# Patient Record
Sex: FEMALE | Race: OTHER | Employment: UNEMPLOYED | URBAN - METROPOLITAN AREA
[De-identification: names, ages, dates, MRNs, and addresses within clinical notes are randomized per-mention and may not be internally consistent; named-entity substitution may affect disease eponyms.]

---

## 2017-02-21 ENCOUNTER — OFFICE VISIT (OUTPATIENT)
Dept: INTERNAL MEDICINE CLINIC | Facility: CLINIC | Age: 27
End: 2017-02-21

## 2017-02-21 VITALS
BODY MASS INDEX: 26 KG/M2 | HEART RATE: 84 BPM | RESPIRATION RATE: 20 BRPM | SYSTOLIC BLOOD PRESSURE: 119 MMHG | WEIGHT: 145 LBS | TEMPERATURE: 98 F | DIASTOLIC BLOOD PRESSURE: 75 MMHG

## 2017-02-21 DIAGNOSIS — J06.9 VIRAL UPPER RESPIRATORY TRACT INFECTION: Primary | ICD-10-CM

## 2017-02-21 PROCEDURE — 99214 OFFICE O/P EST MOD 30 MIN: CPT | Performed by: INTERNAL MEDICINE

## 2017-02-21 PROCEDURE — 99212 OFFICE O/P EST SF 10 MIN: CPT | Performed by: INTERNAL MEDICINE

## 2017-02-21 RX ORDER — BENZONATATE 200 MG/1
200 CAPSULE ORAL 3 TIMES DAILY PRN
Qty: 12 CAPSULE | Refills: 0 | Status: SHIPPED | OUTPATIENT
Start: 2017-02-21 | End: 2017-03-23 | Stop reason: ALTCHOICE

## 2017-02-21 NOTE — PROGRESS NOTES
HPI:    Patient ID: Shari iHll is a 32year old female. Cough  This is a new problem. The cough is non-productive. Associated symptoms include myalgias and a sore throat.  Pertinent negatives include no chest pain, chills, fever or shortness of has no wheezes. She has no rales. Musculoskeletal: Normal range of motion. Neurological: She is alert and oriented to person, place, and time. Skin: She is not diaphoretic.    Psychiatric: Her behavior is normal.        02/21/17  1503   BP: 119/75   P

## 2017-03-23 ENCOUNTER — OFFICE VISIT (OUTPATIENT)
Dept: INTERNAL MEDICINE CLINIC | Facility: CLINIC | Age: 27
End: 2017-03-23

## 2017-03-23 VITALS
BODY MASS INDEX: 25.62 KG/M2 | HEART RATE: 92 BPM | DIASTOLIC BLOOD PRESSURE: 65 MMHG | SYSTOLIC BLOOD PRESSURE: 106 MMHG | TEMPERATURE: 98 F | HEIGHT: 63 IN | WEIGHT: 144.63 LBS

## 2017-03-23 DIAGNOSIS — H92.03 EAR PAIN, BILATERAL: Primary | ICD-10-CM

## 2017-03-23 PROCEDURE — 99212 OFFICE O/P EST SF 10 MIN: CPT | Performed by: INTERNAL MEDICINE

## 2017-03-23 PROCEDURE — 99213 OFFICE O/P EST LOW 20 MIN: CPT | Performed by: INTERNAL MEDICINE

## 2017-03-23 NOTE — PROGRESS NOTES
Fady Dalton is a 32year old female. Patient presents with:  Ear Pain    HPI:   Ms. Cathy Morrison presents this morning, accompanied by her  who helps with interpretation, for evaluation.     For the past 6 weeks, she has had persistent ear p plan.    Ignacio Gómez MD  3/23/2017  9:49 AM

## 2017-05-08 ENCOUNTER — TELEPHONE (OUTPATIENT)
Dept: OBGYN CLINIC | Facility: CLINIC | Age: 27
End: 2017-05-08

## 2017-05-08 NOTE — TELEPHONE ENCOUNTER
Palestinian speaking. C/O vaginal itching, vaginal burning, and cottage cheese like d/c for 2 weeks. States scratched the area a couple of days ago and noticed some spotting.   Thinks spotting may be related to being due for her period anytime as \"this is how

## 2017-05-08 NOTE — TELEPHONE ENCOUNTER
Per the pt she has a vaginal itch, and would like to speak with a nurse. Please advise. THE PT IS Serbian SPEAKING.

## 2017-05-12 NOTE — TELEPHONE ENCOUNTER
Unable to leave msg at # pt originally called from \"voice mail full\". Pt advised of below per CAP and states understanding. Pt offered appt for this morning declined d/t transportation. Pt accepted appt with CAP on 5/15/17.

## 2017-05-15 ENCOUNTER — OFFICE VISIT (OUTPATIENT)
Dept: OBGYN CLINIC | Facility: CLINIC | Age: 27
End: 2017-05-15

## 2017-05-15 VITALS
DIASTOLIC BLOOD PRESSURE: 72 MMHG | SYSTOLIC BLOOD PRESSURE: 112 MMHG | HEART RATE: 76 BPM | WEIGHT: 144.19 LBS | BODY MASS INDEX: 26 KG/M2

## 2017-05-15 DIAGNOSIS — A64 STD (SEXUALLY TRANSMITTED DISEASE): ICD-10-CM

## 2017-05-15 DIAGNOSIS — N89.8 DISCHARGE FROM THE VAGINA: Primary | ICD-10-CM

## 2017-05-15 PROCEDURE — 99213 OFFICE O/P EST LOW 20 MIN: CPT | Performed by: OBSTETRICS & GYNECOLOGY

## 2017-05-16 NOTE — PROGRESS NOTES
George Patterson 1990       Patient presents with:  Gyn Problem: itching, burning  since may 8th - burning and itching on the labia on the left. Pt also has some white mod consistency discharge.   Pt menses came on the  so she could not c

## 2017-05-23 ENCOUNTER — TELEPHONE (OUTPATIENT)
Dept: OBGYN CLINIC | Facility: CLINIC | Age: 27
End: 2017-05-23

## 2017-05-23 NOTE — TELEPHONE ENCOUNTER
----- Message from Ron Marr MD sent at 5/19/2017  9:24 AM CDT -----  Vaginal culture and Gc/chlamydia culture is negative

## 2017-05-26 NOTE — TELEPHONE ENCOUNTER
Spoke to  who states pt is not there right now. No LEO signed so was unable to given results to . Asked to have the pt call when she is able.  verbalized understanding.

## 2017-06-01 NOTE — TELEPHONE ENCOUNTER
Lmtcb at home. Called pt at cell # listed and informed of results per CAP below and states understanding.

## 2017-07-18 ENCOUNTER — TELEPHONE (OUTPATIENT)
Dept: OBGYN CLINIC | Facility: CLINIC | Age: 27
End: 2017-07-18

## 2017-07-18 NOTE — TELEPHONE ENCOUNTER
Spoke to patient's  LEO on file, informed patient's is due for a repeat pap smear 8/2017. Patient is to call Dr. Olman Correa office to set up appt. Patient  agrees with plan.

## 2019-04-01 ENCOUNTER — TELEPHONE (OUTPATIENT)
Dept: OBGYN CLINIC | Facility: CLINIC | Age: 29
End: 2019-04-01

## 2019-04-01 NOTE — TELEPHONE ENCOUNTER
Pt's  (LEO signed) reports lmp 12/12/18 and calling to start PN care. States pt was in [facility] [place] caring for her ill mother and did not know she was pregnant.  Pt is taking PNV prescribed in [facility] [place]  accepted OBN appt tomorrow morning and advised

## 2019-04-01 NOTE — TELEPHONE ENCOUNTER
Missed menses LMP 12/22/19 HPT (Pt in [facility] did get PN pills from Doc)   Carlo speaks [place]

## 2019-04-01 NOTE — TELEPHONE ENCOUNTER
Sent to Peds in error- sent to [facility] RN I have reviewed and confirmed nurses' notes for patient's medications, allergies, medical history, and surgical history.

## 2019-04-02 ENCOUNTER — NURSE ONLY (OUTPATIENT)
Dept: OBGYN CLINIC | Facility: CLINIC | Age: 29
End: 2019-04-02
Payer: MEDICAID

## 2019-04-02 ENCOUNTER — TELEPHONE (OUTPATIENT)
Dept: OBGYN CLINIC | Facility: CLINIC | Age: 29
End: 2019-04-02

## 2019-04-02 VITALS — BODY MASS INDEX: 26.75 KG/M2 | WEIGHT: 151 LBS | HEIGHT: 63 IN

## 2019-04-02 DIAGNOSIS — Z3A.16 16 WEEKS GESTATION OF PREGNANCY: Primary | ICD-10-CM

## 2019-04-02 DIAGNOSIS — Z34.82 ENCOUNTER FOR SUPERVISION OF OTHER NORMAL PREGNANCY IN SECOND TRIMESTER: Primary | ICD-10-CM

## 2019-04-02 PROCEDURE — 81025 URINE PREGNANCY TEST: CPT | Performed by: OBSTETRICS & GYNECOLOGY

## 2019-04-02 RX ORDER — PRENATAL VIT/IRON FUM/FOLIC AC 27MG-0.8MG
1 TABLET ORAL DAILY
COMMUNITY

## 2019-04-02 NOTE — TELEPHONE ENCOUNTER
Sebt to referral nurse. When enter for ob u/s, it stated prior auth for this test was needed. Let pt know when it is approved.

## 2019-04-02 NOTE — PROGRESS NOTES
Pt seen for OBN appt today with no complaints. Normal PN labs ordered, 1 hr gtt and varicella. Pt advised all labs must be completed and resulted prior to MD appt.   Pt walked to  to schedule NPN appt with MD.    Pt had a positive upt in the off Syndrome No    Hemophilia No    Kalida's Chorea No    Mental Retardation/Autism No    Muscular Dystrophy No    Neural tube defects No    Sickle Cell Disease or trait No    Hammad-Sachs Disease No    Thalassemia No    Other inherited genetic or chromosomal

## 2019-04-02 NOTE — TELEPHONE ENCOUNTER
Informed  to inform pt that pt needs to get blood type and an u/s. Informed  to have pt call after she has her blood type.  informed that the ob u/s needs prior auth for it.    given phone number to schedule ob u/s, once refe

## 2019-04-02 NOTE — TELEPHONE ENCOUNTER
Pt had her OBN appt today. She is 15w6d. Pt was in [facility] from Jan 13 thru Feb. 20.  Pt states while in [facility] she had spotting on [place] 15 and again Feb 13th. Denies having IC or a BM.        states that pt went to ob in [facility] and ob did an bedsid

## 2019-04-03 ENCOUNTER — LAB ENCOUNTER (OUTPATIENT)
Dept: LAB | Facility: HOSPITAL | Age: 29
End: 2019-04-03
Attending: OBSTETRICS & GYNECOLOGY
Payer: MEDICAID

## 2019-04-03 DIAGNOSIS — Z34.82 ENCOUNTER FOR SUPERVISION OF OTHER NORMAL PREGNANCY IN SECOND TRIMESTER: ICD-10-CM

## 2019-04-03 PROCEDURE — 82950 GLUCOSE TEST: CPT

## 2019-04-03 PROCEDURE — 36415 COLL VENOUS BLD VENIPUNCTURE: CPT

## 2019-04-03 PROCEDURE — 86787 VARICELLA-ZOSTER ANTIBODY: CPT

## 2019-04-03 PROCEDURE — 86901 BLOOD TYPING SEROLOGIC RH(D): CPT

## 2019-04-03 PROCEDURE — 87389 HIV-1 AG W/HIV-1&-2 AB AG IA: CPT

## 2019-04-03 PROCEDURE — 87340 HEPATITIS B SURFACE AG IA: CPT

## 2019-04-03 PROCEDURE — 85025 COMPLETE CBC W/AUTO DIFF WBC: CPT

## 2019-04-03 PROCEDURE — 86762 RUBELLA ANTIBODY: CPT

## 2019-04-03 PROCEDURE — 86850 RBC ANTIBODY SCREEN: CPT

## 2019-04-03 PROCEDURE — 87086 URINE CULTURE/COLONY COUNT: CPT

## 2019-04-03 PROCEDURE — 86780 TREPONEMA PALLIDUM: CPT

## 2019-04-03 PROCEDURE — 86900 BLOOD TYPING SEROLOGIC ABO: CPT

## 2019-04-05 ENCOUNTER — TELEPHONE (OUTPATIENT)
Dept: OBGYN CLINIC | Facility: CLINIC | Age: 29
End: 2019-04-05

## 2019-04-05 ENCOUNTER — LABORATORY ENCOUNTER (OUTPATIENT)
Dept: LAB | Facility: HOSPITAL | Age: 29
End: 2019-04-05
Attending: OBSTETRICS & GYNECOLOGY
Payer: MEDICAID

## 2019-04-05 ENCOUNTER — HOSPITAL ENCOUNTER (OUTPATIENT)
Dept: ULTRASOUND IMAGING | Facility: HOSPITAL | Age: 29
Discharge: HOME | End: 2019-04-05
Attending: OBSTETRICS & GYNECOLOGY
Payer: MEDICAID

## 2019-04-05 DIAGNOSIS — Z3A.16 16 WEEKS GESTATION OF PREGNANCY: ICD-10-CM

## 2019-04-05 DIAGNOSIS — Z34.82 ENCOUNTER FOR SUPERVISION OF OTHER NORMAL PREGNANCY IN SECOND TRIMESTER: ICD-10-CM

## 2019-04-05 DIAGNOSIS — O24.319 MODIFIED WHITE CLASS B PREGESTATIONAL DIABETES MELLITUS: Primary | ICD-10-CM

## 2019-04-05 DIAGNOSIS — O24.319 MODIFIED WHITE CLASS B PREGESTATIONAL DIABETES MELLITUS: ICD-10-CM

## 2019-04-05 DIAGNOSIS — R73.09 ELEVATED GLUCOSE TOLERANCE TEST: ICD-10-CM

## 2019-04-05 PROCEDURE — 82952 GTT-ADDED SAMPLES: CPT

## 2019-04-05 PROCEDURE — 36415 COLL VENOUS BLD VENIPUNCTURE: CPT

## 2019-04-05 PROCEDURE — 76816 OB US FOLLOW-UP PER FETUS: CPT | Performed by: OBSTETRICS & GYNECOLOGY

## 2019-04-05 PROCEDURE — 83036 HEMOGLOBIN GLYCOSYLATED A1C: CPT

## 2019-04-05 PROCEDURE — 82951 GLUCOSE TOLERANCE TEST (GTT): CPT

## 2019-04-05 NOTE — TELEPHONE ENCOUNTER
Notified pt of results and recs below. Provided pt with # to DM ed and to Opthalmology to set up appts. Pt states she will do the blood work today and  the container for the 24 hr urine.

## 2019-04-05 NOTE — TELEPHONE ENCOUNTER
Please call patient and notify that she failed her 3h GTT. She is considered a Class B Diabetic, which means this isn't pregnancy related. She will need to see diabetic education.        Also will need baseline labs and to see Optho:     Baseline 24hr

## 2019-04-05 NOTE — TELEPHONE ENCOUNTER
Patient needs to see [facility] MFM for Class B Diabetes.   Needs level 2, fetal echo, growth, and NSTs

## 2019-04-08 ENCOUNTER — TELEPHONE (OUTPATIENT)
Dept: OBGYN CLINIC | Facility: CLINIC | Age: 29
End: 2019-04-08

## 2019-04-08 ENCOUNTER — INITIAL PRENATAL (OUTPATIENT)
Dept: OBGYN CLINIC | Facility: CLINIC | Age: 29
End: 2019-04-08
Payer: MEDICAID

## 2019-04-08 VITALS
BODY MASS INDEX: 27 KG/M2 | WEIGHT: 151 LBS | SYSTOLIC BLOOD PRESSURE: 104 MMHG | HEART RATE: 71 BPM | DIASTOLIC BLOOD PRESSURE: 69 MMHG

## 2019-04-08 DIAGNOSIS — Z34.91 ENCOUNTER FOR SUPERVISION OF NORMAL PREGNANCY IN FIRST TRIMESTER, UNSPECIFIED GRAVIDITY: Primary | ICD-10-CM

## 2019-04-08 DIAGNOSIS — O24.319 PREGESTATIONAL DIABETES MELLITUS, MODIFIED WHITE CLASS B: ICD-10-CM

## 2019-04-08 PROCEDURE — 81002 URINALYSIS NONAUTO W/O SCOPE: CPT | Performed by: OBSTETRICS & GYNECOLOGY

## 2019-04-08 PROCEDURE — 0500F INITIAL PRENATAL CARE VISIT: CPT | Performed by: OBSTETRICS & GYNECOLOGY

## 2019-04-08 NOTE — TELEPHONE ENCOUNTER
PT IS NOT ON INSULIN YET BECAUSE SHE IS NEWLY DIAGNOSED. SHE FAILED HER 3 HOUR GLUCOSE TOLERANCE TEST. SHE HAS TO SEE THE DIABETIC EDUCATOR AND START CHECKING HER BLOOD SUGARS 4/DAY. SHE WILL INITIALLY START WITH DIET AND WHEN HER DIET IS NOT ENOUGH WE WILL BEGIN INSULIN.

## 2019-04-08 NOTE — TELEPHONE ENCOUNTER
There is nothing stating that pt is currently taking medication to manage diabetes so all cases were sent for review. Will patient be prescribed insulin? Please confirm so that I can try to get approved tomorrow.      [facility]#[id number]  Case# [id number]  Case# [id number]

## 2019-04-09 ENCOUNTER — HOSPITAL ENCOUNTER (OUTPATIENT)
Dept: ENDOCRINOLOGY | Facility: HOSPITAL | Age: 29
Discharge: HOME | End: 2019-04-09
Attending: OBSTETRICS & GYNECOLOGY
Payer: MEDICAID

## 2019-04-09 DIAGNOSIS — O24.319 PREGESTATIONAL DIABETES MELLITUS, MODIFIED WHITE CLASS B: Primary | ICD-10-CM

## 2019-04-09 NOTE — TELEPHONE ENCOUNTER
Patient needs MFM consult, level 2, fetal echo and serial MFM growth for Class B pregestational diabetes. Her  Tyshawn Sanchez does all translation for her.

## 2019-04-09 NOTE — PROGRESS NOTES
Po Zarate  : 1990 was seen for Gestational Diabetes Counseling: Group    Date: 2019   Start time: 0900  End time: 56    Pt is Yi speaking; used  # [id number]    Pt is at 16 wks gestation; noted after class that per referr Follow recommended GDM meal plan. 2. Begin testing fasting glucose and 2 hours after meals. Call MD or [facility] with any readings above targets. 3. Bring glucose log to each MD office visit. 4. Encouraged activity if no restrictions.    5. Encouraged Jannet Wolfe

## 2019-04-09 NOTE — TELEPHONE ENCOUNTER
Pt was on insulin with last pregnancy. She is newly dx for this pregnancy. The aforementioned testing is standard of care for Class B DM.

## 2019-04-09 NOTE — PROGRESS NOTES
Carlo at visit. They decline genetics. Discussed MFM consultation and serial US. She is seeing educator tomorrow and they will fax sugars within 3-4 days. She did use insulin last pregnancy.

## 2019-04-09 NOTE — TELEPHONE ENCOUNTER
[facility]- Pt does have an authorization for cpt 65433 in the event that the level 2 is not approved. I will attempt to get the authorizations at 20 weeks.

## 2019-04-10 ENCOUNTER — LAB ENCOUNTER (OUTPATIENT)
Dept: LAB | Facility: HOSPITAL | Age: 29
End: 2019-04-10
Attending: OBSTETRICS & GYNECOLOGY
Payer: MEDICAID

## 2019-04-10 DIAGNOSIS — O24.319 MODIFIED WHITE CLASS B PREGESTATIONAL DIABETES MELLITUS: ICD-10-CM

## 2019-04-10 PROCEDURE — 82570 ASSAY OF URINE CREATININE: CPT

## 2019-04-10 PROCEDURE — 84156 ASSAY OF PROTEIN URINE: CPT

## 2019-04-16 ENCOUNTER — OFFICE VISIT (OUTPATIENT)
Dept: OPTOMETRY | Facility: CLINIC | Age: 29
End: 2019-04-16
Payer: MEDICAID

## 2019-04-16 DIAGNOSIS — O24.319 PREGESTATIONAL DIABETES MELLITUS, MODIFIED WHITE CLASS B: Primary | ICD-10-CM

## 2019-04-16 PROCEDURE — 92004 COMPRE OPH EXAM NEW PT 1/>: CPT | Performed by: OPTOMETRIST

## 2019-04-16 NOTE — PATIENT INSTRUCTIONS
Pregestational diabetes mellitus, modified White class B  I advised patient that there is no background diabetic retinopathy in either eye and that they should continue to keep their blood sugar under control and continue to see their physician as directed

## 2019-04-16 NOTE — PROGRESS NOTES
Marcela Stokes is a 29year old female. HPI:     HPI     Diabetic Eye Exam     Diabetes characteristics include controlled with diet. Duration of 3 months. Number of years diabetic: 3 months. Number of years on pills 0.   Number of years on insul Eyes, Respiratory, Psychiatric, Allergic/Imm, Heme/Lymph    Last edited by Milana Garcia, OD on 4/16/2019  2:41 PM. (History)          PHYSICAL EXAM:     Base Eye Exam     Visual Acuity (Snellen - Linear)       Right Left Both    Dist sc 20/25 20/25 20/20 immediately if they notice any changes or problems with their vision       No orders of the defined types were placed in this encounter.       Meds This Visit:  Requested Prescriptions      No prescriptions requested or ordered in this encounter        Foll

## 2019-04-16 NOTE — ASSESSMENT & PLAN NOTE
I advised patient that there is no background diabetic retinopathy in either eye and that they should continue to keep their blood sugar under control and continue to see their physician as directed.  I stressed the importance of yearly diabetic eye exams i

## 2019-04-17 ENCOUNTER — TELEPHONE (OUTPATIENT)
Dept: OBGYN CLINIC | Facility: CLINIC | Age: 29
End: 2019-04-17

## 2019-04-17 NOTE — TELEPHONE ENCOUNTER
Notes recorded by Ivelisse Hansen DO on 4/17/2019 at 8:43 AM CDT  Pap is LGSIL and she'll need Colpo. She also had Colpo with Dr Thony Torres during 2015 pregnancy. Her pap normalized in 2016 and now mild again. Tried calling pt and pt is Kazakh speaking.  Pt

## 2019-04-23 ENCOUNTER — TELEPHONE (OUTPATIENT)
Dept: OBGYN CLINIC | Facility: CLINIC | Age: 29
End: 2019-04-23

## 2019-04-23 ENCOUNTER — ROUTINE PRENATAL (OUTPATIENT)
Dept: OBGYN CLINIC | Facility: CLINIC | Age: 29
End: 2019-04-23
Payer: MEDICAID

## 2019-04-23 VITALS
DIASTOLIC BLOOD PRESSURE: 67 MMHG | BODY MASS INDEX: 27 KG/M2 | HEART RATE: 92 BPM | SYSTOLIC BLOOD PRESSURE: 101 MMHG | WEIGHT: 151 LBS

## 2019-04-23 DIAGNOSIS — O24.319 PREGESTATIONAL DIABETES MELLITUS, MODIFIED WHITE CLASS B: Primary | ICD-10-CM

## 2019-04-23 DIAGNOSIS — O12.10 PROTEINURIA AFFECTING PREGNANCY, ANTEPARTUM: Primary | ICD-10-CM

## 2019-04-23 DIAGNOSIS — Z34.92 ENCOUNTER FOR SUPERVISION OF NORMAL PREGNANCY IN SECOND TRIMESTER, UNSPECIFIED GRAVIDITY: Primary | ICD-10-CM

## 2019-04-23 PROCEDURE — 0502F SUBSEQUENT PRENATAL CARE: CPT | Performed by: OBSTETRICS & GYNECOLOGY

## 2019-04-23 PROCEDURE — 81002 URINALYSIS NONAUTO W/O SCOPE: CPT | Performed by: OBSTETRICS & GYNECOLOGY

## 2019-04-23 NOTE — TELEPHONE ENCOUNTER
Called pt to get info on which glucometer she has. Pt reports she has her father in law glucometer. Advised pt she should not share the machine. Informed pt nurse will call in a rx for a glucometer and supplies.  Pt's pharmacy updated and rx called in for g

## 2019-04-23 NOTE — TELEPHONE ENCOUNTER
Called pt and  answered (LEO signed) states pt is not available at this time. Notified  of results and recs.  states he knows Dr Dalia Garcia because his father sees this doctor.  has phone # and will schedule appt.

## 2019-04-23 NOTE — TELEPHONE ENCOUNTER
Per pt, insurance denied MFM scans. Can we investigate this? She is a class B diabetic and needs to see [facility] for level 2, serial growth scans, and NSTs. Currently 18w6d. Thanks!

## 2019-04-23 NOTE — TELEPHONE ENCOUNTER
Patient's order for level 2 US was entered. Patient was informed and given scheduling info.    Cpt [id number] approved 4/23/19-1/18/20 [id number]

## 2019-04-23 NOTE — PROGRESS NOTES
No maternal issues reported. BS not checked this week due to not having lancets. Patient didn't call our office to notify of us. Did see diabetic ed and has glucometer from previous pregnancy.   Will have RNs investigate and coordinate getting supplies t

## 2019-04-23 NOTE — TELEPHONE ENCOUNTER
----- Message from Kimberley Valentin DO sent at 4/23/2019 10:48 AM CDT -----  Needs to see nephrologist (Dr. Agnieszka Andrea or Kathy Garzon). Elevated protein and creatinine in urine.

## 2019-04-23 NOTE — TELEPHONE ENCOUNTER
Pt class B diabetic. Didn't receive lancets/glucometer from diabetic education. She was diabetic with previous pregnancy so has a machine. Please call to find out what they need so she can start checking her sugars.

## 2019-04-24 ENCOUNTER — TELEPHONE (OUTPATIENT)
Dept: OBGYN CLINIC | Facility: CLINIC | Age: 29
End: 2019-04-24

## 2019-04-24 NOTE — TELEPHONE ENCOUNTER
LEFT MESSAGE FOR CHINO THAT PT IS TO CALL [facility] TO MAKE HER APPT AND I WILL NOTIFY PT OF THIS. I CALLED AND SPOKE WITH PT'S  AND NOTIFIED HIM THAT LEVEL II WAS APPROVED AND TO CALL MFM, NUMBER [id number].

## 2019-04-26 ENCOUNTER — HOSPITAL ENCOUNTER (OUTPATIENT)
Dept: ENDOCRINOLOGY | Facility: HOSPITAL | Age: 29
Discharge: HOME | End: 2019-04-26
Attending: OBSTETRICS & GYNECOLOGY
Payer: MEDICAID

## 2019-04-26 VITALS — WEIGHT: 151.19 LBS | BODY MASS INDEX: 27 KG/M2

## 2019-04-26 DIAGNOSIS — O24.319 PREGESTATIONAL DIABETES MELLITUS, MODIFIED WHITE CLASS B: Primary | ICD-10-CM

## 2019-04-26 NOTE — PROGRESS NOTES
Amisha Yeung  : 1990 was seen for GDM Follow-Up Counseling: Individual    Alessandro Reid declined language line. Family member assisted with translation during visit.     Date: 2019  Referring Provider: Dr. Skye Carney  Start time: 8:00 am End time activity on BG values. Reviewed types of activity, duration, precautions. Monitoring:  Instructed to report readings to MD as directed. Call MD: if FBG is > 95 twice in 1 week.      If 2 hr PP is > 120 twice in 1 week at any one meal.  Call Diabetic Ed

## 2019-05-08 ENCOUNTER — HOSPITAL ENCOUNTER (OUTPATIENT)
Dept: PERINATAL CARE | Facility: HOSPITAL | Age: 29
Discharge: HOME | End: 2019-05-08
Attending: OBSTETRICS & GYNECOLOGY
Payer: MEDICAID

## 2019-05-08 VITALS
WEIGHT: 152 LBS | SYSTOLIC BLOOD PRESSURE: 105 MMHG | DIASTOLIC BLOOD PRESSURE: 60 MMHG | BODY MASS INDEX: 27 KG/M2 | HEART RATE: 81 BPM

## 2019-05-08 DIAGNOSIS — O24.319 PREGESTATIONAL DIABETES MELLITUS, MODIFIED WHITE CLASS B: ICD-10-CM

## 2019-05-08 DIAGNOSIS — O24.319 PREGESTATIONAL DIABETES MELLITUS, MODIFIED WHITE CLASS B: Primary | ICD-10-CM

## 2019-05-08 PROCEDURE — 76811 OB US DETAILED SNGL FETUS: CPT | Performed by: OBSTETRICS & GYNECOLOGY

## 2019-05-08 PROCEDURE — 99213 OFFICE O/P EST LOW 20 MIN: CPT | Performed by: OBSTETRICS & GYNECOLOGY

## 2019-05-08 NOTE — PROGRESS NOTES
Reason for Consult:   Dear Dr. Theresa Cheng,    Thank you for requesting ultrasound evaluation and maternal fetal medicine consultation on Steph Lao. As you are aware she is a 29year old female with a Diggs pregnancy at 20w0d.   A maternal- • Diabetes Maternal Uncle    • Glaucoma Neg       Social History    Tobacco Use      Smoking status: Never Smoker      Smokeless tobacco: Never Used    Alcohol use: No      Alcohol/week: 0.0 oz      Comment: None.      Drug use: No       NARRATIVE:     BP imaging tab for complete ultrasound report or in PACS    Fetal Heart Rate: Present 146 bpm  Fetal Presentation: Breech  Amniotic fluid MVP: WNL  Cord: 3 vessel cord  Placental Location: Posterior     EFW: 393g  (  14  oz )      Fetal Anatomy:  Visualized w patient.

## 2019-05-14 ENCOUNTER — TELEPHONE (OUTPATIENT)
Dept: OBGYN CLINIC | Facility: CLINIC | Age: 29
End: 2019-05-14

## 2019-05-14 DIAGNOSIS — O24.319 PREGESTATIONAL DIABETES MELLITUS, MODIFIED WHITE CLASS B: Primary | ICD-10-CM

## 2019-05-14 NOTE — TELEPHONE ENCOUNTER
Per Gennaro Scheuermann [facility] clinical reviewer if pt's    HEMOGLOBIN A1C is not 6.5 or above it does not meet criteria for approval.  No order placed. Case# [id number] cpt [id number]  Case# [id number] cpt 62242  Cpt 25465 does not require prior auth.

## 2019-05-15 ENCOUNTER — OFFICE VISIT (OUTPATIENT)
Dept: OBGYN CLINIC | Facility: CLINIC | Age: 29
End: 2019-05-15
Payer: MEDICAID

## 2019-05-15 VITALS
DIASTOLIC BLOOD PRESSURE: 71 MMHG | WEIGHT: 152.63 LBS | BODY MASS INDEX: 27 KG/M2 | HEART RATE: 76 BPM | SYSTOLIC BLOOD PRESSURE: 109 MMHG

## 2019-05-15 DIAGNOSIS — R87.612 PAPANICOLAOU SMEAR OF CERVIX WITH LOW GRADE SQUAMOUS INTRAEPITHELIAL LESION (LGSIL): Primary | ICD-10-CM

## 2019-05-15 PROCEDURE — 57452 EXAM OF CERVIX W/SCOPE: CPT | Performed by: OBSTETRICS & GYNECOLOGY

## 2019-05-15 NOTE — TELEPHONE ENCOUNTER
Pt's mailbox is full. Called pt to let her know that the fetal echo was not approved and that she needs to cancel the appointment with MFM.

## 2019-05-24 NOTE — PROGRESS NOTES
Colposcopy    Pregnancy Results: n/a  Birth control method(s) used: n/a    Consent signed. Procedure discussed with patient in detail including indication, risk, benefits, alternatives and complications.     Pre-Procedure:  FHT's 142    Cervix prepped with

## 2019-05-28 ENCOUNTER — ROUTINE PRENATAL (OUTPATIENT)
Dept: OBGYN CLINIC | Facility: CLINIC | Age: 29
End: 2019-05-28
Payer: MEDICAID

## 2019-05-28 ENCOUNTER — TELEPHONE (OUTPATIENT)
Dept: OBGYN CLINIC | Facility: CLINIC | Age: 29
End: 2019-05-28

## 2019-05-28 VITALS
SYSTOLIC BLOOD PRESSURE: 109 MMHG | HEART RATE: 83 BPM | BODY MASS INDEX: 28 KG/M2 | DIASTOLIC BLOOD PRESSURE: 67 MMHG | WEIGHT: 157.19 LBS

## 2019-05-28 DIAGNOSIS — O24.419 GESTATIONAL DIABETES MELLITUS (GDM) IN SECOND TRIMESTER, GESTATIONAL DIABETES METHOD OF CONTROL UNSPECIFIED: Primary | ICD-10-CM

## 2019-05-28 DIAGNOSIS — Z34.92 ENCOUNTER FOR SUPERVISION OF NORMAL PREGNANCY IN SECOND TRIMESTER, UNSPECIFIED GRAVIDITY: Primary | ICD-10-CM

## 2019-05-28 PROBLEM — Z78.9 HISTORY OF FOREIGN TRAVEL: Status: ACTIVE | Noted: 2019-05-28

## 2019-05-28 PROCEDURE — 0502F SUBSEQUENT PRENATAL CARE: CPT | Performed by: OBSTETRICS & GYNECOLOGY

## 2019-05-28 PROCEDURE — 81002 URINALYSIS NONAUTO W/O SCOPE: CPT | Performed by: OBSTETRICS & GYNECOLOGY

## 2019-05-28 NOTE — TELEPHONE ENCOUNTER
Notified pt of message below. Provided pt with # to DM ED to call to schedule appt for insulin teaching. Informed pt DM ED will order insulin for pt. Pt verbalized understanding.

## 2019-05-28 NOTE — TELEPHONE ENCOUNTER
Pt seen in office today. Pt needs to start 2 NPH at hs. Please send supplies to pharm.   Pt needs to see DM educator to be taught

## 2019-05-29 ENCOUNTER — APPOINTMENT (OUTPATIENT)
Dept: LAB | Facility: HOSPITAL | Age: 29
End: 2019-05-29
Attending: OBSTETRICS & GYNECOLOGY
Payer: MEDICAID

## 2019-05-29 DIAGNOSIS — Z34.92 ENCOUNTER FOR SUPERVISION OF NORMAL PREGNANCY IN SECOND TRIMESTER, UNSPECIFIED GRAVIDITY: ICD-10-CM

## 2019-05-29 PROCEDURE — 85027 COMPLETE CBC AUTOMATED: CPT

## 2019-05-29 PROCEDURE — 36415 COLL VENOUS BLD VENIPUNCTURE: CPT

## 2019-05-30 ENCOUNTER — HOSPITAL ENCOUNTER (OUTPATIENT)
Dept: ENDOCRINOLOGY | Facility: HOSPITAL | Age: 29
Discharge: HOME | End: 2019-05-30
Attending: OBSTETRICS & GYNECOLOGY
Payer: MEDICAID

## 2019-05-30 VITALS — WEIGHT: 156.5 LBS | BODY MASS INDEX: 28 KG/M2

## 2019-05-30 DIAGNOSIS — O24.419 GESTATIONAL DIABETES MELLITUS (GDM) IN SECOND TRIMESTER, GESTATIONAL DIABETES METHOD OF CONTROL UNSPECIFIED: ICD-10-CM

## 2019-05-30 NOTE — PROGRESS NOTES
Jessica Javier  : 1990 was seen for Insulin Injection Instruction, Individual Visit    Date: 2019    Start time: 9:00 A End time: 10:00 A  Assessment:  Wt 156 lb 8 oz   LMP 2018 (Exact Date)   BMI 27.72 kg/m²   Weight: Wt Readings fro

## 2019-05-31 ENCOUNTER — APPOINTMENT (OUTPATIENT)
Dept: ENDOCRINOLOGY | Facility: HOSPITAL | Age: 29
End: 2019-05-31
Attending: OBSTETRICS & GYNECOLOGY
Payer: MEDICAID

## 2019-06-12 ENCOUNTER — ROUTINE PRENATAL (OUTPATIENT)
Dept: OBGYN CLINIC | Facility: CLINIC | Age: 29
End: 2019-06-12
Payer: MEDICAID

## 2019-06-12 VITALS
HEART RATE: 88 BPM | WEIGHT: 155 LBS | DIASTOLIC BLOOD PRESSURE: 68 MMHG | BODY MASS INDEX: 27 KG/M2 | SYSTOLIC BLOOD PRESSURE: 103 MMHG

## 2019-06-12 DIAGNOSIS — Z34.82 ENCOUNTER FOR SUPERVISION OF OTHER NORMAL PREGNANCY IN SECOND TRIMESTER: Primary | ICD-10-CM

## 2019-06-12 PROCEDURE — 0502F SUBSEQUENT PRENATAL CARE: CPT | Performed by: OBSTETRICS & GYNECOLOGY

## 2019-06-12 PROCEDURE — 81002 URINALYSIS NONAUTO W/O SCOPE: CPT | Performed by: OBSTETRICS & GYNECOLOGY

## 2019-06-12 NOTE — PROGRESS NOTES
On 2 units NPH at night but last weeks sugars not well monitored. Needs to send on Saturday. Pt states she has been waiting for visit to bring in sugars. Needs to email them every 3-4 days. May need dinner insulin also. RTC 2 wks.  Has appt for nephrology o

## 2019-06-19 ENCOUNTER — TELEPHONE (OUTPATIENT)
Dept: OBGYN CLINIC | Facility: CLINIC | Age: 29
End: 2019-06-19

## 2019-06-19 NOTE — TELEPHONE ENCOUNTER
LENNYK reviewed pt's blood sugars. She now wants pt to take 0 + 2 + 2 + 6N. Called [facility]  and placed rx for Humalog H Short Acting Insulin with Corin Eugene the pharmacist. [phone].     (Pt has needles and syringes.)     Had Language Line call the pt for

## 2019-06-26 ENCOUNTER — TELEPHONE (OUTPATIENT)
Dept: OBGYN CLINIC | Facility: CLINIC | Age: 29
End: 2019-06-26

## 2019-06-26 ENCOUNTER — ROUTINE PRENATAL (OUTPATIENT)
Dept: OBGYN CLINIC | Facility: CLINIC | Age: 29
End: 2019-06-26
Payer: MEDICAID

## 2019-06-26 ENCOUNTER — HOSPITAL ENCOUNTER (OUTPATIENT)
Dept: PERINATAL CARE | Facility: HOSPITAL | Age: 29
Discharge: HOME | End: 2019-06-26
Attending: OBSTETRICS & GYNECOLOGY
Payer: MEDICAID

## 2019-06-26 VITALS
BODY MASS INDEX: 28 KG/M2 | WEIGHT: 156 LBS | SYSTOLIC BLOOD PRESSURE: 105 MMHG | DIASTOLIC BLOOD PRESSURE: 67 MMHG | HEART RATE: 79 BPM

## 2019-06-26 VITALS
BODY MASS INDEX: 28 KG/M2 | SYSTOLIC BLOOD PRESSURE: 101 MMHG | DIASTOLIC BLOOD PRESSURE: 64 MMHG | WEIGHT: 156.63 LBS | HEART RATE: 77 BPM

## 2019-06-26 DIAGNOSIS — O24.319 PREGESTATIONAL DIABETES MELLITUS, MODIFIED WHITE CLASS B: ICD-10-CM

## 2019-06-26 DIAGNOSIS — O12.13 PROTEINURIA AFFECTING PREGNANCY IN THIRD TRIMESTER: ICD-10-CM

## 2019-06-26 DIAGNOSIS — Z34.93 ENCOUNTER FOR SUPERVISION OF NORMAL PREGNANCY IN THIRD TRIMESTER, UNSPECIFIED GRAVIDITY: Primary | ICD-10-CM

## 2019-06-26 DIAGNOSIS — O24.319 PREGESTATIONAL DIABETES MELLITUS, MODIFIED WHITE CLASS B: Primary | ICD-10-CM

## 2019-06-26 DIAGNOSIS — Z78.9 HISTORY OF FOREIGN TRAVEL: ICD-10-CM

## 2019-06-26 PROCEDURE — 81002 URINALYSIS NONAUTO W/O SCOPE: CPT | Performed by: OBSTETRICS & GYNECOLOGY

## 2019-06-26 PROCEDURE — 99213 OFFICE O/P EST LOW 20 MIN: CPT | Performed by: OBSTETRICS & GYNECOLOGY

## 2019-06-26 PROCEDURE — 76816 OB US FOLLOW-UP PER FETUS: CPT | Performed by: OBSTETRICS & GYNECOLOGY

## 2019-06-26 PROCEDURE — 0502F SUBSEQUENT PRENATAL CARE: CPT | Performed by: OBSTETRICS & GYNECOLOGY

## 2019-06-26 RX ORDER — INSULIN LISPRO 100 [IU]/ML
2 INJECTION, SOLUTION INTRAVENOUS; SUBCUTANEOUS
Qty: 1 VIAL | Refills: 11 | Status: ON HOLD | OUTPATIENT
Start: 2019-06-26 | End: 2019-09-06

## 2019-06-26 NOTE — TELEPHONE ENCOUNTER
It does not appear that patient has seen nephrology. Please call and help arrange that or give the contact info. Pt speaks Malawian only.  speaks Makeda Kyle.

## 2019-06-26 NOTE — PROGRESS NOTES
Reason for Consult:   Dear Dr. Pa Baltazar,    Thank you for requesting ultrasound evaluation and maternal fetal medicine consultation on Arturo Anderson. As you are aware she is a 34year old female with a Diggs pregnancy.   A maternal-fetal med Diabetes Maternal Uncle    • Glaucoma Neg       Social History    Tobacco Use      Smoking status: Never Smoker      Smokeless tobacco: Never Used    Alcohol use: No      Alcohol/week: 0.0 oz      Comment: None.      Drug use: No       NARRATIVE:      Ultrasound findings: This is a Rafiq Muckle pregnancy    The fetal measurements are consistent with established EDC. No gross ultrasound evidence of structural abnormalities are seen today.   The patient understands that ultrasound cannot rule out all structu

## 2019-06-26 NOTE — TELEPHONE ENCOUNTER
Spoke with Carlo (LEO signed) and Miguel Ley states pt had appt with Dr. Meaghan Ragland 7/3/19. Carlo states he misinformed CAP of pt insulin regimen. Carlo states pt current insulin regimen in 0 + 2 + 2 + 6N.  Informed Carlo message will be sent to CAP as FYI of pt

## 2019-06-26 NOTE — TELEPHONE ENCOUNTER
Aretha Otero requesting prior authorization be expedited. Please contact vladislav at [phone].     CPT code: 09248

## 2019-06-27 NOTE — TELEPHONE ENCOUNTER
See below. [address] notified of pt was on (0 + 2 + 2 + 6N). KCB reveiwed BS log and increased insulin as follows ( 0 + 2 + 4 + 8N). Pt's  (LEO signed) notified and asked him if he can write it down for pt.   agreed and states he wrote new doses and

## 2019-07-02 ENCOUNTER — TELEPHONE (OUTPATIENT)
Dept: OBGYN CLINIC | Facility: CLINIC | Age: 29
End: 2019-07-02

## 2019-07-02 NOTE — TELEPHONE ENCOUNTER
calling with #'s    6/27- keytones 5, fasting 93, after breakfast 108, after lunch 100, after dinner 115    6/28- keytones 5, fasting 82, after breakfast 104, after lunch 114, after dinner 117    6/29 keytones 5, fasting 86, after breakfast 118, af

## 2019-07-02 NOTE — TELEPHONE ENCOUNTER
Lm to call and give us #s over the phone. BS log received via fax is 1/2 faded and unable to see #s. Log placed in triage room.

## 2019-07-02 NOTE — TELEPHONE ENCOUNTER
Routed to Neal Mcclellan on call since no physicians in clinic at this time. Confirmed BS log values below with pt's .  Also confirmed insulin doses (0 + 4 + 4 + 8NPH) per message below and  confirms doses and that pt is administering insulin before eati

## 2019-07-03 NOTE — TELEPHONE ENCOUNTER
Talked to pt's  and informed him that RADHA stated pt should continue 0 + 4 + 4 + 8N. Informed  and he will inform his wife. Informed  that RADHA wants her to email or call us on Saturday with your bs.   He stated understanding and will in

## 2019-07-05 ENCOUNTER — TELEPHONE (OUTPATIENT)
Dept: OBGYN CLINIC | Facility: CLINIC | Age: 29
End: 2019-07-05

## 2019-07-05 NOTE — TELEPHONE ENCOUNTER
RECEIVED FAXES FROM [facility] INDICATING THAT PT GOT INSULIN SYRINGES, TRUE METRIX TEST STRIPS, HUMALOG INSULIN  AND TRUPLUS LANCETS BUT IS COVERED FOR A SHORT TERM TRANSITION SUPPLY. THEY WILL NOT COVER THESE RX'S ANYMORE.   I CALLED THE PHARMACY AND AUTHORIZE

## 2019-07-06 ENCOUNTER — TELEPHONE (OUTPATIENT)
Dept: OBGYN CLINIC | Facility: CLINIC | Age: 29
End: 2019-07-06

## 2019-07-06 NOTE — TELEPHONE ENCOUNTER
SOSA reviewed blood sugars that pt had given me using the language line, , [id number]. Used the language line,  and informed her that [address] wants her to know take   0 + 4 + 4 + 10N. Pt stated understanding.   Pt will fax

## 2019-07-08 ENCOUNTER — OFFICE VISIT (OUTPATIENT)
Dept: NEPHROLOGY | Facility: CLINIC | Age: 29
End: 2019-07-08
Payer: MEDICAID

## 2019-07-08 VITALS
WEIGHT: 159 LBS | BODY MASS INDEX: 28.17 KG/M2 | DIASTOLIC BLOOD PRESSURE: 67 MMHG | HEIGHT: 63 IN | SYSTOLIC BLOOD PRESSURE: 100 MMHG | HEART RATE: 73 BPM

## 2019-07-08 DIAGNOSIS — O12.13 PROTEINURIA AFFECTING PREGNANCY IN THIRD TRIMESTER: Primary | ICD-10-CM

## 2019-07-08 DIAGNOSIS — O24.319 PREGESTATIONAL DIABETES MELLITUS, MODIFIED WHITE CLASS B: ICD-10-CM

## 2019-07-08 PROCEDURE — 99243 OFF/OP CNSLTJ NEW/EST LOW 30: CPT | Performed by: INTERNAL MEDICINE

## 2019-07-10 ENCOUNTER — ROUTINE PRENATAL (OUTPATIENT)
Dept: OBGYN CLINIC | Facility: CLINIC | Age: 29
End: 2019-07-10
Payer: MEDICAID

## 2019-07-10 VITALS
SYSTOLIC BLOOD PRESSURE: 104 MMHG | DIASTOLIC BLOOD PRESSURE: 67 MMHG | BODY MASS INDEX: 27 KG/M2 | WEIGHT: 155 LBS | HEART RATE: 76 BPM

## 2019-07-10 DIAGNOSIS — Z34.83 ENCOUNTER FOR SUPERVISION OF OTHER NORMAL PREGNANCY IN THIRD TRIMESTER: Primary | ICD-10-CM

## 2019-07-10 DIAGNOSIS — O24.319 PREGESTATIONAL DIABETES MELLITUS, MODIFIED WHITE CLASS B: ICD-10-CM

## 2019-07-10 LAB
APPEARANCE: CLEAR
MULTISTIX LOT#: NORMAL NUMERIC
URINE-COLOR: YELLOW

## 2019-07-10 PROCEDURE — 81002 URINALYSIS NONAUTO W/O SCOPE: CPT | Performed by: OBSTETRICS & GYNECOLOGY

## 2019-07-10 PROCEDURE — 0502F SUBSEQUENT PRENATAL CARE: CPT | Performed by: OBSTETRICS & GYNECOLOGY

## 2019-07-10 NOTE — PROGRESS NOTES
NO complaints. Saw nephro on the 7/8. Notes pending. Per pt, to do some blood and urine labs. BS log reviewed. Inc to 0+4+6+12N. Normal growth US. Has monthly scheduled. NST orders given. RTC 2wks. BS log q3-4d reviewed.

## 2019-07-16 NOTE — TELEPHONE ENCOUNTER
JLK reviewed BS log and increased insulin. Pt new insulin regimen   0 + 4 + 6 +14N. Spoke to pt using language line Cleopatra Wan ID #[id number]). Pt informed of increase and new insulin regimen. Pt aware to send BS log in 3-4 days. Pt verbalized understanding.

## 2019-07-16 NOTE — TELEPHONE ENCOUNTER
alma Matos called in pt sugars  12th - keytone 5, sugar 87                                     96                                      99                                     102  13th keytone 5, sugar    82                                      86

## 2019-07-17 ENCOUNTER — TELEPHONE (OUTPATIENT)
Dept: NEPHROLOGY | Facility: CLINIC | Age: 29
End: 2019-07-17

## 2019-07-17 NOTE — PROGRESS NOTES
Dear Phu Matthews,  Had the pleasure of seeing Kavya Akhtar and her  Jaqueline Lauren today   Daxa Hernandez is a 22-year-old  female originally from [facility] who already has 1 child.   Her second child in September and she is doing well with her pregnancy except that she ha

## 2019-07-22 ENCOUNTER — TELEPHONE (OUTPATIENT)
Dept: OBGYN CLINIC | Facility: CLINIC | Age: 29
End: 2019-07-22

## 2019-07-22 NOTE — TELEPHONE ENCOUNTER
Called pt using language line ID # [id number]. LENNYK reviewed pts BS log and increased pts insulin regimen to 0 + 4 + 8 + 14 N. Pt informed with language line of insulin change and advised to send next log in 3-4 days. Pt verbalized understanding.

## 2019-07-23 ENCOUNTER — APPOINTMENT (OUTPATIENT)
Dept: LAB | Facility: HOSPITAL | Age: 29
End: 2019-07-23
Attending: INTERNAL MEDICINE
Payer: MEDICAID

## 2019-07-23 DIAGNOSIS — O12.13 PROTEINURIA AFFECTING PREGNANCY IN THIRD TRIMESTER: ICD-10-CM

## 2019-07-23 LAB
ANION GAP SERPL CALC-SCNC: 8 MMOL/L (ref 0–18)
BILIRUB UR QL: NEGATIVE
BUN BLD-MCNC: 10 MG/DL (ref 7–18)
BUN/CREAT SERPL: 16.4 (ref 10–20)
CALCIUM BLD-MCNC: 8.5 MG/DL (ref 8.5–10.1)
CHLORIDE SERPL-SCNC: 110 MMOL/L (ref 98–112)
CO2 SERPL-SCNC: 22 MMOL/L (ref 21–32)
COLOR UR: YELLOW
CREAT BLD-MCNC: 0.61 MG/DL (ref 0.55–1.02)
CREAT UR-SCNC: 293 MG/DL
GLUCOSE BLD-MCNC: 84 MG/DL (ref 70–99)
GLUCOSE UR-MCNC: NEGATIVE MG/DL
HGB UR QL STRIP.AUTO: NEGATIVE
KETONES UR-MCNC: NEGATIVE MG/DL
MICROALBUMIN UR-MCNC: 1.68 MG/DL
MICROALBUMIN/CREAT 24H UR-RTO: 5.7 UG/MG (ref ?–30)
NITRITE UR QL STRIP.AUTO: NEGATIVE
OSMOLALITY SERPL CALC.SUM OF ELEC: 288 MOSM/KG (ref 275–295)
PATIENT FASTING: YES
PH UR: 6 [PH] (ref 5–8)
POTASSIUM SERPL-SCNC: 4 MMOL/L (ref 3.5–5.1)
PROT UR-MCNC: NEGATIVE MG/DL
RBC #/AREA URNS AUTO: 2 /HPF
SODIUM SERPL-SCNC: 140 MMOL/L (ref 136–145)
SP GR UR STRIP: 1.02 (ref 1–1.03)
UROBILINOGEN UR STRIP-ACNC: <2
VIT C UR-MCNC: NEGATIVE MG/DL
WBC #/AREA URNS AUTO: 9 /HPF

## 2019-07-23 PROCEDURE — 82570 ASSAY OF URINE CREATININE: CPT

## 2019-07-23 PROCEDURE — 81001 URINALYSIS AUTO W/SCOPE: CPT

## 2019-07-23 PROCEDURE — 82043 UR ALBUMIN QUANTITATIVE: CPT

## 2019-07-23 PROCEDURE — 36415 COLL VENOUS BLD VENIPUNCTURE: CPT

## 2019-07-23 PROCEDURE — 80048 BASIC METABOLIC PNL TOTAL CA: CPT

## 2019-07-24 ENCOUNTER — HOSPITAL ENCOUNTER (OUTPATIENT)
Dept: PERINATAL CARE | Facility: HOSPITAL | Age: 29
Discharge: HOME | End: 2019-07-24
Attending: OBSTETRICS & GYNECOLOGY
Payer: MEDICAID

## 2019-07-24 ENCOUNTER — ROUTINE PRENATAL (OUTPATIENT)
Dept: OBGYN CLINIC | Facility: CLINIC | Age: 29
End: 2019-07-24
Payer: MEDICAID

## 2019-07-24 VITALS
WEIGHT: 159 LBS | BODY MASS INDEX: 28 KG/M2 | HEART RATE: 62 BPM | DIASTOLIC BLOOD PRESSURE: 69 MMHG | SYSTOLIC BLOOD PRESSURE: 107 MMHG

## 2019-07-24 VITALS
SYSTOLIC BLOOD PRESSURE: 120 MMHG | HEART RATE: 73 BPM | BODY MASS INDEX: 28 KG/M2 | WEIGHT: 160 LBS | DIASTOLIC BLOOD PRESSURE: 78 MMHG

## 2019-07-24 VITALS
BODY MASS INDEX: 28 KG/M2 | DIASTOLIC BLOOD PRESSURE: 78 MMHG | WEIGHT: 160 LBS | SYSTOLIC BLOOD PRESSURE: 120 MMHG | HEART RATE: 73 BPM

## 2019-07-24 DIAGNOSIS — O24.319 PREGESTATIONAL DIABETES MELLITUS, MODIFIED WHITE CLASS B: ICD-10-CM

## 2019-07-24 DIAGNOSIS — Z78.9 HISTORY OF FOREIGN TRAVEL: ICD-10-CM

## 2019-07-24 DIAGNOSIS — O24.319 PREGESTATIONAL DIABETES MELLITUS, MODIFIED WHITE CLASS B: Primary | ICD-10-CM

## 2019-07-24 DIAGNOSIS — Z34.83 ENCOUNTER FOR SUPERVISION OF OTHER NORMAL PREGNANCY IN THIRD TRIMESTER: Primary | ICD-10-CM

## 2019-07-24 DIAGNOSIS — O12.13 PROTEINURIA AFFECTING PREGNANCY IN THIRD TRIMESTER: ICD-10-CM

## 2019-07-24 LAB
APPEARANCE: CLEAR
MULTISTIX LOT#: NORMAL NUMERIC
URINE-COLOR: YELLOW

## 2019-07-24 PROCEDURE — 90471 IMMUNIZATION ADMIN: CPT | Performed by: OBSTETRICS & GYNECOLOGY

## 2019-07-24 PROCEDURE — 76816 OB US FOLLOW-UP PER FETUS: CPT | Performed by: OBSTETRICS & GYNECOLOGY

## 2019-07-24 PROCEDURE — 99213 OFFICE O/P EST LOW 20 MIN: CPT | Performed by: OBSTETRICS & GYNECOLOGY

## 2019-07-24 PROCEDURE — 90715 TDAP VACCINE 7 YRS/> IM: CPT | Performed by: OBSTETRICS & GYNECOLOGY

## 2019-07-24 PROCEDURE — 0502F SUBSEQUENT PRENATAL CARE: CPT | Performed by: OBSTETRICS & GYNECOLOGY

## 2019-07-24 PROCEDURE — 81002 URINALYSIS NONAUTO W/O SCOPE: CPT | Performed by: OBSTETRICS & GYNECOLOGY

## 2019-07-24 PROCEDURE — 59025 FETAL NON-STRESS TEST: CPT | Performed by: OBSTETRICS & GYNECOLOGY

## 2019-07-24 NOTE — PROGRESS NOTES
Here with . Insulin 0+4+8+14N. Tdap.  occ B-H ctx. Increase fluid intake. Had NST and u/s today.    RTC 2 wk

## 2019-07-24 NOTE — PROGRESS NOTES
Reason for Consult:   Dear Dr. Sherrie Carney,    Thank you for requesting ultrasound evaluation and maternal fetal medicine consultation on Fady Dalton. As you are aware she is a 34year old female with a Diggs pregnancy.   A maternal-fetal med Use as directed. Disp: 1 Box Rfl: 5   PRENATAL 27-0.8 MG Oral Tab Take 1 tablet by mouth daily.  Disp:  Rfl:         HISTORY:  Past Medical History:   Diagnosis Date   • Gestational diabetes    • Migraine headache    • Varicose veins of both lower extremiti to decrease these risks to a level close to non diabetic patients.          OB ULTRASOUND REPORT   See imaging tab for complete ultrasound report or in PACS    Fetal Heart Rate: Present 134 bpm  Fetal Presentation: Vertex  Amniotic fluid MVP: 11.7 cm  Cord:

## 2019-07-24 NOTE — NST
Nonstress Test   Patient: Cyril Diaz    Gestation: 32w0d    NST: pregest DM         Variability: Moderate           Accelerations: Yes           Decelerations: None            Baseline: 130 BPM           Uterine Irritability: No   Rare ctx on trac

## 2019-07-29 ENCOUNTER — TELEPHONE (OUTPATIENT)
Dept: OBGYN CLINIC | Facility: CLINIC | Age: 29
End: 2019-07-29

## 2019-07-29 NOTE — TELEPHONE ENCOUNTER
Received pts BS log via fax. Pt has her dinner and bedtime insulin crossed off and unsure what that means? Called pt to clarify using language line (ID# [id number]) and  DESIREE. BS log placed back in triage until pt calls back.

## 2019-07-31 ENCOUNTER — HOSPITAL ENCOUNTER (OUTPATIENT)
Dept: PERINATAL CARE | Facility: HOSPITAL | Age: 29
Discharge: HOME | End: 2019-07-31
Attending: OBSTETRICS & GYNECOLOGY
Payer: MEDICAID

## 2019-07-31 ENCOUNTER — TELEPHONE (OUTPATIENT)
Dept: OBGYN CLINIC | Facility: CLINIC | Age: 29
End: 2019-07-31

## 2019-07-31 DIAGNOSIS — O24.319 PREGESTATIONAL DIABETES MELLITUS, MODIFIED WHITE CLASS B: Primary | ICD-10-CM

## 2019-07-31 PROCEDURE — 59025 FETAL NON-STRESS TEST: CPT | Performed by: OBSTETRICS & GYNECOLOGY

## 2019-07-31 NOTE — NST
Nonstress Test   Patient: Reji Howard    Gestation: 33w0d    NST: pregest dm       Variability: Moderate           Accelerations: Yes           Decelerations: None            Baseline: 130 BPM           Uterine Irritability: No           Contractio

## 2019-07-31 NOTE — TELEPHONE ENCOUNTER
Called pt using language line (ID: [id number]). Pt stated that on 7/27 when she had her dinner and bedtime insulin crossed off she did not take her insulin that day at those times.  Pt stated that she also went out to eat on 7/27 and 7/28 and she always \"eats

## 2019-07-31 NOTE — NST
NST note     I have reviewed the NST and agree with the above findings and recommendations.      Diagnosis:  Pregestational DM    Plan: weekly NST    Chris Fitzgerald MD    7/31/2019    11:06 AM

## 2019-07-31 NOTE — ADDENDUM NOTE
Encounter addended by: Letty Sotomayor MD on: 7/31/2019 11:06 AM   Actions taken: Sign clinical note, Charge Capture section accepted

## 2019-07-31 NOTE — TELEPHONE ENCOUNTER
----- Message from Seferino Haines sent at 7/26/2019  2:52 PM CDT -----  Regarding: MFM REFERRAL  Hi    Rescheduled appt from 8/19/19 to 8/21/19.     Have a good w/e

## 2019-08-06 ENCOUNTER — TELEPHONE (OUTPATIENT)
Dept: OBGYN CLINIC | Facility: CLINIC | Age: 29
End: 2019-08-06

## 2019-08-06 ENCOUNTER — ROUTINE PRENATAL (OUTPATIENT)
Dept: OBGYN CLINIC | Facility: CLINIC | Age: 29
End: 2019-08-06
Payer: MEDICAID

## 2019-08-06 VITALS
WEIGHT: 164 LBS | HEART RATE: 81 BPM | BODY MASS INDEX: 29 KG/M2 | SYSTOLIC BLOOD PRESSURE: 112 MMHG | DIASTOLIC BLOOD PRESSURE: 74 MMHG

## 2019-08-06 DIAGNOSIS — Z34.93 ENCOUNTER FOR SUPERVISION OF NORMAL PREGNANCY IN THIRD TRIMESTER, UNSPECIFIED GRAVIDITY: Primary | ICD-10-CM

## 2019-08-06 LAB
APPEARANCE: CLEAR
MULTISTIX LOT#: NORMAL NUMERIC
PH, URINE: 7 (ref 4.5–8)
SPECIFIC GRAVITY: 1.02 (ref 1–1.03)
URINE-COLOR: YELLOW

## 2019-08-06 PROCEDURE — 0502F SUBSEQUENT PRENATAL CARE: CPT | Performed by: OBSTETRICS & GYNECOLOGY

## 2019-08-06 PROCEDURE — 81002 URINALYSIS NONAUTO W/O SCOPE: CPT | Performed by: OBSTETRICS & GYNECOLOGY

## 2019-08-06 NOTE — TELEPHONE ENCOUNTER
, [id number], called pt at first number and lm for her to call us back.  called pt's second number informed pt of the change,  0 + 4 + 12 +14N.

## 2019-08-07 ENCOUNTER — HOSPITAL ENCOUNTER (OUTPATIENT)
Dept: PERINATAL CARE | Facility: HOSPITAL | Age: 29
Discharge: HOME | End: 2019-08-07
Attending: OBSTETRICS & GYNECOLOGY
Payer: MEDICAID

## 2019-08-07 DIAGNOSIS — O24.319 PREGESTATIONAL DIABETES MELLITUS, MODIFIED WHITE CLASS B: Primary | ICD-10-CM

## 2019-08-07 PROCEDURE — 59025 FETAL NON-STRESS TEST: CPT | Performed by: OBSTETRICS & GYNECOLOGY

## 2019-08-07 NOTE — NST
Nonstress Test   Patient: Lee Barnett    Gestation: 34w0d    NST: type 2 dm       Variability: Moderate           Accelerations: Yes           Decelerations: None            Baseline: 130 BPM           Uterine Irritability: No           Contraction

## 2019-08-10 ENCOUNTER — TELEPHONE (OUTPATIENT)
Dept: OBGYN CLINIC | Facility: CLINIC | Age: 29
End: 2019-08-10

## 2019-08-10 NOTE — TELEPHONE ENCOUNTER
Pt faxed in blood sugars from 8/6-8/10. On fax, pt has crossed off some insulin doses. Need to find out why.

## 2019-08-10 NOTE — TELEPHONE ENCOUNTER
RADHA reviewed BS log and did not make any changes to insulin ( 0+ 4 + 12 + 14NPH). Pt notified and instructed to send next log in 3-4 days.

## 2019-08-14 ENCOUNTER — HOSPITAL ENCOUNTER (OUTPATIENT)
Dept: PERINATAL CARE | Facility: HOSPITAL | Age: 29
Discharge: HOME | End: 2019-08-14
Attending: OBSTETRICS & GYNECOLOGY
Payer: MEDICAID

## 2019-08-14 DIAGNOSIS — O24.319 PREGESTATIONAL DIABETES MELLITUS, MODIFIED WHITE CLASS B: Primary | ICD-10-CM

## 2019-08-14 PROCEDURE — 59025 FETAL NON-STRESS TEST: CPT | Performed by: OBSTETRICS & GYNECOLOGY

## 2019-08-14 NOTE — NST
Nonstress Test   Patient: Drelistine Austin    Gestation: 35w0d    NST: type 2 dm        Variability: Moderate           Accelerations: Yes           Decelerations: None            Baseline: 135 BPM           Uterine Irritability: No           Contractio

## 2019-08-15 ENCOUNTER — TELEPHONE (OUTPATIENT)
Dept: OBGYN CLINIC | Facility: CLINIC | Age: 29
End: 2019-08-15

## 2019-08-15 NOTE — ADDENDUM NOTE
Encounter addended by: Francisco Avendano MD on: 8/15/2019 2:37 PM   Actions taken: Sign clinical note, Charge Capture section accepted

## 2019-08-19 ENCOUNTER — TELEPHONE (OUTPATIENT)
Dept: OBGYN CLINIC | Facility: CLINIC | Age: 29
End: 2019-08-19

## 2019-08-19 ENCOUNTER — HOSPITAL ENCOUNTER (OUTPATIENT)
Dept: PERINATAL CARE | Facility: HOSPITAL | Age: 29
Discharge: HOME | End: 2019-08-19
Attending: OBSTETRICS & GYNECOLOGY
Payer: MEDICAID

## 2019-08-19 ENCOUNTER — APPOINTMENT (OUTPATIENT)
Dept: PERINATAL CARE | Facility: HOSPITAL | Age: 29
End: 2019-08-19
Attending: OBSTETRICS & GYNECOLOGY
Payer: MEDICAID

## 2019-08-19 VITALS — HEART RATE: 79 BPM | DIASTOLIC BLOOD PRESSURE: 72 MMHG | SYSTOLIC BLOOD PRESSURE: 127 MMHG

## 2019-08-19 DIAGNOSIS — O24.414 INSULIN CONTROLLED WHITE CLASSIFICATION A2 GESTATIONAL DIABETES MELLITUS (GDM): Primary | ICD-10-CM

## 2019-08-19 PROCEDURE — 59025 FETAL NON-STRESS TEST: CPT | Performed by: OBSTETRICS & GYNECOLOGY

## 2019-08-19 NOTE — TELEPHONE ENCOUNTER
Nabeel with pt using  [id number]. Pt states she crossed off the dose after breakfast on 8/18/19 because she did not take that does. She did take all of her insulin on the other days. Sugars to [facility] for review.

## 2019-08-19 NOTE — NST
Nonstress Test   Patient: Nito Khan    Gestation: 35w5d    NST:       Variability: Moderate           Accelerations: Yes           Decelerations: None            Baseline: 130 BPM           Uterine Irritability: No           Contractions: Irregul

## 2019-08-19 NOTE — TELEPHONE ENCOUNTER
Per [facility], change sugars to 0+4+14+14N. Pt informed via  PD#[id number], [facility].  Pt advised to send sugars in 3-4 days. Pt expressed understanding.

## 2019-08-21 ENCOUNTER — LAB ENCOUNTER (OUTPATIENT)
Dept: LAB | Facility: HOSPITAL | Age: 29
End: 2019-08-21
Attending: OBSTETRICS & GYNECOLOGY
Payer: MEDICAID

## 2019-08-21 ENCOUNTER — HOSPITAL ENCOUNTER (OUTPATIENT)
Dept: PERINATAL CARE | Facility: HOSPITAL | Age: 29
Discharge: HOME | End: 2019-08-21
Attending: OBSTETRICS & GYNECOLOGY
Payer: MEDICAID

## 2019-08-21 ENCOUNTER — ROUTINE PRENATAL (OUTPATIENT)
Dept: OBGYN CLINIC | Facility: CLINIC | Age: 29
End: 2019-08-21
Payer: MEDICAID

## 2019-08-21 VITALS
HEART RATE: 68 BPM | WEIGHT: 164 LBS | DIASTOLIC BLOOD PRESSURE: 76 MMHG | BODY MASS INDEX: 29 KG/M2 | SYSTOLIC BLOOD PRESSURE: 126 MMHG

## 2019-08-21 VITALS
WEIGHT: 168.63 LBS | HEART RATE: 78 BPM | SYSTOLIC BLOOD PRESSURE: 115 MMHG | DIASTOLIC BLOOD PRESSURE: 79 MMHG | BODY MASS INDEX: 30 KG/M2

## 2019-08-21 DIAGNOSIS — O24.319 PREGESTATIONAL DIABETES MELLITUS, MODIFIED WHITE CLASS B: Primary | ICD-10-CM

## 2019-08-21 DIAGNOSIS — Z34.93 ENCOUNTER FOR SUPERVISION OF NORMAL PREGNANCY IN THIRD TRIMESTER, UNSPECIFIED GRAVIDITY: ICD-10-CM

## 2019-08-21 DIAGNOSIS — Z34.93 ENCOUNTER FOR SUPERVISION OF NORMAL PREGNANCY IN THIRD TRIMESTER, UNSPECIFIED GRAVIDITY: Primary | ICD-10-CM

## 2019-08-21 DIAGNOSIS — O12.13 PROTEINURIA AFFECTING PREGNANCY IN THIRD TRIMESTER: ICD-10-CM

## 2019-08-21 DIAGNOSIS — O24.319 PREGESTATIONAL DIABETES MELLITUS, MODIFIED WHITE CLASS B: ICD-10-CM

## 2019-08-21 LAB
DEPRECATED RDW RBC AUTO: 43.6 FL (ref 35.1–46.3)
ERYTHROCYTE [DISTWIDTH] IN BLOOD BY AUTOMATED COUNT: 13.2 % (ref 11–15)
HCT VFR BLD AUTO: 41.4 % (ref 35–48)
HGB BLD-MCNC: 14 G/DL (ref 12–16)
MCH RBC QN AUTO: 31 PG (ref 26–34)
MCHC RBC AUTO-ENTMCNC: 33.8 G/DL (ref 31–37)
MCV RBC AUTO: 91.8 FL (ref 80–100)
MULTISTIX LOT#: NORMAL NUMERIC
PH, URINE: 6.5 (ref 4.5–8)
PLATELET # BLD AUTO: 209 10(3)UL (ref 150–450)
RBC # BLD AUTO: 4.51 X10(6)UL (ref 3.8–5.3)
SPECIFIC GRAVITY: 1.01 (ref 1–1.03)
T PALLIDUM AB SER QL: NEGATIVE
UROBILINOGEN,SEMI-QN: 0 MG/DL (ref 0–1.9)
WBC # BLD AUTO: 6.4 X10(3) UL (ref 4–11)

## 2019-08-21 PROCEDURE — 76816 OB US FOLLOW-UP PER FETUS: CPT | Performed by: OBSTETRICS & GYNECOLOGY

## 2019-08-21 PROCEDURE — 87389 HIV-1 AG W/HIV-1&-2 AB AG IA: CPT

## 2019-08-21 PROCEDURE — 99213 OFFICE O/P EST LOW 20 MIN: CPT | Performed by: OBSTETRICS & GYNECOLOGY

## 2019-08-21 PROCEDURE — 76819 FETAL BIOPHYS PROFIL W/O NST: CPT | Performed by: OBSTETRICS & GYNECOLOGY

## 2019-08-21 PROCEDURE — 86780 TREPONEMA PALLIDUM: CPT

## 2019-08-21 PROCEDURE — 0502F SUBSEQUENT PRENATAL CARE: CPT | Performed by: OBSTETRICS & GYNECOLOGY

## 2019-08-21 PROCEDURE — 81002 URINALYSIS NONAUTO W/O SCOPE: CPT | Performed by: OBSTETRICS & GYNECOLOGY

## 2019-08-21 PROCEDURE — 36415 COLL VENOUS BLD VENIPUNCTURE: CPT

## 2019-08-21 PROCEDURE — 85027 COMPLETE CBC AUTOMATED: CPT

## 2019-08-21 NOTE — ADDENDUM NOTE
Encounter addended by:  Beatrice Godfrey DO on: 8/21/2019 11:24 AM   Actions taken: Sign clinical note

## 2019-08-21 NOTE — ADDENDUM NOTE
Encounter addended by: Alexandra Morocho on: 8/21/2019 3:29 PM   Actions taken: Charge Capture section accepted

## 2019-08-21 NOTE — PROGRESS NOTES
Reason for Consult:   Dear Dr. Dominique Aguilar,    Thank you for requesting ultrasound evaluation and maternal fetal medicine consultation on Giselle Miranda. As you are aware she is a 34year old female with a Diggs pregnancy.   A maternal-fetal med strip Rfl: 1   KERRI MICROLET LANCETS Does not apply Misc 1 lancet by Finger stick route 4 (four) times daily. Use as directed. Disp: 1 Box Rfl: 5   PRENATAL 27-0.8 MG Oral Tab Take 1 tablet by mouth daily.  Disp:  Rfl:         HISTORY:  Past Medical Histor avoidance of prolonged hypo- and hyperglycemia was addressed. In general good control of blood sugars can help to decrease these risks to a level close to non diabetic patients.          OB ULTRASOUND REPORT   See imaging tab for complete ultrasound r

## 2019-08-22 ENCOUNTER — TELEPHONE (OUTPATIENT)
Dept: OBGYN CLINIC | Facility: CLINIC | Age: 29
End: 2019-08-22

## 2019-08-22 ENCOUNTER — HOSPITAL ENCOUNTER (OUTPATIENT)
Dept: PERINATAL CARE | Facility: HOSPITAL | Age: 29
Discharge: HOME | End: 2019-08-22
Attending: OBSTETRICS & GYNECOLOGY
Payer: MEDICAID

## 2019-08-22 VITALS — SYSTOLIC BLOOD PRESSURE: 129 MMHG | HEART RATE: 66 BPM | DIASTOLIC BLOOD PRESSURE: 84 MMHG

## 2019-08-22 DIAGNOSIS — O24.319 PREGESTATIONAL DIABETES MELLITUS, MODIFIED WHITE CLASS B: ICD-10-CM

## 2019-08-22 PROCEDURE — 59025 FETAL NON-STRESS TEST: CPT | Performed by: OBSTETRICS & GYNECOLOGY

## 2019-08-22 NOTE — NST
Nonstress Test   Patient: Queen Fabian    Gestation: 36w1d    NST:       Variability: Moderate           Accelerations: Yes           Decelerations: None            Baseline: 125 BPM           Uterine Irritability: No           Contractions: Not pre

## 2019-08-22 NOTE — TELEPHONE ENCOUNTER
Pt dropped off her blood sugars. Sugars placed on JLK's desk for review. No change per JLK. Called pt with LL , Delores Hampton  [id number]. Pt informed of no change and instructed to send in her sugars in 3=4 days.

## 2019-08-26 ENCOUNTER — HOSPITAL ENCOUNTER (OUTPATIENT)
Dept: PERINATAL CARE | Facility: HOSPITAL | Age: 29
Discharge: HOME | End: 2019-08-26
Attending: OBSTETRICS & GYNECOLOGY
Payer: MEDICAID

## 2019-08-26 ENCOUNTER — TELEPHONE (OUTPATIENT)
Dept: OBGYN CLINIC | Facility: CLINIC | Age: 29
End: 2019-08-26

## 2019-08-26 DIAGNOSIS — O24.319 PREGESTATIONAL DIABETES MELLITUS, MODIFIED WHITE CLASS B: Primary | ICD-10-CM

## 2019-08-26 PROCEDURE — 59025 FETAL NON-STRESS TEST: CPT | Performed by: OBSTETRICS & GYNECOLOGY

## 2019-08-26 RX ORDER — NAPROXEN SODIUM 220 MG
TABLET ORAL
Qty: 100 EACH | Status: SHIPPED | OUTPATIENT
Start: 2019-08-26 | End: 2020-10-13

## 2019-08-26 NOTE — NST
Nonstress Test   Patient: Ange Medal    Gestation: 36w5d    NST: pre gest dm     Variability: Moderate           Accelerations: Yes           Decelerations: None            Baseline: 125 BPM           Uterine Irritability: No           Contraction

## 2019-08-26 NOTE — TELEPHONE ENCOUNTER
Lmtcb. BS log reviewed per RADHA \"no changes but hard to  w/o compliance on insulin dosing\" Log in back office.

## 2019-08-27 ENCOUNTER — TELEPHONE (OUTPATIENT)
Dept: OBGYN CLINIC | Facility: CLINIC | Age: 29
End: 2019-08-27

## 2019-08-27 ENCOUNTER — ROUTINE PRENATAL (OUTPATIENT)
Dept: OBGYN CLINIC | Facility: CLINIC | Age: 29
End: 2019-08-27
Payer: MEDICAID

## 2019-08-27 VITALS
DIASTOLIC BLOOD PRESSURE: 82 MMHG | HEART RATE: 81 BPM | SYSTOLIC BLOOD PRESSURE: 128 MMHG | WEIGHT: 167 LBS | BODY MASS INDEX: 30 KG/M2

## 2019-08-27 DIAGNOSIS — Z34.83 ENCOUNTER FOR SUPERVISION OF OTHER NORMAL PREGNANCY IN THIRD TRIMESTER: Primary | ICD-10-CM

## 2019-08-27 LAB
APPEARANCE: CLEAR
MULTISTIX LOT#: NORMAL NUMERIC
URINE-COLOR: YELLOW

## 2019-08-27 PROCEDURE — 81002 URINALYSIS NONAUTO W/O SCOPE: CPT | Performed by: OBSTETRICS & GYNECOLOGY

## 2019-08-27 PROCEDURE — 0502F SUBSEQUENT PRENATAL CARE: CPT | Performed by: OBSTETRICS & GYNECOLOGY

## 2019-08-27 NOTE — TELEPHONE ENCOUNTER
Romi Cook, please notify pt there are no available appts in the morning on 9/3. Please offer pt PN appt on 9/3 at 3:10 pm with CASSANDRA.

## 2019-08-27 NOTE — TELEPHONE ENCOUNTER
Miguel Angel hernandez'evin  Pt was seen today; was told to come in Tues/w-be induced at 38wks    No available bony't at [place] [address], she prefers here, has bony't with mfm on Tues 8:30Am    Please advise

## 2019-08-27 NOTE — TELEPHONE ENCOUNTER
Pt is here for PN appt with [facility]. BS log to [facility] with RADHA's message. [facility] will review and discuss with pt.

## 2019-08-27 NOTE — PROGRESS NOTES
No acute issues. RADHA reviewed BS log yesterday and multiple missing values and not taking insulin at certain times. Patient states sometimes she forgets to bring insulin with her and had her baby shower this weekend so she missed checking BS.  Reviewed im

## 2019-08-29 ENCOUNTER — HOSPITAL ENCOUNTER (OUTPATIENT)
Dept: PERINATAL CARE | Facility: HOSPITAL | Age: 29
Discharge: HOME | End: 2019-08-29
Attending: OBSTETRICS & GYNECOLOGY
Payer: MEDICAID

## 2019-08-29 ENCOUNTER — TELEPHONE (OUTPATIENT)
Dept: OBGYN CLINIC | Facility: CLINIC | Age: 29
End: 2019-08-29

## 2019-08-29 VITALS — HEART RATE: 84 BPM | DIASTOLIC BLOOD PRESSURE: 73 MMHG | SYSTOLIC BLOOD PRESSURE: 132 MMHG

## 2019-08-29 DIAGNOSIS — O24.414 INSULIN CONTROLLED WHITE CLASSIFICATION A2 GESTATIONAL DIABETES MELLITUS (GDM): Primary | ICD-10-CM

## 2019-08-29 PROCEDURE — 59025 FETAL NON-STRESS TEST: CPT | Performed by: OBSTETRICS & GYNECOLOGY

## 2019-08-29 NOTE — TELEPHONE ENCOUNTER
Spoke with pt using language line  #[id number]. Informed pt NJG reviewed BS log and per NJG increased NST to 2x a week and to inform pt of risk for stillbirth.  Stressed to pt the importance of taking insulin as instructed as pt would be at increases

## 2019-08-29 NOTE — ADDENDUM NOTE
Encounter addended by: Johnny Bennett RN on: 8/29/2019 9:38 AM   Actions taken: Visit Navigator Flowsheet section accepted

## 2019-08-29 NOTE — NST
Nonstress Test   Patient: Ghazal Lamar    Gestation: 37w1d    NST:       Variability: Moderate           Accelerations: Yes           Decelerations: None            Baseline: 130 BPM           Uterine Irritability: No           Contractions: Irregul

## 2019-09-02 ENCOUNTER — TELEPHONE (OUTPATIENT)
Dept: OBGYN UNIT | Facility: HOSPITAL | Age: 29
End: 2019-09-02

## 2019-09-03 ENCOUNTER — APPOINTMENT (OUTPATIENT)
Dept: OBGYN CLINIC | Facility: HOSPITAL | Age: 29
End: 2019-09-03
Attending: OBSTETRICS & GYNECOLOGY
Payer: MEDICAID

## 2019-09-03 ENCOUNTER — HOSPITAL ENCOUNTER (INPATIENT)
Facility: HOSPITAL | Age: 29
LOS: 3 days | Discharge: HOME | End: 2019-09-06
Attending: OBSTETRICS & GYNECOLOGY | Admitting: OBSTETRICS & GYNECOLOGY
Payer: MEDICAID

## 2019-09-03 ENCOUNTER — HOSPITAL ENCOUNTER (OUTPATIENT)
Facility: HOSPITAL | Age: 29
Setting detail: OBSERVATION
Discharge: HOME | End: 2019-09-03
Attending: OBSTETRICS & GYNECOLOGY | Admitting: OBSTETRICS & GYNECOLOGY
Payer: MEDICAID

## 2019-09-03 ENCOUNTER — TELEPHONE (OUTPATIENT)
Dept: OBGYN CLINIC | Facility: CLINIC | Age: 29
End: 2019-09-03

## 2019-09-03 VITALS
SYSTOLIC BLOOD PRESSURE: 127 MMHG | TEMPERATURE: 98 F | BODY MASS INDEX: 29.59 KG/M2 | DIASTOLIC BLOOD PRESSURE: 76 MMHG | HEART RATE: 62 BPM | HEIGHT: 63 IN | WEIGHT: 167 LBS

## 2019-09-03 PROBLEM — Z34.90 PREGNANCY: Status: ACTIVE | Noted: 2019-09-03

## 2019-09-03 PROBLEM — Z34.90 PREGNANCY (HCC): Status: ACTIVE | Noted: 2019-09-03

## 2019-09-03 LAB
ANTIBODY SCREEN: NEGATIVE
DEPRECATED RDW RBC AUTO: 44 FL (ref 35.1–46.3)
ERYTHROCYTE [DISTWIDTH] IN BLOOD BY AUTOMATED COUNT: 13.2 % (ref 11–15)
GLUCOSE BLDC GLUCOMTR-MCNC: 80 MG/DL (ref 70–99)
GLUCOSE BLDC GLUCOMTR-MCNC: 84 MG/DL (ref 70–99)
HCT VFR BLD AUTO: 39.8 % (ref 35–48)
HGB BLD-MCNC: 13.8 G/DL (ref 12–16)
MCH RBC QN AUTO: 31.8 PG (ref 26–34)
MCHC RBC AUTO-ENTMCNC: 34.7 G/DL (ref 31–37)
MCV RBC AUTO: 91.7 FL (ref 80–100)
PLATELET # BLD AUTO: 204 10(3)UL (ref 150–450)
RBC # BLD AUTO: 4.34 X10(6)UL (ref 3.8–5.3)
RH BLOOD TYPE: POSITIVE
WBC # BLD AUTO: 8.2 X10(3) UL (ref 4–11)

## 2019-09-03 PROCEDURE — 59025 FETAL NON-STRESS TEST: CPT | Performed by: OBSTETRICS & GYNECOLOGY

## 2019-09-03 RX ORDER — TRISODIUM CITRATE DIHYDRATE AND CITRIC ACID MONOHYDRATE 500; 334 MG/5ML; MG/5ML
30 SOLUTION ORAL AS NEEDED
Status: DISCONTINUED | OUTPATIENT
Start: 2019-09-03 | End: 2019-09-04 | Stop reason: HOSPADM

## 2019-09-03 RX ORDER — IBUPROFEN 600 MG/1
600 TABLET ORAL ONCE AS NEEDED
Status: DISCONTINUED | OUTPATIENT
Start: 2019-09-03 | End: 2019-09-04 | Stop reason: HOSPADM

## 2019-09-03 RX ORDER — AMMONIA INHALANTS 0.04 G/.3ML
0.3 INHALANT RESPIRATORY (INHALATION) AS NEEDED
Status: DISCONTINUED | OUTPATIENT
Start: 2019-09-03 | End: 2019-09-04 | Stop reason: HOSPADM

## 2019-09-03 RX ORDER — EPHEDRINE SULFATE/0.9% NACL/PF 25 MG/5 ML
5 SYRINGE (ML) INTRAVENOUS AS NEEDED
Status: DISCONTINUED | OUTPATIENT
Start: 2019-09-03 | End: 2019-09-06

## 2019-09-03 RX ORDER — TERBUTALINE SULFATE 1 MG/ML
0.25 INJECTION, SOLUTION SUBCUTANEOUS AS NEEDED
Status: DISCONTINUED | OUTPATIENT
Start: 2019-09-03 | End: 2019-09-04 | Stop reason: HOSPADM

## 2019-09-03 RX ORDER — BUPIVACAINE HYDROCHLORIDE 2.5 MG/ML
15 INJECTION, SOLUTION EPIDURAL; INFILTRATION; INTRACAUDAL ONCE
Status: COMPLETED | OUTPATIENT
Start: 2019-09-03 | End: 2019-09-03

## 2019-09-03 RX ORDER — SODIUM CHLORIDE, SODIUM LACTATE, POTASSIUM CHLORIDE, CALCIUM CHLORIDE 600; 310; 30; 20 MG/100ML; MG/100ML; MG/100ML; MG/100ML
INJECTION, SOLUTION INTRAVENOUS CONTINUOUS
Status: DISCONTINUED | OUTPATIENT
Start: 2019-09-03 | End: 2019-09-04 | Stop reason: HOSPADM

## 2019-09-03 RX ORDER — SODIUM CHLORIDE 0.9 % (FLUSH) 0.9 %
10 SYRINGE (ML) INJECTION AS NEEDED
Status: DISCONTINUED | OUTPATIENT
Start: 2019-09-03 | End: 2019-09-04 | Stop reason: HOSPADM

## 2019-09-03 RX ORDER — NALBUPHINE HCL 10 MG/ML
2.5 AMPUL (ML) INJECTION
Status: DISCONTINUED | OUTPATIENT
Start: 2019-09-03 | End: 2019-09-06

## 2019-09-03 RX ORDER — LIDOCAINE HYDROCHLORIDE AND EPINEPHRINE 20; 5 MG/ML; UG/ML
20 INJECTION, SOLUTION EPIDURAL; INFILTRATION; INTRACAUDAL; PERINEURAL ONCE
Status: DISCONTINUED | OUTPATIENT
Start: 2019-09-03 | End: 2019-09-06

## 2019-09-03 RX ORDER — LIDOCAINE HYDROCHLORIDE 10 MG/ML
30 INJECTION, SOLUTION EPIDURAL; INFILTRATION; INTRACAUDAL; PERINEURAL ONCE
Status: DISCONTINUED | OUTPATIENT
Start: 2019-09-03 | End: 2019-09-04 | Stop reason: HOSPADM

## 2019-09-03 RX ADMIN — LIDOCAINE HYDROCHLORIDE 3 ML: 10 INJECTION, SOLUTION EPIDURAL; INFILTRATION; INTRACAUDAL; PERINEURAL at 23:47:00

## 2019-09-03 RX ADMIN — BUPIVACAINE HYDROCHLORIDE 3 ML: 2.5 INJECTION, SOLUTION EPIDURAL; INFILTRATION; INTRACAUDAL at 23:54:00

## 2019-09-03 RX ADMIN — LIDOCAINE HYDROCHLORIDE AND EPINEPHRINE 5 ML: 15; 5 INJECTION, SOLUTION EPIDURAL at 23:50:00

## 2019-09-03 NOTE — PROGRESS NOTES
THERON MCKEON. 34YEAR OLD  37 6/7 WEEKS GESTATION    RECEIVED TO TRIAGE ROOM #4 PRESENTS C/O CTX WITH INCREASED INTENSITY @ 0300 AND ISOLATED OCCURRENCE OF LEAKING WHEN UP TO BATHROOM TO VOID, STATES +FM.  PT ASSISTED TO HOSPITAL GOWN, POC REVIEWED, CONSENTS OB

## 2019-09-03 NOTE — TELEPHONE ENCOUNTER
Paged on call and spoke with her  Carlo. She is dunia, scant bloody show when wiping after urination and possible LOF.  Directed to [facility] for eval.

## 2019-09-03 NOTE — TRIAGE
[facility]      Triage Note    Fady Dalton Patient Status:  Observation    1990 MRN [id number]   Location [address]  Attending Good Smith, DO   Hosp Day # 0 PCP MD Clayton Cervantes Oar No           Nonstress Test Interpretation: Reactive                      FHR Category: Category I           Additional Comments Comments: DR. DASILVA NOTIFIED OF REACTIVE TRACING     Reason for visit: PT PRESENTS TO TRIAGE C/O CTX AND LOF WHEN GETTING UP

## 2019-09-04 ENCOUNTER — ANESTHESIA EVENT (OUTPATIENT)
Dept: OBGYN UNIT | Facility: HOSPITAL | Age: 29
End: 2019-09-04
Payer: MEDICAID

## 2019-09-04 ENCOUNTER — TELEPHONE (OUTPATIENT)
Dept: OBGYN CLINIC | Facility: CLINIC | Age: 29
End: 2019-09-04

## 2019-09-04 ENCOUNTER — ANESTHESIA (OUTPATIENT)
Dept: OBGYN UNIT | Facility: HOSPITAL | Age: 29
End: 2019-09-04
Payer: MEDICAID

## 2019-09-04 LAB
GLUCOSE BLDC GLUCOMTR-MCNC: 111 MG/DL (ref 70–99)
GLUCOSE BLDC GLUCOMTR-MCNC: 88 MG/DL (ref 70–99)
GLUCOSE BLDC GLUCOMTR-MCNC: 89 MG/DL (ref 70–99)
GLUCOSE BLDC GLUCOMTR-MCNC: 89 MG/DL (ref 70–99)
GLUCOSE BLDC GLUCOMTR-MCNC: 93 MG/DL (ref 70–99)
GLUCOSE BLDC GLUCOMTR-MCNC: 96 MG/DL (ref 70–99)

## 2019-09-04 PROCEDURE — 0UQJXZZ REPAIR CLITORIS, EXTERNAL APPROACH: ICD-10-PCS | Performed by: OBSTETRICS & GYNECOLOGY

## 2019-09-04 PROCEDURE — 59409 OBSTETRICAL CARE: CPT | Performed by: OBSTETRICS & GYNECOLOGY

## 2019-09-04 RX ORDER — DIAPER,BRIEF,INFANT-TODD,DISP
1 EACH MISCELLANEOUS EVERY 6 HOURS PRN
Status: DISCONTINUED | OUTPATIENT
Start: 2019-09-04 | End: 2019-09-06

## 2019-09-04 RX ORDER — ONDANSETRON 2 MG/ML
4 INJECTION INTRAMUSCULAR; INTRAVENOUS EVERY 6 HOURS PRN
Status: DISCONTINUED | OUTPATIENT
Start: 2019-09-04 | End: 2019-09-06

## 2019-09-04 RX ORDER — DOCUSATE SODIUM 100 MG/1
100 CAPSULE, LIQUID FILLED ORAL 2 TIMES DAILY
Status: DISCONTINUED | OUTPATIENT
Start: 2019-09-04 | End: 2019-09-06

## 2019-09-04 RX ORDER — AMMONIA INHALANTS 0.04 G/.3ML
0.3 INHALANT RESPIRATORY (INHALATION) AS NEEDED
Status: DISCONTINUED | OUTPATIENT
Start: 2019-09-04 | End: 2019-09-06

## 2019-09-04 RX ORDER — SIMETHICONE 80 MG
80 TABLET,CHEWABLE ORAL 3 TIMES DAILY PRN
Status: DISCONTINUED | OUTPATIENT
Start: 2019-09-04 | End: 2019-09-06

## 2019-09-04 RX ORDER — LIDOCAINE HYDROCHLORIDE 10 MG/ML
INJECTION, SOLUTION EPIDURAL; INFILTRATION; INTRACAUDAL; PERINEURAL AS NEEDED
Status: DISCONTINUED | OUTPATIENT
Start: 2019-09-03 | End: 2019-09-04 | Stop reason: SURG

## 2019-09-04 RX ORDER — CHOLECALCIFEROL (VITAMIN D3) 25 MCG
1 TABLET,CHEWABLE ORAL DAILY
Status: DISCONTINUED | OUTPATIENT
Start: 2019-09-04 | End: 2019-09-06

## 2019-09-04 RX ORDER — BISACODYL 10 MG
10 SUPPOSITORY, RECTAL RECTAL ONCE AS NEEDED
Status: DISCONTINUED | OUTPATIENT
Start: 2019-09-04 | End: 2019-09-06

## 2019-09-04 RX ORDER — LIDOCAINE HYDROCHLORIDE AND EPINEPHRINE 15; 5 MG/ML; UG/ML
INJECTION, SOLUTION EPIDURAL
Status: COMPLETED | OUTPATIENT
Start: 2019-09-03 | End: 2019-09-03

## 2019-09-04 RX ORDER — IBUPROFEN 600 MG/1
600 TABLET ORAL EVERY 6 HOURS PRN
Status: DISCONTINUED | OUTPATIENT
Start: 2019-09-04 | End: 2019-09-06

## 2019-09-04 RX ORDER — SODIUM CHLORIDE 0.9 % (FLUSH) 0.9 %
10 SYRINGE (ML) INJECTION AS NEEDED
Status: DISCONTINUED | OUTPATIENT
Start: 2019-09-04 | End: 2019-09-06

## 2019-09-04 RX ORDER — PRENATAL VIT/IRON FUM/FOLIC AC 27MG-0.8MG
1 TABLET ORAL DAILY
Status: DISCONTINUED | OUTPATIENT
Start: 2019-09-04 | End: 2019-09-04

## 2019-09-04 NOTE — ANESTHESIA PREPROCEDURE EVALUATION
Anesthesia PreOp Note    HPI:     Morena Méndez is a 34year old female who presents for preoperative consultation requested by: * No surgeons listed *    Date of Surgery: 9/3/2019 - 9/4/2019    * No procedures listed *  Indication: * No pre-op diagn Disp: 1 Device Rfl: 0 Taking   Glucose Blood (KERRI CONTOUR TEST) In Vitro Strip 1 strip by Finger stick route 4 (four) times daily. Disp: 1 strip Rfl: 5 Taking   Acetone, Urine, Test (KETOSTIX) In Vitro Strip Test morning urine once daily.  Disp: 100 strip mL 15 mL Epidural Once Wade Gaytan MD     lidocaine-EPINEPHrine 2 %-1:[id number] injection 20 mL 20 mL Epidural Once Wade Gaytan MD       No current [facility]-ordered outpatient medications on file.     No Known Allergies    Family History   Problem Relati 13.8 09/03/2019    HCT 39.8 09/03/2019    MCV 91.7 09/03/2019    MCH 31.8 09/03/2019    MCHC 34.7 09/03/2019    RDW 13.2 09/03/2019    .0 09/03/2019     Lab Results   Component Value Date     07/23/2019    K 4.0 07/23/2019     07/23/2019 patient's questions were answered to the best of my ability. The patient desires the anesthetic management as planned.   Kenia Salvador  9/4/2019 12:06 AM

## 2019-09-04 NOTE — ANESTHESIA POSTPROCEDURE EVALUATION
Patient: Shavonne Clamp    Procedure Summary     Date:  09/03/19 Room / Location:      Anesthesia Start:  2346 Anesthesia Stop:  09/04/19 0231    Procedure:  LABOR ANALGESIA Diagnosis:      Scheduled Providers:   Anesthesiologist:  Kevin Frazier MD

## 2019-09-04 NOTE — L&D DELIVERY NOTE
[place] [facility]    Vaginal Delivery Note    Jesisca Javier Patient Status:  Inpatient    1990 MRN [id number]   Location [address]  Attending Audrey Clayton, [address] [facility] Day # 1 PCP Rosalino Perry MD

## 2019-09-04 NOTE — H&P
[facility] 9W Patient Status:  Inpatient    1990 MRN [id number]   Location [address] [place] [address] Attending Gaurav Queen, [address] [facility] Day # 0 PCP Mariana Gonzales MD Schedule Colpo in pregnancy.        Obstetric History:   OB History    Para Term  AB Living   3 1 1   1 1   SAB TAB Ectopic Multiple Live Births   1       1      # Outcome Date GA Lbr Gerry/2nd Weight Sex Delivery Anes PTL Lv   3 Cu daily. Disp: 1 Device Rfl: 0 Taking   Glucose Blood (KERRI CONTOUR TEST) In Vitro Strip 1 strip by Finger stick route 4 (four) times daily. Disp: 1 strip Rfl: 5 Taking   Acetone, Urine, Test (KETOSTIX) In Vitro Strip Test morning urine once daily.  Disp: 10

## 2019-09-04 NOTE — PAYOR COMM NOTE
--------------  ADMISSION REVIEW     Payor: Jeet Gonzalese #:  [id number]  Authorization Number: [id number]    Admit date: 9/3/19  Admit time: 1909       Admitting Physician: Stephanie Raymond DO  Attending Tom Thao ( 5.9 % )             Opthal baseline [ x ] 1st & [  ] 3rd trimester, [            ] Fetal echo -- insurance declined coverage, [x  ]            level 2 U/S, [  ] Serial MFM growth u/s, [  ]            delivery 38 wks, [  ] weekly BPPs at 32 weeks then dinner and 10 units at bedtime   Disp:  Rfl:  9/2/2019 at 1700   Insulin Lispro (HUMALOG) 100 UNIT/ML Subcutaneous Solution Inject 2 Units into the skin daily with dinner.  (Patient taking differently: Inject 14 Units into the skin daily with dinner.  ) Dis neg  5. Labor: given latent labor and progressively becoming more uncomfortable, will allow pt to labor and augment with pitocin prn  6. Class B DM: Taking 0+4+14+14N. q2h ACs in early labor and q1h once active  7.  Okay for IV narcs in early labor and epi stable in the delivery room with nursing present.  Sponge and needle counts verified.          Kimberley Valentin DO   9/4/2019  2:45 AM

## 2019-09-04 NOTE — ANESTHESIA PROCEDURE NOTES
Labor Analgesia  Date/Time: 9/3/2019 11:50 PM  Performed by: Rajinder Alvarez MD  Authorized by: Rajinder Alvarez MD     Patient Location:  OB  Start Time:  9/3/2019 11:46 PM  End Time:  9/4/2019 12:02 AM  Reason for Block: labor epidural    Anesthesiologi

## 2019-09-04 NOTE — DISCHARGE SUMMARY
[place] [facility] - [facility]    Discharge Summary    Leila Hawthorne Patient Status:  Inpatient    1990 MRN [id number]   Location [address] Attending Paige Elkins, [address] [facility] Day # 3       Delivering OB Clinician

## 2019-09-04 NOTE — LACTATION NOTE
This note was copied from a baby's chart. LACTATION NOTE - INFANT    Evaluation Type  Evaluation Type: Inpatient    Problems & Assessment  Problems Diagnosed or Identified: Sleepy  Infant Assessment: Anterior fontanel soft and flat; Abdomen soft, non-diste

## 2019-09-04 NOTE — PLAN OF CARE
Problem: Patient Centered Care  Goal: Patient preferences are identified and integrated in the patient's plan of care  Description  Interventions:  - What would you like us to know as we care for you?   - Provide timely, complete, and accurate informatio goal  Description  INTERVENTIONS:  - Encourage pt to monitor pain and request assistance  - Assess pain using appropriate pain scale  - Administer analgesics based on type and severity of pain and evaluate response  - Implement non-pharmacological measures

## 2019-09-04 NOTE — PROGRESS NOTES
Called to bedside by RN. Prolapsing membranes from vagina. Patient got up to go to bathroom and clots passed with membranes prolapsing. Ring forceps used to tease from cervix/vagina.   Right angles used to visualize cervix and no membranes visualized the

## 2019-09-04 NOTE — LACTATION NOTE
LACTATION NOTE - MOTHER      Evaluation Type: Inpatient         Maternal history  Maternal history: Gestational diabetes    Breastfeeding goal  Breastfeeding goal: To maintain breast milk feeding per patient goal    Maternal Assessment  Bilateral Breasts:

## 2019-09-05 LAB
BASOPHILS # BLD AUTO: 0.03 X10(3) UL (ref 0–0.2)
BASOPHILS NFR BLD AUTO: 0.3 %
DEPRECATED RDW RBC AUTO: 46 FL (ref 35.1–46.3)
EOSINOPHIL # BLD AUTO: 0.02 X10(3) UL (ref 0–0.7)
EOSINOPHIL NFR BLD AUTO: 0.2 %
ERYTHROCYTE [DISTWIDTH] IN BLOOD BY AUTOMATED COUNT: 13.7 % (ref 11–15)
GLUCOSE BLDC GLUCOMTR-MCNC: 100 MG/DL (ref 70–99)
GLUCOSE BLDC GLUCOMTR-MCNC: 107 MG/DL (ref 70–99)
GLUCOSE BLDC GLUCOMTR-MCNC: 113 MG/DL (ref 70–99)
GLUCOSE BLDC GLUCOMTR-MCNC: 61 MG/DL (ref 70–99)
GLUCOSE BLDC GLUCOMTR-MCNC: 67 MG/DL (ref 70–99)
GLUCOSE BLDC GLUCOMTR-MCNC: 94 MG/DL (ref 70–99)
GLUCOSE BLDC GLUCOMTR-MCNC: 94 MG/DL (ref 70–99)
HCT VFR BLD AUTO: 37.1 % (ref 35–48)
HGB BLD-MCNC: 12.7 G/DL (ref 12–16)
IMM GRANULOCYTES # BLD AUTO: 0.04 X10(3) UL (ref 0–1)
IMM GRANULOCYTES NFR BLD: 0.4 %
LYMPHOCYTES # BLD AUTO: 3.09 X10(3) UL (ref 1–4)
LYMPHOCYTES NFR BLD AUTO: 31.3 %
MCH RBC QN AUTO: 31.8 PG (ref 26–34)
MCHC RBC AUTO-ENTMCNC: 34.2 G/DL (ref 31–37)
MCV RBC AUTO: 92.8 FL (ref 80–100)
MONOCYTES # BLD AUTO: 0.37 X10(3) UL (ref 0.1–1)
MONOCYTES NFR BLD AUTO: 3.7 %
NEUTROPHILS # BLD AUTO: 6.32 X10 (3) UL (ref 1.5–7.7)
NEUTROPHILS # BLD AUTO: 6.32 X10(3) UL (ref 1.5–7.7)
NEUTROPHILS NFR BLD AUTO: 64.1 %
PLATELET # BLD AUTO: 200 10(3)UL (ref 150–450)
RBC # BLD AUTO: 4 X10(6)UL (ref 3.8–5.3)
WBC # BLD AUTO: 9.9 X10(3) UL (ref 4–11)

## 2019-09-05 NOTE — PLAN OF CARE
Problem: ANXIETY  Goal: Will report anxiety at manageable levels  Description  INTERVENTIONS:  - Administer medication as ordered  - Teach and rehearse alternative coping skills  - Provide emotional support with 1:1 interaction with staff  Outcome: Compl

## 2019-09-05 NOTE — LACTATION NOTE
This note was copied from a baby's chart. Per father of baby infant recently had a bottle of formula, states infant seems hungry after feeds so they began supplementing, will begin pumping once home.  Educated on supply/demand, encouraged ex BF and to call

## 2019-09-05 NOTE — PROGRESS NOTES
[place] [facility] - [facility]    Post  Progress Note      Lonnie Chris Patient Status:  Inpatient    1990 MRN [id number]   Location [facility] 3SE Attending Shandra Jackson, [address] [facility] Day # 2 PCP MD Susanne Schreiber

## 2019-09-05 NOTE — ADDENDUM NOTE
Encounter addended by: Emily Rubi MD on: 9/5/2019 10:49 AM   Actions taken: Sign clinical note, Charge Capture section accepted

## 2019-09-06 VITALS
OXYGEN SATURATION: 100 % | RESPIRATION RATE: 16 BRPM | HEART RATE: 109 BPM | DIASTOLIC BLOOD PRESSURE: 76 MMHG | SYSTOLIC BLOOD PRESSURE: 130 MMHG | TEMPERATURE: 98 F

## 2019-09-06 PROBLEM — Z34.90 PREGNANCY: Status: RESOLVED | Noted: 2019-09-03 | Resolved: 2019-09-06

## 2019-09-06 PROBLEM — Z34.90 PREGNANCY (HCC): Status: RESOLVED | Noted: 2019-09-03 | Resolved: 2019-09-06

## 2019-09-06 LAB
GLUCOSE BLDC GLUCOMTR-MCNC: 108 MG/DL (ref 70–99)
GLUCOSE BLDC GLUCOMTR-MCNC: 138 MG/DL (ref 70–99)
GLUCOSE BLDC GLUCOMTR-MCNC: 68 MG/DL (ref 70–99)
GLUCOSE BLDC GLUCOMTR-MCNC: 82 MG/DL (ref 70–99)
GLUCOSE BLDC GLUCOMTR-MCNC: 98 MG/DL (ref 70–99)

## 2019-09-06 RX ORDER — IBUPROFEN 600 MG/1
600 TABLET ORAL EVERY 6 HOURS PRN
Qty: 30 TABLET | Refills: 0 | Status: SHIPPED | OUTPATIENT
Start: 2019-09-06 | End: 2020-06-17

## 2019-09-06 NOTE — PLAN OF CARE
Patient passed 1 large blood clot resembling this time. Clot weighing 100 cc. Fundus firm, midline, bleeding small. Blood clot shown to in house MD. MD [employer] notified, no new orders.

## 2019-09-06 NOTE — PLAN OF CARE
Problem: Patient Centered Care  Goal: Patient preferences are identified and integrated in the patient's plan of care  Description  Interventions:  - What would you like us to know as we care for you?  Provide Welsh translation as needed  - Provide time response  - Consider cultural and social influences on pain and pain management  - Manage/alleviate anxiety  - Utilize distraction and/or relaxation techniques  - Monitor for opioid side effects  - Notify MD/LIP if interventions unsuccessful or patient rep footstool/pillows, and breast pumps). - Encourage mother/other family members to express feelings/concerns, and actively listen. - Educate father/SO about benefits of breast feeding and how to manage common lactation challenges.   - Recommend avoidance of

## 2019-09-06 NOTE — PLAN OF CARE
Problem: Patient Centered Care  Goal: Patient preferences are identified and integrated in the patient's plan of care  Description  Interventions:  - What would you like us to know as we care for you?   - Provide timely, complete, and accurate informatio and/or relaxation techniques  - Monitor for opioid side effects  - Notify MD/LIP if interventions unsuccessful or patient reports new pain  - Anticipate increased pain with activity and pre-medicate as appropriate  Outcome: Progressing     Problem: POSTPAR Educate father/SO about benefits of breast feeding and how to manage common lactation challenges. - Recommend avoidance of specific medications or substances incompatible with breast feeding.  - Assess and monitor for signs of nipple pain/trauma.   - Instr

## 2019-09-06 NOTE — PAYOR COMM NOTE
--------------  CONTINUED STAY REVIEW    Payor: Jeet Joyce #:  [id number]  Authorization Number: [id number]    Admit date: 9/3/19  Admit time: 1909    Admitting Physician: Isa Arellano DO  Attending [employer]

## 2019-09-06 NOTE — PROGRESS NOTES
[place] [facility] - [facility]    Post  Progress Note      Jessica Javier Patient Status:  Inpatient    1990 MRN [id number]   Location [facility] 3SE Attending Audrey Clayton, [address] [facility] Day # 3 PCP MD Oma Spangler

## 2019-09-25 NOTE — ADDENDUM NOTE
Encounter addended by: Alicia Drew DO on: 9/24/2019 11:10 PM   Actions taken: Sign clinical note, Charge Capture section accepted

## 2019-10-05 NOTE — NST
Nonstress Test   Patient: Drelistine Maplewood    Gestation: 36w5d    NST: pre gest dm     Variability: Moderate           Accelerations: Yes           Decelerations: Variable            Baseline: 125 BPM           Uterine Irritability: Yes           Contra

## 2019-10-06 NOTE — NST
Nonstress Test   Patient: Maximus Olson    Gestation: 36w5d    NST: pre gest dm     Variability: Moderate           Accelerations: Yes           Decelerations: None            Baseline: 130 BPM           Uterine Irritability: No           Contraction

## 2019-10-06 NOTE — ADDENDUM NOTE
Encounter addended by: Reshma Haro DO on: 10/5/2019 8:49 PM   Actions taken: Charge Capture section accepted, Sign clinical note

## 2019-10-16 NOTE — ADDENDUM NOTE
Encounter addended by: Zack Goldberg, DO on: 10/16/2019 2:13 PM   Actions taken: Clinical Note Signed, Charge Capture section accepted

## 2019-10-18 ENCOUNTER — POSTPARTUM (OUTPATIENT)
Dept: OBGYN CLINIC | Facility: CLINIC | Age: 29
End: 2019-10-18
Payer: MEDICAID

## 2019-10-18 VITALS
DIASTOLIC BLOOD PRESSURE: 79 MMHG | SYSTOLIC BLOOD PRESSURE: 122 MMHG | HEART RATE: 80 BPM | WEIGHT: 157.63 LBS | BODY MASS INDEX: 28 KG/M2

## 2019-10-18 DIAGNOSIS — Z01.419 WELL WOMAN EXAM: Primary | ICD-10-CM

## 2019-10-18 DIAGNOSIS — Z86.32 HISTORY OF GESTATIONAL DIABETES MELLITUS (GDM): ICD-10-CM

## 2019-10-18 PROBLEM — Z78.9 HISTORY OF FOREIGN TRAVEL: Status: RESOLVED | Noted: 2019-05-28 | Resolved: 2019-10-18

## 2019-10-18 PROBLEM — O12.10 PROTEINURIA AFFECTING PREGNANCY: Status: RESOLVED | Noted: 2019-04-23 | Resolved: 2019-10-18

## 2019-10-18 PROBLEM — O24.319 PREGESTATIONAL DIABETES MELLITUS, MODIFIED WHITE CLASS B: Status: RESOLVED | Noted: 2019-04-05 | Resolved: 2019-10-18

## 2019-10-18 PROBLEM — O24.319 PREGESTATIONAL DIABETES MELLITUS, MODIFIED WHITE CLASS B (HCC): Status: RESOLVED | Noted: 2019-04-05 | Resolved: 2019-10-18

## 2019-10-18 PROBLEM — O12.10 PROTEINURIA AFFECTING PREGNANCY (HCC): Status: RESOLVED | Noted: 2019-04-23 | Resolved: 2019-10-18

## 2019-10-18 PROCEDURE — 0503F POSTPARTUM CARE VISIT: CPT | Performed by: OBSTETRICS & GYNECOLOGY

## 2019-10-18 NOTE — H&P
MIKHAIL    Krupa Branham is a 34year old female  here for 6 week post-partum visit. Patient delivered a  male infant on 19 via . Patient desires vasectomy for contraception. Patient is formula feeding.    Patient denies symptoms of depr Outpatient Medications:   •  PRENATAL 27-0.8 MG Oral Tab, Take 1 tablet by mouth daily. , Disp: , Rfl:   •  ibuprofen 600 MG Oral Tab, Take 1 tablet (600 mg total) by mouth every 6 (six) hours as needed. , Disp: 30 tablet, Rfl: 0  •  INSULIN SYRINGE 30G X 5/

## 2019-10-28 ENCOUNTER — OFFICE VISIT (OUTPATIENT)
Dept: OBGYN CLINIC | Facility: CLINIC | Age: 29
End: 2019-10-28
Payer: MEDICAID

## 2019-10-28 VITALS — DIASTOLIC BLOOD PRESSURE: 85 MMHG | HEART RATE: 72 BPM | SYSTOLIC BLOOD PRESSURE: 122 MMHG

## 2019-10-28 DIAGNOSIS — R87.612 LOW GRADE SQUAMOUS INTRAEPITH LESION ON CYTOLOGIC SMEAR CERVIX (LGSIL): Primary | ICD-10-CM

## 2019-10-28 DIAGNOSIS — Z32.00 PREGNANCY EXAMINATION OR TEST, PREGNANCY UNCONFIRMED: ICD-10-CM

## 2019-10-28 PROCEDURE — 81025 URINE PREGNANCY TEST: CPT | Performed by: OBSTETRICS & GYNECOLOGY

## 2019-10-28 PROCEDURE — 57454 BX/CURETT OF CERVIX W/SCOPE: CPT | Performed by: OBSTETRICS & GYNECOLOGY

## 2019-10-28 NOTE — PROCEDURES
Colpo w/Cx Biopsy and ECC    Pregnancy Results: negative from urine test   Birth control method(s) used: n/a    Consent signed. Procedure discussed with patient in detail including indication, risk, benefits, alternatives and complications.     Indicatio

## 2019-10-29 ENCOUNTER — LABORATORY ENCOUNTER (OUTPATIENT)
Dept: LAB | Age: 29
End: 2019-10-29
Attending: OBSTETRICS & GYNECOLOGY
Payer: MEDICAID

## 2019-10-29 ENCOUNTER — APPOINTMENT (OUTPATIENT)
Dept: LAB | Age: 29
End: 2019-10-29
Attending: OBSTETRICS & GYNECOLOGY
Payer: MEDICAID

## 2019-10-29 DIAGNOSIS — Z86.32 HISTORY OF GESTATIONAL DIABETES MELLITUS (GDM): ICD-10-CM

## 2019-10-29 PROCEDURE — 82951 GLUCOSE TOLERANCE TEST (GTT): CPT

## 2019-10-29 PROCEDURE — 36415 COLL VENOUS BLD VENIPUNCTURE: CPT

## 2019-11-01 ENCOUNTER — TELEPHONE (OUTPATIENT)
Dept: OBGYN CLINIC | Facility: CLINIC | Age: 29
End: 2019-11-01

## 2019-11-01 NOTE — TELEPHONE ENCOUNTER
Spoke to [place], RN who will call patient to discuss results (pt is Luxembourgish speaking). ?RPOC vs placental site nodule on ECC. Plan for quant hcg and pelvic US to evaluate.

## 2019-11-01 NOTE — TELEPHONE ENCOUNTER
Received colpo pathology result from [address] [place] [address] St, states possible retained placenta tissue in cervix. Orders to call pt to notify and check if pt is having any bleeding. (Quant and Pelvis US ordered per [facility]A).  Called home # listed and  answered, states pt ca

## 2019-11-02 ENCOUNTER — APPOINTMENT (OUTPATIENT)
Dept: LAB | Facility: HOSPITAL | Age: 29
End: 2019-11-02
Attending: OBSTETRICS & GYNECOLOGY
Payer: MEDICAID

## 2019-11-02 PROCEDURE — 84702 CHORIONIC GONADOTROPIN TEST: CPT

## 2019-11-02 PROCEDURE — 36415 COLL VENOUS BLD VENIPUNCTURE: CPT

## 2019-11-11 NOTE — TELEPHONE ENCOUNTER
Received fax notice from [facility] of approval for [address], [address]. Form to scanning.

## 2019-11-13 ENCOUNTER — HOSPITAL ENCOUNTER (OUTPATIENT)
Dept: ULTRASOUND IMAGING | Facility: HOSPITAL | Age: 29
Discharge: HOME | End: 2019-11-13
Attending: OBSTETRICS & GYNECOLOGY
Payer: MEDICAID

## 2019-11-13 PROCEDURE — 76856 US EXAM PELVIC COMPLETE: CPT | Performed by: OBSTETRICS & GYNECOLOGY

## 2019-11-13 PROCEDURE — 76830 TRANSVAGINAL US NON-OB: CPT | Performed by: OBSTETRICS & GYNECOLOGY

## 2019-12-12 ENCOUNTER — TELEPHONE (OUTPATIENT)
Dept: OBGYN CLINIC | Facility: CLINIC | Age: 29
End: 2019-12-12

## 2019-12-13 NOTE — TELEPHONE ENCOUNTER
Informed pt and pt  (LEO signed) of results and [facility] recs below. Pt and pt  verbalized understanding.

## 2020-03-23 ENCOUNTER — TELEPHONE (OUTPATIENT)
Dept: OBGYN CLINIC | Facility: CLINIC | Age: 30
End: 2020-03-23

## 2020-03-23 NOTE — TELEPHONE ENCOUNTER
states pt is due to return on Friday 3/27 night.  does not know LMP. Advised that pt call us when she returns because we need to know LMP and if she has monthly periods.  states he will ask her and call back.

## 2020-03-23 NOTE — TELEPHONE ENCOUNTER
Per spouse pt is in [facility] right now took a HPT and came out positive unsure of LMP.  Please advise

## 2020-03-28 ENCOUNTER — TELEPHONE (OUTPATIENT)
Dept: OBGYN CLINIC | Facility: CLINIC | Age: 30
End: 2020-03-28

## 2020-03-30 NOTE — TELEPHONE ENCOUNTER
Spoke with both pt and pt  via speakerphone (LEO signed). Pt reports +HPT with  lmp 2/19/2020 5w5d with monthly menses. Pt agrees to seeing all 6 providers 4 female and 2 male.  Pt aware first appt is with RN and not MD. Pt reports taking OTV PNV julisa

## 2020-04-08 ENCOUNTER — NURSE ONLY (OUTPATIENT)
Dept: OBGYN CLINIC | Facility: CLINIC | Age: 30
End: 2020-04-08
Payer: MEDICAID

## 2020-04-08 ENCOUNTER — TELEPHONE (OUTPATIENT)
Dept: OBGYN CLINIC | Facility: CLINIC | Age: 30
End: 2020-04-08

## 2020-04-08 VITALS — BODY MASS INDEX: 24.1 KG/M2 | HEIGHT: 63 IN | WEIGHT: 136 LBS

## 2020-04-08 DIAGNOSIS — Z34.81 ENCOUNTER FOR SUPERVISION OF OTHER NORMAL PREGNANCY IN FIRST TRIMESTER: Primary | ICD-10-CM

## 2020-04-08 NOTE — TELEPHONE ENCOUNTER
Pt had her OBN PC appt today. Pt states that she had some pink spotting two weeks ago. Denied IC or BM. Denies any spotting now. Sent to MD at Home, Lorene Gaffney [phone].

## 2020-04-08 NOTE — TELEPHONE ENCOUNTER
Used E. PRITI David [id number]. Informed pt to monitor and if bleeding recurs call us. Pt stated understanding.

## 2020-04-08 NOTE — PROGRESS NOTES
Pt seen for OBN appt today with no complaints. Normal PN labs ordered, 1 hr gtt, varicella and hcg. Pt advised all labs must be completed and resulted prior to MD appt.     Used language line, [id number]    Partner's name is Asha Smith, contact # [phone] Thalassemia ([facility], [facility], [address] [facility], or  background):  MCV less than 80:  No   Neural tube defect (Meningomyelocele, Spina bifida, or Anencephaly):  No   Congenital heart defect:  No   Down syndrome:  No   [facility] [place] [place], [facility] Principal Discharge DX:	Lumbar radiculopathy  Goal:	Strengthening  Assessment and plan of treatment:	Call Neurosurgery Dr MADHAV De La Rosa for appointment in 1 week for wound check and staple removal.  Followup with your Private MD in 1-2 weeks.  Return to Emergency Department or contact your Neurosurgeon if any changes in mental status, weakness, numbness or tingling of extremities; difficulty swallowing; drainage or redness of wound, fever; pain in legs; difficulty urinating or constipation.  Donot restart your Aspirin or take any Motrin/NSAIDS until checking with your Neurosurgeon.  Assessment and plan of treatment:	No strenous activity. No heavy lifting. Do not return to work until cleared by physician. No driving until cleared by physician.  Remove dressing on 3rd day after your surgery and leave incision open to air. You may shower on the 3rd day after you surgery.  No soaking in tub,  Donot apply any ointements to incision.

## 2020-04-15 ENCOUNTER — LAB ENCOUNTER (OUTPATIENT)
Dept: LAB | Facility: HOSPITAL | Age: 30
End: 2020-04-15
Attending: OBSTETRICS & GYNECOLOGY
Payer: MEDICAID

## 2020-04-15 DIAGNOSIS — Z34.81 ENCOUNTER FOR SUPERVISION OF OTHER NORMAL PREGNANCY IN FIRST TRIMESTER: ICD-10-CM

## 2020-04-15 PROCEDURE — 86901 BLOOD TYPING SEROLOGIC RH(D): CPT

## 2020-04-15 PROCEDURE — 82950 GLUCOSE TEST: CPT

## 2020-04-15 PROCEDURE — 36415 COLL VENOUS BLD VENIPUNCTURE: CPT

## 2020-04-15 PROCEDURE — 86850 RBC ANTIBODY SCREEN: CPT

## 2020-04-15 PROCEDURE — 86780 TREPONEMA PALLIDUM: CPT

## 2020-04-15 PROCEDURE — 87340 HEPATITIS B SURFACE AG IA: CPT

## 2020-04-15 PROCEDURE — 86762 RUBELLA ANTIBODY: CPT

## 2020-04-15 PROCEDURE — 86900 BLOOD TYPING SEROLOGIC ABO: CPT

## 2020-04-15 PROCEDURE — 84703 CHORIONIC GONADOTROPIN ASSAY: CPT

## 2020-04-15 PROCEDURE — 87086 URINE CULTURE/COLONY COUNT: CPT

## 2020-04-15 PROCEDURE — 85025 COMPLETE CBC W/AUTO DIFF WBC: CPT

## 2020-04-15 PROCEDURE — 87389 HIV-1 AG W/HIV-1&-2 AB AG IA: CPT

## 2020-04-15 PROCEDURE — 86787 VARICELLA-ZOSTER ANTIBODY: CPT

## 2020-04-20 ENCOUNTER — INITIAL PRENATAL (OUTPATIENT)
Dept: OBGYN CLINIC | Facility: CLINIC | Age: 30
End: 2020-04-20
Payer: MEDICAID

## 2020-04-20 ENCOUNTER — TELEPHONE (OUTPATIENT)
Dept: OBGYN CLINIC | Facility: CLINIC | Age: 30
End: 2020-04-20

## 2020-04-20 VITALS
SYSTOLIC BLOOD PRESSURE: 123 MMHG | BODY MASS INDEX: 27 KG/M2 | HEART RATE: 90 BPM | WEIGHT: 154 LBS | DIASTOLIC BLOOD PRESSURE: 77 MMHG

## 2020-04-20 DIAGNOSIS — O26.841 UTERINE SIZE-DATE DISCREPANCY IN FIRST TRIMESTER: ICD-10-CM

## 2020-04-20 DIAGNOSIS — Z34.91 ENCOUNTER FOR SUPERVISION OF NORMAL PREGNANCY IN FIRST TRIMESTER, UNSPECIFIED GRAVIDITY: Primary | ICD-10-CM

## 2020-04-20 PROBLEM — Z86.32 HISTORY OF GESTATIONAL DIABETES IN PRIOR PREGNANCY, CURRENTLY PREGNANT: Status: ACTIVE | Noted: 2020-04-20

## 2020-04-20 PROBLEM — Z86.32 HISTORY OF GESTATIONAL DIABETES IN PRIOR PREGNANCY, CURRENTLY PREGNANT (HCC): Status: ACTIVE | Noted: 2020-04-20

## 2020-04-20 PROBLEM — O99.891 ZIKA VIRUS EXPOSURE AFFECTING PREGNANCY: Status: ACTIVE | Noted: 2020-04-20

## 2020-04-20 PROBLEM — Z20.821 ZIKA VIRUS EXPOSURE AFFECTING PREGNANCY: Status: ACTIVE | Noted: 2020-04-20

## 2020-04-20 PROBLEM — O09.299 HISTORY OF GESTATIONAL DIABETES IN PRIOR PREGNANCY, CURRENTLY PREGNANT (HCC): Status: ACTIVE | Noted: 2020-04-20

## 2020-04-20 PROBLEM — O99.891 ZIKA VIRUS EXPOSURE AFFECTING PREGNANCY (HCC): Status: ACTIVE | Noted: 2020-04-20

## 2020-04-20 PROBLEM — O09.299 HISTORY OF GESTATIONAL DIABETES IN PRIOR PREGNANCY, CURRENTLY PREGNANT: Status: ACTIVE | Noted: 2020-04-20

## 2020-04-20 PROBLEM — Z20.821 ZIKA VIRUS EXPOSURE AFFECTING PREGNANCY (HCC): Status: ACTIVE | Noted: 2020-04-20

## 2020-04-20 PROCEDURE — 81002 URINALYSIS NONAUTO W/O SCOPE: CPT | Performed by: OBSTETRICS & GYNECOLOGY

## 2020-04-20 PROCEDURE — 0500F INITIAL PRENATAL CARE VISIT: CPT | Performed by: OBSTETRICS & GYNECOLOGY

## 2020-04-20 NOTE — PROGRESS NOTES
New OB. Bulgarian speaking. Dev Nina RN, present for translation. No issues. Declines geenntics. Class B DM with last pregnancy. Passed early 1h gtt. PE shows uterus size > dates. Pap and cultures collected. BSUS shows 11+ week fetus. +FHT.   Will get

## 2020-04-21 ENCOUNTER — TELEPHONE (OUTPATIENT)
Dept: OBGYN CLINIC | Facility: CLINIC | Age: 30
End: 2020-04-21

## 2020-04-22 ENCOUNTER — TELEPHONE (OUTPATIENT)
Dept: OBGYN CLINIC | Facility: CLINIC | Age: 30
End: 2020-04-22

## 2020-04-22 NOTE — TELEPHONE ENCOUNTER
Pt is 9w4d, Congolese speaking,  states she has had 1 week of constant 4/10 ear pain and also a throat pain that  \"comes and goes\". Sometimes both ears, sometimes one. Pt denies cough and congestion. No fever.  Pt saw [address] earlier this week and forgot

## 2020-04-25 ENCOUNTER — TELEPHONE (OUTPATIENT)
Dept: OBGYN CLINIC | Facility: CLINIC | Age: 30
End: 2020-04-25

## 2020-04-25 NOTE — TELEPHONE ENCOUNTER
----- Message from Lyndsay Sloan DO sent at 4/24/2020 10:54 AM CDT -----  Please notify of results. BV on pap.   If symptomatic, okay for flagyl or metrogel

## 2020-04-28 NOTE — TELEPHONE ENCOUNTER
Informed pt and  of results below. Pt denies vaginal odor and vaginal discharge. Pt and  verbalized understanding.

## 2020-05-05 ENCOUNTER — TELEPHONE (OUTPATIENT)
Dept: OBGYN CLINIC | Facility: CLINIC | Age: 30
End: 2020-05-05

## 2020-05-11 ENCOUNTER — HOSPITAL ENCOUNTER (OUTPATIENT)
Dept: ULTRASOUND IMAGING | Facility: HOSPITAL | Age: 30
Discharge: HOME | End: 2020-05-11
Attending: OBSTETRICS & GYNECOLOGY
Payer: MEDICAID

## 2020-05-11 DIAGNOSIS — O26.841 UTERINE SIZE-DATE DISCREPANCY IN FIRST TRIMESTER: ICD-10-CM

## 2020-05-11 PROCEDURE — 76801 OB US < 14 WKS SINGLE FETUS: CPT | Performed by: OBSTETRICS & GYNECOLOGY

## 2020-05-20 ENCOUNTER — ROUTINE PRENATAL (OUTPATIENT)
Dept: OBGYN CLINIC | Facility: CLINIC | Age: 30
End: 2020-05-20
Payer: MEDICAID

## 2020-05-20 VITALS
HEART RATE: 86 BPM | SYSTOLIC BLOOD PRESSURE: 119 MMHG | BODY MASS INDEX: 28 KG/M2 | DIASTOLIC BLOOD PRESSURE: 74 MMHG | WEIGHT: 156.38 LBS

## 2020-05-20 DIAGNOSIS — Z34.82 ENCOUNTER FOR SUPERVISION OF OTHER NORMAL PREGNANCY IN SECOND TRIMESTER: Primary | ICD-10-CM

## 2020-05-20 PROCEDURE — 81002 URINALYSIS NONAUTO W/O SCOPE: CPT | Performed by: OBSTETRICS & GYNECOLOGY

## 2020-05-20 PROCEDURE — 0502F SUBSEQUENT PRENATAL CARE: CPT | Performed by: OBSTETRICS & GYNECOLOGY

## 2020-05-20 PROCEDURE — 3074F SYST BP LT 130 MM HG: CPT | Performed by: OBSTETRICS & GYNECOLOGY

## 2020-05-20 PROCEDURE — 3078F DIAST BP <80 MM HG: CPT | Performed by: OBSTETRICS & GYNECOLOGY

## 2020-06-17 ENCOUNTER — ROUTINE PRENATAL (OUTPATIENT)
Dept: OBGYN CLINIC | Facility: CLINIC | Age: 30
End: 2020-06-17
Payer: MEDICAID

## 2020-06-17 ENCOUNTER — TELEPHONE (OUTPATIENT)
Dept: OBGYN CLINIC | Facility: CLINIC | Age: 30
End: 2020-06-17

## 2020-06-17 VITALS
DIASTOLIC BLOOD PRESSURE: 76 MMHG | HEART RATE: 93 BPM | WEIGHT: 157 LBS | SYSTOLIC BLOOD PRESSURE: 117 MMHG | BODY MASS INDEX: 28 KG/M2

## 2020-06-17 DIAGNOSIS — Z34.92 ENCOUNTER FOR SUPERVISION OF NORMAL PREGNANCY IN SECOND TRIMESTER, UNSPECIFIED GRAVIDITY: Primary | ICD-10-CM

## 2020-06-17 DIAGNOSIS — Z20.821 ZIKA VIRUS EXPOSURE: Primary | ICD-10-CM

## 2020-06-17 PROCEDURE — 0502F SUBSEQUENT PRENATAL CARE: CPT | Performed by: OBSTETRICS & GYNECOLOGY

## 2020-06-17 PROCEDURE — 81002 URINALYSIS NONAUTO W/O SCOPE: CPT | Performed by: OBSTETRICS & GYNECOLOGY

## 2020-06-22 NOTE — TELEPHONE ENCOUNTER
LEVEL II ULTRASOUND APPROVED. Stefanie Pavon #[id number]. MESSAGE SENT TO [facility]. PT AND  NOTIFIED.

## 2020-06-24 NOTE — PROGRESS NOTES
Outpatient Maternal-Fetal Medicine Consultation    Dear Dr. Gil Shaikh    Thank you for requesting ultrasound evaluation and maternal fetal medicine consultation on your patient Masha Swenson.   As you are aware she is a 27year old female [id number] with a si CONTOUR MONITOR) Does not apply Device, 1 kit by Finger stick route 4 (four) times daily. (Patient not taking: Reported on 4/8/2020 ), Disp: 1 Device, Rfl: 0  •  PRENATAL 27-0.8 MG Oral Tab, Take 1 tablet by mouth daily. , Disp: , Rfl:   Allergies: No Known laboratory evidence of possible Zika infection, use of ultrasonography to evaluate for fetal abnormalities consistent with congenital Zika virus syndrome is recommended.  Ultrasonographic examinations can be used to assess fetal anatomy, particularly neuroa symptom onset or last possible Zika virus exposure to attempt pregnancy.   · Pregnant women should avoid travel to areas where current [place] virus outbreaks are occurring because of the potential for major birth defects and developmental abnormalities among providers.   · Obstetrician–gynecologists and other health care providers should  patients that although Rajinder Willingham has been detected in breast milk, based on the available data, the Centers for Disease Control and Prevention (CDC) and the [address]

## 2020-06-25 ENCOUNTER — HOSPITAL ENCOUNTER (OUTPATIENT)
Dept: PERINATAL CARE | Facility: HOSPITAL | Age: 30
Discharge: HOME | End: 2020-06-25
Attending: OBSTETRICS & GYNECOLOGY
Payer: MEDICAID

## 2020-06-25 VITALS
WEIGHT: 157 LBS | HEIGHT: 63 IN | SYSTOLIC BLOOD PRESSURE: 112 MMHG | DIASTOLIC BLOOD PRESSURE: 73 MMHG | HEART RATE: 103 BPM | BODY MASS INDEX: 27.82 KG/M2

## 2020-06-25 DIAGNOSIS — Z86.32 HISTORY OF GESTATIONAL DIABETES IN PRIOR PREGNANCY, CURRENTLY PREGNANT: ICD-10-CM

## 2020-06-25 DIAGNOSIS — O09.299 HISTORY OF GESTATIONAL DIABETES IN PRIOR PREGNANCY, CURRENTLY PREGNANT: ICD-10-CM

## 2020-06-25 DIAGNOSIS — O99.891 ZIKA VIRUS EXPOSURE AFFECTING PREGNANCY: ICD-10-CM

## 2020-06-25 DIAGNOSIS — O99.891 ZIKA VIRUS EXPOSURE AFFECTING PREGNANCY: Primary | ICD-10-CM

## 2020-06-25 DIAGNOSIS — Z20.821 ZIKA VIRUS EXPOSURE AFFECTING PREGNANCY: ICD-10-CM

## 2020-06-25 DIAGNOSIS — Z20.821 ZIKA VIRUS EXPOSURE AFFECTING PREGNANCY: Primary | ICD-10-CM

## 2020-06-25 PROCEDURE — 99215 OFFICE O/P EST HI 40 MIN: CPT | Performed by: OBSTETRICS & GYNECOLOGY

## 2020-06-25 PROCEDURE — 76811 OB US DETAILED SNGL FETUS: CPT | Performed by: OBSTETRICS & GYNECOLOGY

## 2020-07-15 ENCOUNTER — ROUTINE PRENATAL (OUTPATIENT)
Dept: OBGYN CLINIC | Facility: CLINIC | Age: 30
End: 2020-07-15
Payer: MEDICAID

## 2020-07-15 VITALS
HEART RATE: 90 BPM | BODY MASS INDEX: 28 KG/M2 | WEIGHT: 159 LBS | DIASTOLIC BLOOD PRESSURE: 75 MMHG | SYSTOLIC BLOOD PRESSURE: 116 MMHG

## 2020-07-15 DIAGNOSIS — Z34.82 ENCOUNTER FOR SUPERVISION OF OTHER NORMAL PREGNANCY IN SECOND TRIMESTER: Primary | ICD-10-CM

## 2020-07-15 LAB
APPEARANCE: CLEAR
MULTISTIX LOT#: 1044 NUMERIC
URINE-COLOR: YELLOW

## 2020-07-15 PROCEDURE — 0502F SUBSEQUENT PRENATAL CARE: CPT | Performed by: OBSTETRICS & GYNECOLOGY

## 2020-07-15 PROCEDURE — 81002 URINALYSIS NONAUTO W/O SCOPE: CPT | Performed by: OBSTETRICS & GYNECOLOGY

## 2020-07-20 ENCOUNTER — APPOINTMENT (OUTPATIENT)
Dept: LAB | Age: 30
End: 2020-07-20
Attending: OBSTETRICS & GYNECOLOGY
Payer: MEDICAID

## 2020-07-20 DIAGNOSIS — Z34.82 ENCOUNTER FOR SUPERVISION OF OTHER NORMAL PREGNANCY IN SECOND TRIMESTER: ICD-10-CM

## 2020-07-20 LAB
DEPRECATED RDW RBC AUTO: 44.9 FL (ref 35.1–46.3)
ERYTHROCYTE [DISTWIDTH] IN BLOOD BY AUTOMATED COUNT: 13.6 % (ref 11–15)
GLUCOSE 1H P GLC SERPL-MCNC: 142 MG/DL
HCT VFR BLD AUTO: 37.2 % (ref 35–48)
HGB BLD-MCNC: 12.7 G/DL (ref 12–16)
MCH RBC QN AUTO: 31.3 PG (ref 26–34)
MCHC RBC AUTO-ENTMCNC: 34.1 G/DL (ref 31–37)
MCV RBC AUTO: 91.6 FL (ref 80–100)
PLATELET # BLD AUTO: 270 10(3)UL (ref 150–450)
RBC # BLD AUTO: 4.06 X10(6)UL (ref 3.8–5.3)
WBC # BLD AUTO: 11.2 X10(3) UL (ref 4–11)

## 2020-07-20 PROCEDURE — 36415 COLL VENOUS BLD VENIPUNCTURE: CPT

## 2020-07-20 PROCEDURE — 82950 GLUCOSE TEST: CPT

## 2020-07-20 PROCEDURE — 85027 COMPLETE CBC AUTOMATED: CPT

## 2020-07-27 ENCOUNTER — TELEPHONE (OUTPATIENT)
Dept: OBGYN CLINIC | Facility: CLINIC | Age: 30
End: 2020-07-27

## 2020-07-27 DIAGNOSIS — R73.9 BLOOD GLUCOSE ELEVATED: Primary | ICD-10-CM

## 2020-07-27 NOTE — TELEPHONE ENCOUNTER
Used language line to call pt ( ID #[id number]). Pt informed of CAPs recs and verbalized understanding. Order placed for 3 hour gtt. Pt informed she will need to call CS to set up appt and will need to fast 12 hours prior to appt.

## 2020-07-29 ENCOUNTER — APPOINTMENT (OUTPATIENT)
Dept: LAB | Age: 30
End: 2020-07-29
Attending: OBSTETRICS & GYNECOLOGY
Payer: MEDICAID

## 2020-07-29 ENCOUNTER — LABORATORY ENCOUNTER (OUTPATIENT)
Dept: LAB | Age: 30
End: 2020-07-29
Attending: OBSTETRICS & GYNECOLOGY
Payer: MEDICAID

## 2020-07-29 DIAGNOSIS — R73.9 BLOOD GLUCOSE ELEVATED: ICD-10-CM

## 2020-07-29 LAB
1 HR GLUCOSE GESTATIONAL: 188 MG/DL
GLUCOSE 1H P GLC SERPL-MCNC: 135 MG/DL
GLUCOSE 3H P GLC SERPL-MCNC: 127 MG/DL
GLUCOSE P FAST SERPL-MCNC: 81 MG/DL

## 2020-07-29 PROCEDURE — 36415 COLL VENOUS BLD VENIPUNCTURE: CPT

## 2020-07-29 PROCEDURE — 82952 GTT-ADDED SAMPLES: CPT

## 2020-07-29 PROCEDURE — 82951 GLUCOSE TOLERANCE TEST (GTT): CPT

## 2020-08-12 ENCOUNTER — ROUTINE PRENATAL (OUTPATIENT)
Dept: OBGYN CLINIC | Facility: CLINIC | Age: 30
End: 2020-08-12
Payer: MEDICAID

## 2020-08-12 VITALS
DIASTOLIC BLOOD PRESSURE: 71 MMHG | BODY MASS INDEX: 29 KG/M2 | HEART RATE: 85 BPM | SYSTOLIC BLOOD PRESSURE: 108 MMHG | WEIGHT: 161 LBS

## 2020-08-12 DIAGNOSIS — Z34.83 ENCOUNTER FOR SUPERVISION OF OTHER NORMAL PREGNANCY IN THIRD TRIMESTER: Primary | ICD-10-CM

## 2020-08-12 LAB
APPEARANCE: CLEAR
MULTISTIX LOT#: 1044 NUMERIC
URINE-COLOR: YELLOW

## 2020-08-12 PROCEDURE — 81002 URINALYSIS NONAUTO W/O SCOPE: CPT | Performed by: OBSTETRICS & GYNECOLOGY

## 2020-08-12 PROCEDURE — 0502F SUBSEQUENT PRENATAL CARE: CPT | Performed by: OBSTETRICS & GYNECOLOGY

## 2020-08-12 PROCEDURE — 3074F SYST BP LT 130 MM HG: CPT | Performed by: OBSTETRICS & GYNECOLOGY

## 2020-08-12 PROCEDURE — 3078F DIAST BP <80 MM HG: CPT | Performed by: OBSTETRICS & GYNECOLOGY

## 2020-08-17 NOTE — PROGRESS NOTES
Girl. No S/S of PTL. C/O right rib pain. Exam confirms acute TT changes at T8-10 right. HVLA with good response.

## 2020-08-26 ENCOUNTER — ROUTINE PRENATAL (OUTPATIENT)
Dept: OBGYN CLINIC | Facility: CLINIC | Age: 30
End: 2020-08-26
Payer: MEDICAID

## 2020-08-26 VITALS
DIASTOLIC BLOOD PRESSURE: 66 MMHG | WEIGHT: 164.19 LBS | HEART RATE: 83 BPM | BODY MASS INDEX: 29 KG/M2 | SYSTOLIC BLOOD PRESSURE: 111 MMHG

## 2020-08-26 DIAGNOSIS — Z34.93 ENCOUNTER FOR SUPERVISION OF NORMAL PREGNANCY IN THIRD TRIMESTER, UNSPECIFIED GRAVIDITY: Primary | ICD-10-CM

## 2020-08-26 LAB
APPEARANCE: CLEAR
MULTISTIX LOT#: 1044 NUMERIC
PH, URINE: 7.5 (ref 4.5–8)
SPECIFIC GRAVITY: 1.01 (ref 1–1.03)
URINE-COLOR: YELLOW

## 2020-08-26 PROCEDURE — 3078F DIAST BP <80 MM HG: CPT | Performed by: OBSTETRICS & GYNECOLOGY

## 2020-08-26 PROCEDURE — 3074F SYST BP LT 130 MM HG: CPT | Performed by: OBSTETRICS & GYNECOLOGY

## 2020-08-26 PROCEDURE — 0502F SUBSEQUENT PRENATAL CARE: CPT | Performed by: OBSTETRICS & GYNECOLOGY

## 2020-08-26 PROCEDURE — 81002 URINALYSIS NONAUTO W/O SCOPE: CPT | Performed by: OBSTETRICS & GYNECOLOGY

## 2020-09-09 ENCOUNTER — HOSPITAL ENCOUNTER (OUTPATIENT)
Dept: PERINATAL CARE | Facility: HOSPITAL | Age: 30
Discharge: HOME | End: 2020-09-09
Attending: OBSTETRICS & GYNECOLOGY
Payer: MEDICAID

## 2020-09-09 VITALS
HEART RATE: 87 BPM | DIASTOLIC BLOOD PRESSURE: 75 MMHG | WEIGHT: 167 LBS | SYSTOLIC BLOOD PRESSURE: 111 MMHG | BODY MASS INDEX: 30 KG/M2

## 2020-09-09 DIAGNOSIS — Z20.821 ZIKA VIRUS EXPOSURE AFFECTING PREGNANCY: Primary | ICD-10-CM

## 2020-09-09 DIAGNOSIS — O09.299 HISTORY OF GESTATIONAL DIABETES IN PRIOR PREGNANCY, CURRENTLY PREGNANT: ICD-10-CM

## 2020-09-09 DIAGNOSIS — O99.891 ZIKA VIRUS EXPOSURE AFFECTING PREGNANCY: Primary | ICD-10-CM

## 2020-09-09 DIAGNOSIS — Z20.821 ZIKA VIRUS EXPOSURE AFFECTING PREGNANCY: ICD-10-CM

## 2020-09-09 DIAGNOSIS — Z86.32 HISTORY OF GESTATIONAL DIABETES IN PRIOR PREGNANCY, CURRENTLY PREGNANT: ICD-10-CM

## 2020-09-09 DIAGNOSIS — O99.891 ZIKA VIRUS EXPOSURE AFFECTING PREGNANCY: ICD-10-CM

## 2020-09-09 PROCEDURE — 76819 FETAL BIOPHYS PROFIL W/O NST: CPT | Performed by: OBSTETRICS & GYNECOLOGY

## 2020-09-09 PROCEDURE — 76816 OB US FOLLOW-UP PER FETUS: CPT | Performed by: OBSTETRICS & GYNECOLOGY

## 2020-09-09 PROCEDURE — 99213 OFFICE O/P EST LOW 20 MIN: CPT | Performed by: OBSTETRICS & GYNECOLOGY

## 2020-09-09 NOTE — PROGRESS NOTES
Outpatient Maternal-Fetal Medicine Consultation    Dear Dr. Bel Miranda    Thank you for requesting ultrasound evaluation and maternal fetal medicine consultation on your patient Migdalia Mark.   As you are aware she is a 27year old female [id number] with a si CONTOUR MONITOR) Does not apply Device, 1 kit by Finger stick route 4 (four) times daily. (Patient not taking: Reported on 8/26/2020 ), Disp: 1 Device, Rfl: 0  •  PRENATAL 27-0.8 MG Oral Tab, Take 1 tablet by mouth daily. , Disp: , Rfl:   Allergies: No Know

## 2020-09-11 ENCOUNTER — ROUTINE PRENATAL (OUTPATIENT)
Dept: OBGYN CLINIC | Facility: CLINIC | Age: 30
End: 2020-09-11
Payer: MEDICAID

## 2020-09-11 VITALS
WEIGHT: 166.81 LBS | BODY MASS INDEX: 30 KG/M2 | DIASTOLIC BLOOD PRESSURE: 69 MMHG | HEART RATE: 96 BPM | SYSTOLIC BLOOD PRESSURE: 103 MMHG

## 2020-09-11 DIAGNOSIS — Z34.93 ENCOUNTER FOR SUPERVISION OF NORMAL PREGNANCY IN THIRD TRIMESTER, UNSPECIFIED GRAVIDITY: Primary | ICD-10-CM

## 2020-09-11 LAB
MULTISTIX LOT#: 1044 NUMERIC
PH, URINE: 6.5 (ref 4.5–8)
SPECIFIC GRAVITY: 1.01 (ref 1–1.03)
UROBILINOGEN,SEMI-QN: 0 MG/DL (ref 0–1.9)

## 2020-09-11 PROCEDURE — 3078F DIAST BP <80 MM HG: CPT | Performed by: OBSTETRICS & GYNECOLOGY

## 2020-09-11 PROCEDURE — 81002 URINALYSIS NONAUTO W/O SCOPE: CPT | Performed by: OBSTETRICS & GYNECOLOGY

## 2020-09-11 PROCEDURE — 3074F SYST BP LT 130 MM HG: CPT | Performed by: OBSTETRICS & GYNECOLOGY

## 2020-09-11 PROCEDURE — 0502F SUBSEQUENT PRENATAL CARE: CPT | Performed by: OBSTETRICS & GYNECOLOGY

## 2020-09-25 ENCOUNTER — ROUTINE PRENATAL (OUTPATIENT)
Dept: OBGYN CLINIC | Facility: CLINIC | Age: 30
End: 2020-09-25
Payer: MEDICAID

## 2020-09-25 VITALS
SYSTOLIC BLOOD PRESSURE: 110 MMHG | HEART RATE: 82 BPM | WEIGHT: 170.63 LBS | DIASTOLIC BLOOD PRESSURE: 73 MMHG | BODY MASS INDEX: 30 KG/M2

## 2020-09-25 DIAGNOSIS — Z34.83 ENCOUNTER FOR SUPERVISION OF OTHER NORMAL PREGNANCY IN THIRD TRIMESTER: Primary | ICD-10-CM

## 2020-09-25 LAB
MULTISTIX EXPIRATION DATE: NORMAL DATE
MULTISTIX LOT#: 1044 NUMERIC
PH, URINE: 7 (ref 4.5–8)
SPECIFIC GRAVITY: 1.01 (ref 1–1.03)
UROBILINOGEN,SEMI-QN: 0.2 MG/DL (ref 0–1.9)

## 2020-09-25 PROCEDURE — 3074F SYST BP LT 130 MM HG: CPT | Performed by: OBSTETRICS & GYNECOLOGY

## 2020-09-25 PROCEDURE — 90471 IMMUNIZATION ADMIN: CPT | Performed by: OBSTETRICS & GYNECOLOGY

## 2020-09-25 PROCEDURE — 90686 IIV4 VACC NO PRSV 0.5 ML IM: CPT | Performed by: OBSTETRICS & GYNECOLOGY

## 2020-09-25 PROCEDURE — 81002 URINALYSIS NONAUTO W/O SCOPE: CPT | Performed by: OBSTETRICS & GYNECOLOGY

## 2020-09-25 PROCEDURE — 0502F SUBSEQUENT PRENATAL CARE: CPT | Performed by: OBSTETRICS & GYNECOLOGY

## 2020-09-25 PROCEDURE — 3078F DIAST BP <80 MM HG: CPT | Performed by: OBSTETRICS & GYNECOLOGY

## 2020-10-09 ENCOUNTER — ROUTINE PRENATAL (OUTPATIENT)
Dept: OBGYN CLINIC | Facility: CLINIC | Age: 30
End: 2020-10-09
Payer: MEDICAID

## 2020-10-09 ENCOUNTER — LAB ENCOUNTER (OUTPATIENT)
Dept: LAB | Facility: HOSPITAL | Age: 30
End: 2020-10-09
Attending: OBSTETRICS & GYNECOLOGY
Payer: MEDICAID

## 2020-10-09 VITALS
BODY MASS INDEX: 29 KG/M2 | HEART RATE: 66 BPM | WEIGHT: 165.63 LBS | DIASTOLIC BLOOD PRESSURE: 85 MMHG | SYSTOLIC BLOOD PRESSURE: 124 MMHG

## 2020-10-09 DIAGNOSIS — Z34.92 ENCOUNTER FOR SUPERVISION OF NORMAL PREGNANCY IN SECOND TRIMESTER, UNSPECIFIED GRAVIDITY: Primary | ICD-10-CM

## 2020-10-09 DIAGNOSIS — Z34.83 ENCOUNTER FOR SUPERVISION OF OTHER NORMAL PREGNANCY IN THIRD TRIMESTER: ICD-10-CM

## 2020-10-09 PROCEDURE — 36415 COLL VENOUS BLD VENIPUNCTURE: CPT

## 2020-10-09 PROCEDURE — 3074F SYST BP LT 130 MM HG: CPT | Performed by: OBSTETRICS & GYNECOLOGY

## 2020-10-09 PROCEDURE — 85027 COMPLETE CBC AUTOMATED: CPT

## 2020-10-09 PROCEDURE — 87389 HIV-1 AG W/HIV-1&-2 AB AG IA: CPT

## 2020-10-09 PROCEDURE — 0502F SUBSEQUENT PRENATAL CARE: CPT | Performed by: OBSTETRICS & GYNECOLOGY

## 2020-10-09 PROCEDURE — 3079F DIAST BP 80-89 MM HG: CPT | Performed by: OBSTETRICS & GYNECOLOGY

## 2020-10-09 PROCEDURE — 81002 URINALYSIS NONAUTO W/O SCOPE: CPT | Performed by: OBSTETRICS & GYNECOLOGY

## 2020-10-09 PROCEDURE — 86780 TREPONEMA PALLIDUM: CPT

## 2020-10-10 NOTE — PATIENT INSTRUCTIONS
Please do blood and urine tests    Call me after   Do last week of July    See me again if needed    Watch sugars    Nice to meet you both
8

## 2020-10-13 ENCOUNTER — ROUTINE PRENATAL (OUTPATIENT)
Dept: OBGYN CLINIC | Facility: CLINIC | Age: 30
End: 2020-10-13
Payer: MEDICAID

## 2020-10-13 VITALS
WEIGHT: 169.81 LBS | DIASTOLIC BLOOD PRESSURE: 79 MMHG | BODY MASS INDEX: 30 KG/M2 | SYSTOLIC BLOOD PRESSURE: 120 MMHG | HEART RATE: 74 BPM

## 2020-10-13 DIAGNOSIS — Z34.93 ENCOUNTER FOR SUPERVISION OF NORMAL PREGNANCY IN THIRD TRIMESTER, UNSPECIFIED GRAVIDITY: Primary | ICD-10-CM

## 2020-10-13 PROCEDURE — 81002 URINALYSIS NONAUTO W/O SCOPE: CPT | Performed by: OBSTETRICS & GYNECOLOGY

## 2020-10-13 PROCEDURE — 3078F DIAST BP <80 MM HG: CPT | Performed by: OBSTETRICS & GYNECOLOGY

## 2020-10-13 PROCEDURE — 3074F SYST BP LT 130 MM HG: CPT | Performed by: OBSTETRICS & GYNECOLOGY

## 2020-10-13 PROCEDURE — 0502F SUBSEQUENT PRENATAL CARE: CPT | Performed by: OBSTETRICS & GYNECOLOGY

## 2020-10-19 ENCOUNTER — ROUTINE PRENATAL (OUTPATIENT)
Dept: OBGYN CLINIC | Facility: CLINIC | Age: 30
End: 2020-10-19
Payer: MEDICAID

## 2020-10-19 VITALS
BODY MASS INDEX: 30 KG/M2 | HEART RATE: 71 BPM | WEIGHT: 171.63 LBS | SYSTOLIC BLOOD PRESSURE: 122 MMHG | DIASTOLIC BLOOD PRESSURE: 84 MMHG

## 2020-10-19 DIAGNOSIS — Z34.93 ENCOUNTER FOR SUPERVISION OF NORMAL PREGNANCY IN THIRD TRIMESTER, UNSPECIFIED GRAVIDITY: Primary | ICD-10-CM

## 2020-10-19 PROCEDURE — 81002 URINALYSIS NONAUTO W/O SCOPE: CPT | Performed by: OBSTETRICS & GYNECOLOGY

## 2020-10-19 PROCEDURE — 3079F DIAST BP 80-89 MM HG: CPT | Performed by: OBSTETRICS & GYNECOLOGY

## 2020-10-19 PROCEDURE — 3074F SYST BP LT 130 MM HG: CPT | Performed by: OBSTETRICS & GYNECOLOGY

## 2020-10-19 PROCEDURE — 0502F SUBSEQUENT PRENATAL CARE: CPT | Performed by: OBSTETRICS & GYNECOLOGY

## 2020-10-25 ENCOUNTER — HOSPITAL ENCOUNTER (EMERGENCY)
Facility: HOSPITAL | Age: 30
Discharge: HOME | End: 2020-10-25
Attending: EMERGENCY MEDICINE
Payer: MEDICAID

## 2020-10-25 VITALS
TEMPERATURE: 100 F | RESPIRATION RATE: 18 BRPM | OXYGEN SATURATION: 96 % | HEART RATE: 95 BPM | WEIGHT: 168 LBS | DIASTOLIC BLOOD PRESSURE: 82 MMHG | SYSTOLIC BLOOD PRESSURE: 123 MMHG | BODY MASS INDEX: 28.68 KG/M2 | HEIGHT: 64 IN

## 2020-10-25 DIAGNOSIS — J02.0 ACUTE STREPTOCOCCAL PHARYNGITIS: Primary | ICD-10-CM

## 2020-10-25 PROCEDURE — 93010 ELECTROCARDIOGRAM REPORT: CPT | Performed by: EMERGENCY MEDICINE

## 2020-10-25 PROCEDURE — 93005 ELECTROCARDIOGRAM TRACING: CPT

## 2020-10-25 PROCEDURE — 87430 STREP A AG IA: CPT

## 2020-10-25 PROCEDURE — 99284 EMERGENCY DEPT VISIT MOD MDM: CPT

## 2020-10-25 RX ORDER — ACETAMINOPHEN 325 MG/1
650 TABLET ORAL ONCE
Status: COMPLETED | OUTPATIENT
Start: 2020-10-25 | End: 2020-10-25

## 2020-10-25 RX ORDER — AMOXICILLIN 500 MG/1
500 TABLET, FILM COATED ORAL 2 TIMES DAILY
Qty: 20 TABLET | Refills: 0 | Status: SHIPPED | OUTPATIENT
Start: 2020-10-25 | End: 2020-11-04

## 2020-10-25 NOTE — ED INITIAL ASSESSMENT (HPI)
Pt is 38 weeks pregnant. States she is feeling fever/chills and body aches. Denies chest pain. She denies abdominal pain/cramping, no vaginal bleeding or discharge. Last felt baby move less than 30 minutes ago.  Son recently diagnosed with strep throat and

## 2020-10-25 NOTE — ED PROVIDER NOTES
Patient Seen in: [facility] AND [facility] Emergency Department      History   Patient presents with: Body ache and/or chills    Stated Complaint: Body aches, Chest pain.      HPI    51-year-old female presents for complaint of body aches, sore throat, earache, (Temporal)   Resp 18   Ht 162.6 cm (5' 4\")   Wt 76.2 kg   LMP 02/15/2020 (Within Days)   SpO2 96%   BMI 28.84 kg/m²         Physical Exam  Vitals signs and nursing note reviewed. Constitutional:       Appearance: She is well-developed.    HENT:      Head and axes as noted on EKG Report. Rate: 105  Rhythm: Sinus Rhythm  Reading: no stemi, interpreted by ed physician. MDM    EKG sinus tachycardia. Exam consistent with strep, rapid strep was positive.   She does have a bit of a cough as

## 2020-10-25 NOTE — ED NOTES
Pt is  38 wks gestation. States has had body aches and headache, worse with coughing today, started with runny nose and congestion last night. Denies fevers, n/v/d, SOB, abd cramping, vaginal discharge or bleeding. Fetal movement right now.  States Preg

## 2020-10-27 ENCOUNTER — TELEPHONE (OUTPATIENT)
Dept: OBGYN CLINIC | Facility: CLINIC | Age: 30
End: 2020-10-27

## 2020-10-27 NOTE — TELEPHONE ENCOUNTER
Patient was in the ER this past weekend with suspected Strep. Test came back positive for Covid and Strep. Please advise on best care, what she needs to do moving forward and which appointments she needs to reschedule.

## 2020-10-27 NOTE — TELEPHONE ENCOUNTER
Per SOSA, ok to add to the end of his Thur schedule at 7pm.  Pt's  aware of change in time for appt. Advised to park in purple lot, call [phone] and that pt will be brought up the stairwell for the appt by the roomer.

## 2020-10-27 NOTE — TELEPHONE ENCOUNTER
Patient needs to be seen at end of schedule. Need to make Marva Rojo aware so proper protocols are followed.

## 2020-10-27 NOTE — TELEPHONE ENCOUNTER
called and consent signed to talk to . Prenatal pt speaks Welsh  states. Prenatal pt is 38w6d. Next appt is 10-29-20 with KCHENRI. Pt went to ER on 10-25-20, Amos. Pt tested positive for strep and covid.  Pt was prescribed rx fo

## 2020-10-28 ENCOUNTER — TELEPHONE (OUTPATIENT)
Dept: OBGYN CLINIC | Facility: CLINIC | Age: 30
End: 2020-10-28

## 2020-10-28 ENCOUNTER — HOSPITAL ENCOUNTER (INPATIENT)
Facility: HOSPITAL | Age: 30
LOS: 2 days | Discharge: HOME | End: 2020-10-30
Attending: OBSTETRICS & GYNECOLOGY | Admitting: OBSTETRICS & GYNECOLOGY
Payer: MEDICAID

## 2020-10-28 PROBLEM — Z34.90 PREGNANT (HCC): Status: ACTIVE | Noted: 2020-10-28

## 2020-10-28 PROBLEM — Z34.90 PREGNANCY: Status: ACTIVE | Noted: 2020-10-28

## 2020-10-28 PROBLEM — Z34.90 PREGNANT: Status: ACTIVE | Noted: 2020-10-28

## 2020-10-28 PROBLEM — Z34.90 PREGNANCY (HCC): Status: ACTIVE | Noted: 2020-10-28

## 2020-10-28 PROCEDURE — 59409 OBSTETRICAL CARE: CPT | Performed by: OBSTETRICS & GYNECOLOGY

## 2020-10-28 RX ORDER — OXYTOCIN 10 [USP'U]/ML
INJECTION, SOLUTION INTRAMUSCULAR; INTRAVENOUS
Status: COMPLETED
Start: 2020-10-28 | End: 2020-10-28

## 2020-10-28 RX ORDER — CEFAZOLIN SODIUM/WATER 2 G/20 ML
2 SYRINGE (ML) INTRAVENOUS EVERY 8 HOURS
Status: DISCONTINUED | OUTPATIENT
Start: 2020-10-28 | End: 2020-10-28

## 2020-10-28 RX ORDER — ACETAMINOPHEN 500 MG
500 TABLET ORAL EVERY 6 HOURS PRN
Status: DISCONTINUED | OUTPATIENT
Start: 2020-10-28 | End: 2020-10-28 | Stop reason: HOSPADM

## 2020-10-28 RX ORDER — DEXTROSE, SODIUM CHLORIDE, SODIUM LACTATE, POTASSIUM CHLORIDE, AND CALCIUM CHLORIDE 5; .6; .31; .03; .02 G/100ML; G/100ML; G/100ML; G/100ML; G/100ML
INJECTION, SOLUTION INTRAVENOUS CONTINUOUS
Status: DISCONTINUED | OUTPATIENT
Start: 2020-10-28 | End: 2020-10-28 | Stop reason: HOSPADM

## 2020-10-28 RX ORDER — CEFAZOLIN SODIUM/WATER 2 G/20 ML
2 SYRINGE (ML) INTRAVENOUS ONCE
Status: DISCONTINUED | OUTPATIENT
Start: 2020-10-28 | End: 2020-10-28

## 2020-10-28 RX ORDER — TRISODIUM CITRATE DIHYDRATE AND CITRIC ACID MONOHYDRATE 500; 334 MG/5ML; MG/5ML
30 SOLUTION ORAL AS NEEDED
Status: DISCONTINUED | OUTPATIENT
Start: 2020-10-28 | End: 2020-10-28 | Stop reason: HOSPADM

## 2020-10-28 RX ORDER — TERBUTALINE SULFATE 1 MG/ML
0.25 INJECTION, SOLUTION SUBCUTANEOUS AS NEEDED
Status: DISCONTINUED | OUTPATIENT
Start: 2020-10-28 | End: 2020-10-28 | Stop reason: HOSPADM

## 2020-10-28 RX ORDER — DOCUSATE SODIUM 100 MG/1
100 CAPSULE, LIQUID FILLED ORAL
Status: DISCONTINUED | OUTPATIENT
Start: 2020-10-28 | End: 2020-10-30

## 2020-10-28 RX ORDER — LIDOCAINE HYDROCHLORIDE 10 MG/ML
INJECTION, SOLUTION EPIDURAL; INFILTRATION; INTRACAUDAL; PERINEURAL
Status: COMPLETED
Start: 2020-10-28 | End: 2020-10-28

## 2020-10-28 RX ORDER — AMOXICILLIN 500 MG/1
500 CAPSULE ORAL EVERY 12 HOURS SCHEDULED
Status: DISCONTINUED | OUTPATIENT
Start: 2020-10-28 | End: 2020-10-30

## 2020-10-28 RX ORDER — AMMONIA INHALANTS 0.04 G/.3ML
0.3 INHALANT RESPIRATORY (INHALATION) AS NEEDED
Status: DISCONTINUED | OUTPATIENT
Start: 2020-10-28 | End: 2020-10-28 | Stop reason: HOSPADM

## 2020-10-28 RX ORDER — IBUPROFEN 600 MG/1
600 TABLET ORAL EVERY 6 HOURS PRN
Status: DISCONTINUED | OUTPATIENT
Start: 2020-10-28 | End: 2020-10-28 | Stop reason: HOSPADM

## 2020-10-28 RX ORDER — LIDOCAINE HYDROCHLORIDE 10 MG/ML
30 INJECTION, SOLUTION EPIDURAL; INFILTRATION; INTRACAUDAL; PERINEURAL ONCE
Status: COMPLETED | OUTPATIENT
Start: 2020-10-28 | End: 2020-10-28

## 2020-10-28 RX ORDER — ONDANSETRON 2 MG/ML
4 INJECTION INTRAMUSCULAR; INTRAVENOUS EVERY 6 HOURS PRN
Status: DISCONTINUED | OUTPATIENT
Start: 2020-10-28 | End: 2020-10-30

## 2020-10-28 RX ORDER — BISACODYL 10 MG
10 SUPPOSITORY, RECTAL RECTAL ONCE AS NEEDED
Status: DISCONTINUED | OUTPATIENT
Start: 2020-10-28 | End: 2020-10-30

## 2020-10-28 RX ORDER — SODIUM CHLORIDE, SODIUM LACTATE, POTASSIUM CHLORIDE, CALCIUM CHLORIDE 600; 310; 30; 20 MG/100ML; MG/100ML; MG/100ML; MG/100ML
INJECTION, SOLUTION INTRAVENOUS AS NEEDED
Status: DISCONTINUED | OUTPATIENT
Start: 2020-10-28 | End: 2020-10-28 | Stop reason: HOSPADM

## 2020-10-28 RX ORDER — ONDANSETRON 2 MG/ML
4 INJECTION INTRAMUSCULAR; INTRAVENOUS EVERY 6 HOURS PRN
Status: DISCONTINUED | OUTPATIENT
Start: 2020-10-28 | End: 2020-10-28 | Stop reason: HOSPADM

## 2020-10-28 RX ORDER — AMOXICILLIN 500 MG/1
500 TABLET, FILM COATED ORAL 2 TIMES DAILY
Status: DISCONTINUED | OUTPATIENT
Start: 2020-10-28 | End: 2020-10-28

## 2020-10-28 RX ORDER — ACETAMINOPHEN 325 MG/1
650 TABLET ORAL EVERY 6 HOURS PRN
Status: DISCONTINUED | OUTPATIENT
Start: 2020-10-28 | End: 2020-10-29

## 2020-10-28 RX ORDER — AMMONIA INHALANTS 0.04 G/.3ML
0.3 INHALANT RESPIRATORY (INHALATION) AS NEEDED
Status: DISCONTINUED | OUTPATIENT
Start: 2020-10-28 | End: 2020-10-30

## 2020-10-28 RX ORDER — CEFAZOLIN SODIUM/WATER 2 G/20 ML
SYRINGE (ML) INTRAVENOUS
Status: DISPENSED
Start: 2020-10-28 | End: 2020-10-28

## 2020-10-28 RX ORDER — SIMETHICONE 80 MG
80 TABLET,CHEWABLE ORAL 3 TIMES DAILY PRN
Status: DISCONTINUED | OUTPATIENT
Start: 2020-10-28 | End: 2020-10-30

## 2020-10-28 RX ORDER — ENOXAPARIN SODIUM 100 MG/ML
40 INJECTION SUBCUTANEOUS EVERY 24 HOURS
Status: DISCONTINUED | OUTPATIENT
Start: 2020-10-28 | End: 2020-10-30

## 2020-10-28 RX ORDER — DIAPER,BRIEF,INFANT-TODD,DISP
1 EACH MISCELLANEOUS EVERY 6 HOURS PRN
Status: DISCONTINUED | OUTPATIENT
Start: 2020-10-28 | End: 2020-10-30

## 2020-10-28 NOTE — PROGRESS NOTES
Patient up to bathroom with assist x 1. Voided  500 ml Patient transferred to mother/baby [id number] per wheelchair in stable condition with baby and personal belongings. Accompanied by significant other and staff.   Report given to mother/baby RNAkil

## 2020-10-28 NOTE — TELEPHONE ENCOUNTER
39w0d Pt  states pt contractions q 5 minutes and \"worse. \" Pt  states pt water just broke in the car. Pt  states they will arrive in about 10 minutes. Informed pt  to go to [facility] and to inform upon arrival pt is Cvoid positive.  P

## 2020-10-28 NOTE — PROGRESS NOTES
This RN arrived to the room at 0935 to admit and evaluate the patient. Pt is Covid positive. Pt in the bed complaining of contractions and rectal pressure. Stooling and moaning. SVE confirmed pt to 10 cm dialated. Dr Cynthia Ramirez notified.  At 1172 pt pushed involun

## 2020-10-28 NOTE — DISCHARGE SUMMARY
[facility]    Discharge Summary    Charlette Blocker Patient Status:  Inpatient    1990 MRN [id number]   Location [address] [facility] Raina Cook MD   Hosp Day # 0       Admit date:  10/28/2020

## 2020-10-28 NOTE — L&D DELIVERY NOTE
[facility] HOSP - [facility]    Vaginal Delivery Note    Azul Gregory Patient Status:  Inpatient    1990 MRN [id number]   Location [address]  Attending Becky Clark MD   Hosp Day # 0 PCP Arron Boyd MD     D

## 2020-10-28 NOTE — H&P
[address] Enrique Cheatham Patient Status:  Inpatient    1990 MRN [id number]   Location [address] [facility] Attending Edith Simth MD   Hosp Day # 0 PCP Johanna Mack MD     Date decelerations   2 Term 12/18/15 39w0d  6 lb 12 oz (3.062 kg) F NORMAL SPONT  N SHOLA      Complications: Abnormal glucose complicating pregnancy, First degree perineal laceration during delivery, Other specified trauma to perineum and vulva, Full-term premat 10/28/2020    CREATSERUM 0.61 07/23/2019    BUN 10 07/23/2019     07/23/2019    K 4.0 07/23/2019     07/23/2019    CO2 22.0 07/23/2019    GLU 84 07/23/2019    CA 8.5 07/23/2019    ALB 3.8 07/13/2016    BILT 0.4 07/13/2016    TP 7.1 07/13/2016

## 2020-10-28 NOTE — TELEPHONE ENCOUNTER
5 min contractions   Water broke  Spoke with Dr Chivo Freitas this morning'  On their way to hospital

## 2020-10-28 NOTE — LACTATION NOTE
LACTATION NOTE - MOTHER      Evaluation Type: Inpatient    Problems identified  Problems identified: Knowledge deficit    Maternal history  Other/comment: covid positive    Breastfeeding goal  Breastfeeding goal: To maintain breast milk feeding per patient

## 2020-10-29 ENCOUNTER — APPOINTMENT (OUTPATIENT)
Dept: CT IMAGING | Facility: HOSPITAL | Age: 30
End: 2020-10-29
Attending: OBSTETRICS & GYNECOLOGY
Payer: MEDICAID

## 2020-10-29 ENCOUNTER — APPOINTMENT (OUTPATIENT)
Dept: GENERAL RADIOLOGY | Facility: HOSPITAL | Age: 30
End: 2020-10-29
Attending: OBSTETRICS & GYNECOLOGY
Payer: MEDICAID

## 2020-10-29 PROBLEM — U07.1 COVID-19 VIRUS INFECTION: Status: ACTIVE | Noted: 2020-10-29

## 2020-10-29 PROCEDURE — 71045 X-RAY EXAM CHEST 1 VIEW: CPT | Performed by: OBSTETRICS & GYNECOLOGY

## 2020-10-29 PROCEDURE — XW033E5 INTRODUCTION OF REMDESIVIR ANTI-INFECTIVE INTO PERIPHERAL VEIN, PERCUTANEOUS APPROACH, NEW TECHNOLOGY GROUP 5: ICD-10-PCS | Performed by: OBSTETRICS & GYNECOLOGY

## 2020-10-29 PROCEDURE — 71260 CT THORAX DX C+: CPT | Performed by: OBSTETRICS & GYNECOLOGY

## 2020-10-29 PROCEDURE — 0HQ9XZZ REPAIR PERINEUM SKIN, EXTERNAL APPROACH: ICD-10-PCS | Performed by: OBSTETRICS & GYNECOLOGY

## 2020-10-29 RX ORDER — ACETAMINOPHEN 500 MG
1000 TABLET ORAL EVERY 6 HOURS PRN
Status: DISCONTINUED | OUTPATIENT
Start: 2020-10-29 | End: 2020-10-30

## 2020-10-29 NOTE — PLAN OF CARE
Problem: POSTPARTUM  Goal: Long Term Goal:Experiences normal postpartum course  Description: INTERVENTIONS:  - Assess and monitor vital signs and lab values. - Assess fundus and lochia. - Provide ice/sitz baths for perineum discomfort.   - Monitor heali pain/trauma. - Instruct and provide assistance with proper latch. - Review techniques for milk expression (breast pumping) and storage of breast milk. Provide pumping equipment/supplies, instructions and assistance, as needed.   - Encourage rooming-in and assessment wnl, patient c/o dry cough. Temp 99.3, desires 24 hour discharge today. Will continue plan of care.

## 2020-10-29 NOTE — PROGRESS NOTES
Post-Partum Note   10/29/2020, 1:04 PM  Carlo on phone during exam.      Subjective:  Patient doing well. Abdominal pain mild she is complaining of chills / myalgias as well as chest / neck pain during coughing. Lochia is small.   She reports she does to

## 2020-10-29 NOTE — CM/SW NOTE
MDO:  Covid (+)  CM spoke via pt rm-phone to preserve PPE. Pt is Bahraini speaking and this writer speaks Bahraini fluently. Patient lives with spouse, her mother, and two children. She has a 10 y/o daughter and one year old son.     Per patient her 1

## 2020-10-29 NOTE — PLAN OF CARE
Problem: POSTPARTUM  Goal: Long Term Goal:Experiences normal postpartum course  Description: INTERVENTIONS:  - Assess and monitor vital signs and lab values. - Assess fundus and lochia. - Provide ice/sitz baths for perineum discomfort.   - Monitor heali medications or substances incompatible with breast feeding.  - Assess and monitor for signs of nipple pain/trauma. - Instruct and provide assistance with proper latch. - Review techniques for milk expression (breast pumping) and storage of breast milk.  P Provide Smoking Cessation handout, if applicable  - Encourage broncho-pulmonary hygiene including cough, deep breathe, Incentive Spirometry  - Assess the need for suctioning and perform as needed  - Assess and instruct to report SOB or any respiratory diff

## 2020-10-29 NOTE — PLAN OF CARE
This RN was notified that patient blood glucose is 69.   4oz of OJ given, will repeat accucheck in 15 min.

## 2020-10-29 NOTE — LACTATION NOTE
This note was copied from a baby's chart. Per patients RN Reggie Kirby mom is not requesting lactation. She is breast and bottle feeding no issues, has pump at home will begin pumping once home. Does not want to pump in hospital. Plan is to go home soon today.

## 2020-10-29 NOTE — LACTATION NOTE
Per patients RN [facility] mom is not requesting lactation. She is breast and bottle feeding no issues, has pump at home will begin pumping once home. Does not want to pump in hospital. Plan is to go home soon today.  Will call LC as needed  Sumeet Lazo,

## 2020-10-29 NOTE — CONSULTS
[place] [facility] - [facility] ID CONSULT NOTE    Caroline Polanco Patient Status:  Inpatient    1990 MRN [id number]   Location [facility] 3SE Attending Mirna Martinez MD   Hosp Day # 1 PCP Remedios Guerrero MD       Reason for Cons pad, , Topical, PRN  •  simethicone (MYLICON) chewable tab 80 mg, 80 mg, Oral, TID PRN  •  Benzocaine-Menthol (DERMOPLAST) 20-0.5 % topical spray 1 spray, 1 spray, Topical, Daily PRN  •  Phenylephrine-Mineral Oil-Pet (FORMULATION R) rectal ointment, , Rect Reviewed    ASSESSMENT:    Antibiotics: Amoxicillin    51-year-old Nepali-speaking female with a history of migraines and gestational diabetes now admitted on 10/28 with contractions, rectal pressure who is 39 weeks gestation.   Ultrasound with rupture of

## 2020-10-30 ENCOUNTER — TELEPHONE (OUTPATIENT)
Dept: OBGYN CLINIC | Facility: CLINIC | Age: 30
End: 2020-10-30

## 2020-10-30 VITALS
OXYGEN SATURATION: 100 % | RESPIRATION RATE: 18 BRPM | HEART RATE: 102 BPM | TEMPERATURE: 99 F | DIASTOLIC BLOOD PRESSURE: 82 MMHG | SYSTOLIC BLOOD PRESSURE: 119 MMHG

## 2020-10-30 PROBLEM — Z34.90 PREGNANCY: Status: RESOLVED | Noted: 2020-10-28 | Resolved: 2020-10-30

## 2020-10-30 PROBLEM — Z34.90 PREGNANCY (HCC): Status: RESOLVED | Noted: 2020-10-28 | Resolved: 2020-10-30

## 2020-10-30 PROBLEM — Z34.90 PREGNANT: Status: RESOLVED | Noted: 2020-10-28 | Resolved: 2020-10-30

## 2020-10-30 PROBLEM — Z34.90 PREGNANT (HCC): Status: RESOLVED | Noted: 2020-10-28 | Resolved: 2020-10-30

## 2020-10-30 NOTE — PROGRESS NOTES
Post-Partum Note   10/30/2020, 10:22 AM    Subjective:  Patient doing well and feeling better this morning. She had one fever overnight but states she does not feel she has fevers at this time. She denies muscle aches. Denies SOB.  She does have some chest

## 2020-10-30 NOTE — PROGRESS NOTES
INFECTIOUS DISEASE PROGRESS NOTE    Dori Elam Patient Status:  Inpatient    1990 MRN [id number]   Location [facility] 3SE Attending Star Perez MD   Hosp Day # 2 PCP Lety Rodas MD     Subjective:  ROS done.  Feels ana # Acute fever - r/o bacteremia, chorioamnionitis, COVID-19  # [id number] diagnosed 10/25 without clear signs of pneumonia on CXR and no hypoxia  # Post strep POC        PLAN:     -  on RDV - day #2 - will get last dose today at 5pm then home after from ID st

## 2020-10-30 NOTE — PLAN OF CARE
Sat with patient to review plan of care. Discussed analgesic options and answered all questions. Temperature 102.7, down to 99 after tylenol. SOB and cough present. Contact and droplet Isolation in negative pressure room for COVID 19 infection. Other VSS. sling, nursing footstool/pillows, and breast pumps). - Encourage mother/other family members to express feelings/concerns, and actively listen. - Educate father/SO about benefits of breast feeding and how to manage common lactation challenges.   - Recomme if applicable  - Encourage broncho-pulmonary hygiene including cough, deep breathe, Incentive Spirometry  - Assess the need for suctioning and perform as needed  - Assess and instruct to report SOB or any respiratory difficulty  - Respiratory Therapy suppo

## 2020-10-31 RX ORDER — ENOXAPARIN SODIUM 100 MG/ML
40 INJECTION SUBCUTANEOUS DAILY
Qty: 28 SYRINGE | Refills: 0 | Status: SHIPPED | OUTPATIENT
Start: 2020-10-31 | End: 2021-01-29

## 2020-10-31 NOTE — PROGRESS NOTES
Pt c/o vaginal pressure. Assisted patient to bed for vaginal exam. Uterus firm at u/u with scant vaginal bleeding. Noted \"bulge\" coming out of vagina. What seemed to be a vaginal prolapse. Dr. Jt Stevens paged and notified. Instructions given.  Per Dr. Jt Stevens, amairani

## 2020-10-31 NOTE — PLAN OF CARE
Problem: POSTPARTUM  Goal: Long Term Goal:Experiences normal postpartum course  Description: INTERVENTIONS:  - Assess and monitor vital signs and lab values. - Assess fundus and lochia. - Provide ice/sitz baths for perineum discomfort.   - Monitor heali medications or substances incompatible with breast feeding.  - Assess and monitor for signs of nipple pain/trauma. - Instruct and provide assistance with proper latch. - Review techniques for milk expression (breast pumping) and storage of breast milk.  P handout, if applicable  - Encourage broncho-pulmonary hygiene including cough, deep breathe, Incentive Spirometry  - Assess the need for suctioning and perform as needed  - Assess and instruct to report SOB or any respiratory difficulty  - Respiratory Ther

## 2020-10-31 NOTE — TELEPHONE ENCOUNTER
Pt was discharged home on 10/30 evening. She is delivered and tested positive for COVID. I forgot to discharge pt home with Lovenox 40 mg SQ q day for 4 weeks.     Please call pt to inform pt of above and give Rx if needed  Pt is [place] speaking

## 2020-10-31 NOTE — TELEPHONE ENCOUNTER
Notified pt of Lovenox order once a day x4 weeks. Pt agrees and is comfortable with administering medication. (Pt was on insulin with prior pregnancy). Advised pt to call us with any questions or concerns. Provided bleeding precautions. Pharmacy verified.

## 2020-11-02 ENCOUNTER — TELEPHONE (OUTPATIENT)
Dept: OBGYN CLINIC | Facility: CLINIC | Age: 30
End: 2020-11-02

## 2020-11-02 NOTE — TELEPHONE ENCOUNTER
Patient was calling to make 2-week appointment as advised by the Dr.  She has tested positive for COVID but wants to be seen because of discharge that she is still having after birth. Please advise.

## 2020-11-03 NOTE — TELEPHONE ENCOUNTER
Pt cannot be seen in office until 2 weeks from now. If severe Sx, then can do office visit.  Pt w/ severe COVID just last week

## 2020-11-03 NOTE — TELEPHONE ENCOUNTER
Used language line [id number]. Pt was positive for Covid 10-25- and pt states she is taking Lovenox. Pt is postpartum and delivered  10/28 with [facility]. Pt was discharged on Saturday and went home.  Pt states that she \"feels like a muscle is stuck in th

## 2020-11-03 NOTE — TELEPHONE ENCOUNTER
Pt notified of recs via  #[id number], Marciano Jensen. Pt states she is feeling better already.   She did state she is having some swelling in her feet so advised to elevate when possible, eliminate as much salt from her diet as possible and continue pushi

## 2020-11-13 ENCOUNTER — TELEPHONE (OUTPATIENT)
Dept: OBGYN UNIT | Facility: HOSPITAL | Age: 30
End: 2020-11-13

## 2020-11-17 ENCOUNTER — TELEPHONE (OUTPATIENT)
Dept: LACTATION | Facility: HOSPITAL | Age: 30
End: 2020-11-17

## 2020-12-18 ENCOUNTER — POSTPARTUM (OUTPATIENT)
Dept: OBGYN CLINIC | Facility: CLINIC | Age: 30
End: 2020-12-18
Payer: MEDICAID

## 2020-12-18 VITALS
DIASTOLIC BLOOD PRESSURE: 79 MMHG | BODY MASS INDEX: 27 KG/M2 | SYSTOLIC BLOOD PRESSURE: 118 MMHG | WEIGHT: 154.63 LBS | HEART RATE: 90 BPM

## 2020-12-18 DIAGNOSIS — Z12.4 SCREENING FOR MALIGNANT NEOPLASM OF CERVIX: ICD-10-CM

## 2020-12-18 PROBLEM — Z86.32 HISTORY OF GESTATIONAL DIABETES IN PRIOR PREGNANCY, CURRENTLY PREGNANT (HCC): Status: RESOLVED | Noted: 2020-04-20 | Resolved: 2020-12-18

## 2020-12-18 PROBLEM — U07.1 COVID-19 VIRUS INFECTION: Status: RESOLVED | Noted: 2020-10-29 | Resolved: 2020-12-18

## 2020-12-18 PROBLEM — O99.891 ZIKA VIRUS EXPOSURE AFFECTING PREGNANCY: Status: RESOLVED | Noted: 2020-04-20 | Resolved: 2020-12-18

## 2020-12-18 PROBLEM — Z20.821 ZIKA VIRUS EXPOSURE AFFECTING PREGNANCY: Status: RESOLVED | Noted: 2020-04-20 | Resolved: 2020-12-18

## 2020-12-18 PROBLEM — O09.299 HISTORY OF GESTATIONAL DIABETES IN PRIOR PREGNANCY, CURRENTLY PREGNANT (HCC): Status: RESOLVED | Noted: 2020-04-20 | Resolved: 2020-12-18

## 2020-12-18 PROBLEM — O09.299 HISTORY OF GESTATIONAL DIABETES IN PRIOR PREGNANCY, CURRENTLY PREGNANT: Status: RESOLVED | Noted: 2020-04-20 | Resolved: 2020-12-18

## 2020-12-18 PROBLEM — Z86.32 HISTORY OF GESTATIONAL DIABETES IN PRIOR PREGNANCY, CURRENTLY PREGNANT: Status: RESOLVED | Noted: 2020-04-20 | Resolved: 2020-12-18

## 2020-12-18 PROBLEM — O99.891 ZIKA VIRUS EXPOSURE AFFECTING PREGNANCY (HCC): Status: RESOLVED | Noted: 2020-04-20 | Resolved: 2020-12-18

## 2020-12-18 PROBLEM — Z20.821 ZIKA VIRUS EXPOSURE AFFECTING PREGNANCY (HCC): Status: RESOLVED | Noted: 2020-04-20 | Resolved: 2020-12-18

## 2020-12-18 PROCEDURE — 0503F POSTPARTUM CARE VISIT: CPT | Performed by: OBSTETRICS & GYNECOLOGY

## 2020-12-18 PROCEDURE — 3078F DIAST BP <80 MM HG: CPT | Performed by: OBSTETRICS & GYNECOLOGY

## 2020-12-18 PROCEDURE — 3074F SYST BP LT 130 MM HG: CPT | Performed by: OBSTETRICS & GYNECOLOGY

## 2020-12-18 NOTE — PROGRESS NOTES
Msaha Swenson is a 27year old female [id number] here for 6 week post-partum visit. Patient delivered a  female infant on 10/28/2020 via spontaneous vaginal delivery. Patient desires something maybe for contraception. Patient is breast feeding. Comments: Denies a D&C       GYNE HISTORY   Menarche: 12   Use of Birth Control (if yes, specify type): NONE    MEDICATIONS:    Current Outpatient Medications:   •  Enoxaparin Sodium (LOVENOX) 40 MG/0.4ML Subcutaneous Solution, Inject 0.4 mL (40 mg total)

## 2021-01-27 NOTE — LETTER
AUTHORIZATION FOR SURGICAL OPERATION OR OTHER PROCEDURE    1. I hereby authorize Dr. Dianna Bloom, and [facility] staff assigned to my case to perform the following operation and/or procedure at the [facility]:    IUD INSERTION    2.   My physician h Patient:           []  Parent    Responsible person                          []  Spouse  In case of minor or                    [] Other  _____________   Incompetent name:  __________________________________________________                               (p LINEAR

## 2021-01-29 ENCOUNTER — OFFICE VISIT (OUTPATIENT)
Dept: FAMILY MEDICINE CLINIC | Facility: CLINIC | Age: 31
End: 2021-01-29
Payer: MEDICAID

## 2021-01-29 VITALS
HEIGHT: 64 IN | BODY MASS INDEX: 27.14 KG/M2 | SYSTOLIC BLOOD PRESSURE: 114 MMHG | HEART RATE: 76 BPM | TEMPERATURE: 98 F | WEIGHT: 159 LBS | DIASTOLIC BLOOD PRESSURE: 74 MMHG

## 2021-01-29 DIAGNOSIS — M54.50 CHRONIC BILATERAL LOW BACK PAIN WITHOUT SCIATICA: Primary | ICD-10-CM

## 2021-01-29 DIAGNOSIS — Z86.16 HISTORY OF 2019 NOVEL CORONAVIRUS DISEASE (COVID-19): ICD-10-CM

## 2021-01-29 DIAGNOSIS — R09.89 PHLEGM IN THROAT: ICD-10-CM

## 2021-01-29 DIAGNOSIS — G89.29 CHRONIC BILATERAL LOW BACK PAIN WITHOUT SCIATICA: Primary | ICD-10-CM

## 2021-01-29 PROCEDURE — 3008F BODY MASS INDEX DOCD: CPT | Performed by: FAMILY MEDICINE

## 2021-01-29 PROCEDURE — 3078F DIAST BP <80 MM HG: CPT | Performed by: FAMILY MEDICINE

## 2021-01-29 PROCEDURE — 3074F SYST BP LT 130 MM HG: CPT | Performed by: FAMILY MEDICINE

## 2021-01-29 PROCEDURE — 99204 OFFICE O/P NEW MOD 45 MIN: CPT | Performed by: FAMILY MEDICINE

## 2021-01-29 RX ORDER — NAPROXEN 500 MG/1
500 TABLET ORAL 2 TIMES DAILY PRN
Qty: 45 TABLET | Refills: 0 | Status: SHIPPED | OUTPATIENT
Start: 2021-01-29

## 2021-01-29 RX ORDER — LORATADINE 10 MG/1
1 CAPSULE, LIQUID FILLED ORAL DAILY
Qty: 30 CAPSULE | Refills: 0 | Status: SHIPPED | OUTPATIENT
Start: 2021-01-29 | End: 2021-02-28

## 2021-01-29 RX ORDER — CYCLOBENZAPRINE HCL 10 MG
10 TABLET ORAL 2 TIMES DAILY PRN
Qty: 30 TABLET | Refills: 1 | Status: SHIPPED | OUTPATIENT
Start: 2021-01-29

## 2021-01-29 NOTE — PROGRESS NOTES
HPI:   Shari Mei is a 27year old  female who presents for an establish care visit. Current concerns:   Delivered via  2020, just prior to the delivery she was diagnosed with Covid.    Was given Lovenox injections for the follo Smokeless tobacco: Never Used    Alcohol use: Not Currently      Alcohol/week: 0.0 standard drinks      Frequency: Monthly or less      Drinks per session: 1 or 2      Binge frequency: Never      Comment: None.      Drug use: Never         REVIEW OF SYSTEMS improved back to baseline yet. RTC if no improvement in symptoms. Red flags discussed to go to ER.      Mary Kate Oshea MD  1/29/2021  8:08 AM

## 2021-02-18 ENCOUNTER — OFFICE VISIT (OUTPATIENT)
Dept: PHYSICAL THERAPY | Age: 31
End: 2021-02-18
Attending: FAMILY MEDICINE
Payer: MEDICAID

## 2021-02-18 DIAGNOSIS — G89.29 CHRONIC BILATERAL LOW BACK PAIN WITHOUT SCIATICA: ICD-10-CM

## 2021-02-18 DIAGNOSIS — M54.50 CHRONIC BILATERAL LOW BACK PAIN WITHOUT SCIATICA: ICD-10-CM

## 2021-02-18 PROCEDURE — 97110 THERAPEUTIC EXERCISES: CPT | Performed by: PHYSICAL THERAPIST

## 2021-02-18 PROCEDURE — 97161 PT EVAL LOW COMPLEX 20 MIN: CPT | Performed by: PHYSICAL THERAPIST

## 2021-02-18 NOTE — PROGRESS NOTES
P.T. EVALUATION:   Referring Physician: Dr. Caridad Santoro  Diagnosis: Chronic bilateral low back pain without sciatica (M54.5,G89.29)    Date of Onset: 1/29/2020 Date of Service: 2/18/2021     PATIENT SUMMARY   Patient verbalized consent for Physical Therapy christy extension. Strength/MMT: Trunk strength is grossly 3/5. B hips grossly 4/5 into extension, abduction. Grossly 5/5 throughout B LE's. Sensation: WNL to light touch.     Special tests: (-) SLR, (-) Gaenslen's, (-) Fabere's    Posture: some slouching dur sign and return this letter via fax as soon as possible to [phone].  If you have any questions, please contact me at Dept: [phone]    Sincerely,  Electronically signed by therapist: Debby Bowen, PT      Certification From: 3/19/4802  To:5/19/20

## 2021-02-19 ENCOUNTER — TELEPHONE (OUTPATIENT)
Dept: PHYSICAL THERAPY | Facility: HOSPITAL | Age: 31
End: 2021-02-19

## 2021-02-22 ENCOUNTER — APPOINTMENT (OUTPATIENT)
Dept: PHYSICAL THERAPY | Age: 31
End: 2021-02-22
Attending: FAMILY MEDICINE
Payer: MEDICAID

## 2021-03-01 ENCOUNTER — TELEPHONE (OUTPATIENT)
Dept: OBGYN CLINIC | Facility: CLINIC | Age: 31
End: 2021-03-01

## 2021-03-02 ENCOUNTER — OFFICE VISIT (OUTPATIENT)
Dept: PHYSICAL THERAPY | Age: 31
End: 2021-03-02
Attending: FAMILY MEDICINE
Payer: MEDICAID

## 2021-03-02 PROCEDURE — 97110 THERAPEUTIC EXERCISES: CPT | Performed by: PHYSICAL THERAPIST

## 2021-03-02 NOTE — PROGRESS NOTES
Diagnosis: Chronic bilateral low back pain without sciatica (M54.5,G89.29)          Next MD visit: none scheduled  Fall Risk: standard         Precautions: n/a          Medication Changes since last visit?: No      Subjective: States she had mild back pa

## 2021-03-03 NOTE — TELEPHONE ENCOUNTER
Azeri speaking pt.  was in to [facility] 12/2020 and discussed BC options. States wants to discuss further about IUD before annual on 4/1. Pt advised will send msg to [facility] to advised if can be scheduled any time sooner.  Pt ok with this and states understand

## 2021-03-03 NOTE — TELEPHONE ENCOUNTER
Pt advised of [facility]'s msg below and states understanding. Pt accepted first available 20 min appt on 3/18.

## 2021-03-05 ENCOUNTER — OFFICE VISIT (OUTPATIENT)
Dept: PHYSICAL THERAPY | Age: 31
End: 2021-03-05
Attending: FAMILY MEDICINE
Payer: MEDICAID

## 2021-03-05 PROCEDURE — 97014 ELECTRIC STIMULATION THERAPY: CPT | Performed by: PHYSICAL THERAPIST

## 2021-03-05 PROCEDURE — 97110 THERAPEUTIC EXERCISES: CPT | Performed by: PHYSICAL THERAPIST

## 2021-03-05 NOTE — PROGRESS NOTES
Diagnosis: Chronic bilateral low back pain without sciatica (M54.5,G89.29)          Next MD visit: none scheduled  Fall Risk: standard         Precautions: n/a          Medication Changes since last visit?: No      Subjective: Complains of back pain toda residual symptoms. 2. Patient will report back pain of not more than 1/10 during activity. 3. Increase trunk ROM to WNL into all planes to improve trunk mobility.   4. Increase strength to grossly 5/5 throughout to resume previous activities without diffi

## 2021-03-09 ENCOUNTER — OFFICE VISIT (OUTPATIENT)
Dept: PHYSICAL THERAPY | Age: 31
End: 2021-03-09
Attending: FAMILY MEDICINE
Payer: MEDICAID

## 2021-03-09 PROCEDURE — 97014 ELECTRIC STIMULATION THERAPY: CPT | Performed by: PHYSICAL THERAPIST

## 2021-03-09 PROCEDURE — 97110 THERAPEUTIC EXERCISES: CPT | Performed by: PHYSICAL THERAPIST

## 2021-03-09 NOTE — PROGRESS NOTES
Diagnosis: Chronic bilateral low back pain without sciatica (M54.5,G89.29)          Next MD visit: none scheduled  Fall Risk: standard         Precautions: n/a          Medication Changes since last visit?: No      Subjective: Complains of back pain toda supine hamstring stretch with belt 10 x 5 sec hold       modalities - IFC/heat to lower back in prone for pain relief x 15min - IFC/heat to lower back in prone for pain relief x 15min                    Assessment: Patient did well with progression of ther

## 2021-03-12 ENCOUNTER — OFFICE VISIT (OUTPATIENT)
Dept: PHYSICAL THERAPY | Age: 31
End: 2021-03-12
Attending: FAMILY MEDICINE
Payer: MEDICAID

## 2021-03-12 PROCEDURE — 97110 THERAPEUTIC EXERCISES: CPT | Performed by: PHYSICAL THERAPIST

## 2021-03-12 PROCEDURE — 97014 ELECTRIC STIMULATION THERAPY: CPT | Performed by: PHYSICAL THERAPIST

## 2021-03-12 NOTE — PROGRESS NOTES
Diagnosis: Chronic bilateral low back pain without sciatica (M54.5,G89.29)          Next MD visit: none scheduled  Fall Risk: standard         Precautions: n/a          Medication Changes since last visit?: No      Subjective: States her back was sore ye resume previous activities without difficulty. 5. Patient will verbalize and demonstrate strategies to improve posture during activity. Plan: Continue PT. Progress therapeutic exercises and modalities prn for pain relief.       Charges: EX2, E

## 2021-03-16 ENCOUNTER — OFFICE VISIT (OUTPATIENT)
Dept: PHYSICAL THERAPY | Age: 31
End: 2021-03-16
Attending: FAMILY MEDICINE
Payer: MEDICAID

## 2021-03-16 PROCEDURE — 97110 THERAPEUTIC EXERCISES: CPT | Performed by: PHYSICAL THERAPIST

## 2021-03-16 PROCEDURE — 97014 ELECTRIC STIMULATION THERAPY: CPT | Performed by: PHYSICAL THERAPIST

## 2021-03-16 NOTE — PROGRESS NOTES
Diagnosis: Chronic bilateral low back pain without sciatica (M54.5,G89.29)          Next MD visit: none scheduled  Fall Risk: standard         Precautions: n/a          Medication Changes since last visit?: No      Subjective: States her back is feeling planes. Some residual back pain during repeated trunk motion. Patient and PT goals are in progress. Goals     • Therapy Goals      1. Patient will be independent with HEP, its progression, and management of residual symptoms.   2. Patient will report justin

## 2021-03-18 ENCOUNTER — OFFICE VISIT (OUTPATIENT)
Dept: OBGYN CLINIC | Facility: CLINIC | Age: 31
End: 2021-03-18
Payer: MEDICAID

## 2021-03-18 VITALS
SYSTOLIC BLOOD PRESSURE: 117 MMHG | BODY MASS INDEX: 27 KG/M2 | WEIGHT: 159 LBS | DIASTOLIC BLOOD PRESSURE: 74 MMHG | HEART RATE: 80 BPM

## 2021-03-18 DIAGNOSIS — Z30.09 BIRTH CONTROL COUNSELING: Primary | ICD-10-CM

## 2021-03-18 DIAGNOSIS — Z30.430 ENCOUNTER FOR INSERTION OF INTRAUTERINE CONTRACEPTIVE DEVICE: ICD-10-CM

## 2021-03-18 PROCEDURE — 3074F SYST BP LT 130 MM HG: CPT | Performed by: OBSTETRICS & GYNECOLOGY

## 2021-03-18 PROCEDURE — 3078F DIAST BP <80 MM HG: CPT | Performed by: OBSTETRICS & GYNECOLOGY

## 2021-03-18 PROCEDURE — 99213 OFFICE O/P EST LOW 20 MIN: CPT | Performed by: OBSTETRICS & GYNECOLOGY

## 2021-03-18 PROCEDURE — 58300 INSERT INTRAUTERINE DEVICE: CPT | Performed by: OBSTETRICS & GYNECOLOGY

## 2021-03-18 RX ORDER — LORATADINE 10 MG/1
10 TABLET ORAL DAILY
COMMUNITY
Start: 2021-01-29

## 2021-03-18 NOTE — PROGRESS NOTES
Pauline Duncan is a 27year old female [id number] Patient's last menstrual period was 03/13/2021. Patient presents with:  Contraception: wishes to discuss IUDs. On period currently. Periods are heavy.  Has annual appt in few weeks but did not want to kaitlynn   Minutes of Exercise per Session:   Stress:       Feeling of Stress :   Social Connections:       Frequency of Communication with Friends and Family:       Frequency of Social Gatherings with Friends and Family:       Attends [employer]:       Act Levonorgestrel IUD 20 mcg        Requested Prescriptions      No prescriptions requested or ordered in this encounter     Reviewed options of BCM in detail (partner on phone then MA for translation)-- reviewed mirena vs paragard IUD -- wishes for mirena IU

## 2021-03-18 NOTE — PROCEDURES
IUD Insertion     Birth control method(s) used: NONE  LMP: 3/13/21    Consent signed. Procedure discussed with the patient in detail including indication, risks, benefits, alternatives and complications.     Pelvic Exam Findings:  Uterus: normal    Procedu

## 2021-03-18 NOTE — PROGRESS NOTES
HPI/Subjective:   Patient ID: Igor Garcia is a [address] old female. HPI    History/Other:   Review of Systems  Current Outpatient Medications   Medication Sig Dispense Refill   • loratadine 10 MG Oral Tab Take 10 mg by mouth daily.      • cyclobe

## 2021-03-19 ENCOUNTER — APPOINTMENT (OUTPATIENT)
Dept: PHYSICAL THERAPY | Age: 31
End: 2021-03-19
Attending: FAMILY MEDICINE
Payer: MEDICAID

## 2021-03-23 ENCOUNTER — APPOINTMENT (OUTPATIENT)
Dept: PHYSICAL THERAPY | Age: 31
End: 2021-03-23
Attending: FAMILY MEDICINE
Payer: MEDICAID

## 2021-03-25 ENCOUNTER — OFFICE VISIT (OUTPATIENT)
Dept: PHYSICAL THERAPY | Age: 31
End: 2021-03-25
Attending: FAMILY MEDICINE
Payer: MEDICAID

## 2021-03-25 PROCEDURE — 97161 PT EVAL LOW COMPLEX 20 MIN: CPT | Performed by: PHYSICAL THERAPIST

## 2021-03-25 PROCEDURE — 97110 THERAPEUTIC EXERCISES: CPT | Performed by: PHYSICAL THERAPIST

## 2021-03-25 NOTE — PROGRESS NOTES
Discharge Summary  Pt has attended 7 visits in Physical Therapy.          Diagnosis: Chronic bilateral low back pain without sciatica (M54.5,G89.29)          Next MD visit: none scheduled  Fall Risk: standard         Precautions: n/a          Medication Liv trunk ROM to WNL into all planes to improve trunk mobility. 4. Increase strength to grossly 5/5 throughout to resume previous activities without difficulty. 5. Patient will verbalize and demonstrate strategies to improve posture during activity.

## 2021-08-23 ENCOUNTER — APPOINTMENT (OUTPATIENT)
Dept: CT IMAGING | Facility: HOSPITAL | Age: 31
End: 2021-08-23
Attending: EMERGENCY MEDICINE
Payer: MEDICAID

## 2021-08-23 ENCOUNTER — HOSPITAL ENCOUNTER (EMERGENCY)
Facility: HOSPITAL | Age: 31
Discharge: HOME | End: 2021-08-23
Attending: EMERGENCY MEDICINE
Payer: MEDICAID

## 2021-08-23 VITALS
HEART RATE: 98 BPM | SYSTOLIC BLOOD PRESSURE: 127 MMHG | TEMPERATURE: 99 F | RESPIRATION RATE: 18 BRPM | OXYGEN SATURATION: 98 % | HEIGHT: 63.78 IN | DIASTOLIC BLOOD PRESSURE: 90 MMHG | WEIGHT: 148 LBS | BODY MASS INDEX: 25.58 KG/M2

## 2021-08-23 DIAGNOSIS — K57.92 ACUTE DIVERTICULITIS: Primary | ICD-10-CM

## 2021-08-23 LAB
ALBUMIN SERPL-MCNC: 3.9 G/DL (ref 3.4–5)
ALP LIVER SERPL-CCNC: 59 U/L
ALT SERPL-CCNC: 21 U/L
ANION GAP SERPL CALC-SCNC: 5 MMOL/L (ref 0–18)
AST SERPL-CCNC: 8 U/L (ref 15–37)
B-HCG UR QL: NEGATIVE
BASOPHILS # BLD AUTO: 0.03 X10(3) UL (ref 0–0.2)
BASOPHILS NFR BLD AUTO: 0.2 %
BILIRUB DIRECT SERPL-MCNC: <0.1 MG/DL (ref 0–0.2)
BILIRUB SERPL-MCNC: 0.2 MG/DL (ref 0.1–2)
BILIRUB UR QL: NEGATIVE
BUN BLD-MCNC: 12 MG/DL (ref 7–18)
BUN/CREAT SERPL: 20.7 (ref 10–20)
CALCIUM BLD-MCNC: 8.8 MG/DL (ref 8.5–10.1)
CHLORIDE SERPL-SCNC: 106 MMOL/L (ref 98–112)
CLARITY UR: CLEAR
CO2 SERPL-SCNC: 25 MMOL/L (ref 21–32)
COLOR UR: YELLOW
CREAT BLD-MCNC: 0.58 MG/DL
DEPRECATED RDW RBC AUTO: 40 FL (ref 35.1–46.3)
EOSINOPHIL # BLD AUTO: 0.01 X10(3) UL (ref 0–0.7)
EOSINOPHIL NFR BLD AUTO: 0.1 %
ERYTHROCYTE [DISTWIDTH] IN BLOOD BY AUTOMATED COUNT: 13.7 % (ref 11–15)
GLUCOSE BLD-MCNC: 89 MG/DL (ref 70–99)
GLUCOSE UR-MCNC: NEGATIVE MG/DL
HCT VFR BLD AUTO: 36.1 %
HGB BLD-MCNC: 11.3 G/DL
HGB UR QL STRIP.AUTO: NEGATIVE
IMM GRANULOCYTES # BLD AUTO: 0.05 X10(3) UL (ref 0–1)
IMM GRANULOCYTES NFR BLD: 0.3 %
KETONES UR-MCNC: NEGATIVE MG/DL
LIPASE SERPL-CCNC: 95 U/L (ref 73–393)
LYMPHOCYTES # BLD AUTO: 1.66 X10(3) UL (ref 1–4)
LYMPHOCYTES NFR BLD AUTO: 10.2 %
M PROTEIN MFR SERPL ELPH: 7.8 G/DL (ref 6.4–8.2)
MCH RBC QN AUTO: 25.3 PG (ref 26–34)
MCHC RBC AUTO-ENTMCNC: 31.3 G/DL (ref 31–37)
MCV RBC AUTO: 80.8 FL
MONOCYTES # BLD AUTO: 0.57 X10(3) UL (ref 0.1–1)
MONOCYTES NFR BLD AUTO: 3.5 %
NEUTROPHILS # BLD AUTO: 13.95 X10 (3) UL (ref 1.5–7.7)
NEUTROPHILS # BLD AUTO: 13.95 X10(3) UL (ref 1.5–7.7)
NEUTROPHILS NFR BLD AUTO: 85.7 %
NITRITE UR QL STRIP.AUTO: NEGATIVE
OSMOLALITY SERPL CALC.SUM OF ELEC: 281 MOSM/KG (ref 275–295)
PH UR: 8 [PH] (ref 5–8)
PLATELET # BLD AUTO: 305 10(3)UL (ref 150–450)
POTASSIUM SERPL-SCNC: 3.6 MMOL/L (ref 3.5–5.1)
PROT UR-MCNC: NEGATIVE MG/DL
RBC # BLD AUTO: 4.47 X10(6)UL
SODIUM SERPL-SCNC: 136 MMOL/L (ref 136–145)
SP GR UR STRIP: 1.02 (ref 1–1.03)
UROBILINOGEN UR STRIP-ACNC: <2
WBC # BLD AUTO: 16.3 X10(3) UL (ref 4–11)

## 2021-08-23 PROCEDURE — 80048 BASIC METABOLIC PNL TOTAL CA: CPT | Performed by: EMERGENCY MEDICINE

## 2021-08-23 PROCEDURE — 74176 CT ABD & PELVIS W/O CONTRAST: CPT | Performed by: EMERGENCY MEDICINE

## 2021-08-23 PROCEDURE — 87086 URINE CULTURE/COLONY COUNT: CPT

## 2021-08-23 PROCEDURE — 81001 URINALYSIS AUTO W/SCOPE: CPT

## 2021-08-23 PROCEDURE — 83690 ASSAY OF LIPASE: CPT | Performed by: EMERGENCY MEDICINE

## 2021-08-23 PROCEDURE — 81001 URINALYSIS AUTO W/SCOPE: CPT | Performed by: EMERGENCY MEDICINE

## 2021-08-23 PROCEDURE — 96374 THER/PROPH/DIAG INJ IV PUSH: CPT

## 2021-08-23 PROCEDURE — 80076 HEPATIC FUNCTION PANEL: CPT | Performed by: EMERGENCY MEDICINE

## 2021-08-23 PROCEDURE — 81025 URINE PREGNANCY TEST: CPT

## 2021-08-23 PROCEDURE — 87086 URINE CULTURE/COLONY COUNT: CPT | Performed by: EMERGENCY MEDICINE

## 2021-08-23 PROCEDURE — 85025 COMPLETE CBC W/AUTO DIFF WBC: CPT | Performed by: EMERGENCY MEDICINE

## 2021-08-23 PROCEDURE — 96361 HYDRATE IV INFUSION ADD-ON: CPT

## 2021-08-23 PROCEDURE — 99284 EMERGENCY DEPT VISIT MOD MDM: CPT

## 2021-08-23 RX ORDER — SODIUM CHLORIDE 9 MG/ML
INJECTION, SOLUTION INTRAVENOUS CONTINUOUS
Status: DISCONTINUED | OUTPATIENT
Start: 2021-08-23 | End: 2021-08-24

## 2021-08-23 RX ORDER — KETOROLAC TROMETHAMINE 10 MG/1
10 TABLET, FILM COATED ORAL EVERY 6 HOURS PRN
Qty: 15 TABLET | Refills: 0 | Status: SHIPPED | OUTPATIENT
Start: 2021-08-23 | End: 2021-08-30

## 2021-08-23 RX ORDER — AMOXICILLIN AND CLAVULANATE POTASSIUM 875; 125 MG/1; MG/1
1 TABLET, FILM COATED ORAL 2 TIMES DAILY
Qty: 14 TABLET | Refills: 0 | Status: SHIPPED | OUTPATIENT
Start: 2021-08-23 | End: 2021-08-30

## 2021-08-23 RX ORDER — KETOROLAC TROMETHAMINE 15 MG/ML
15 INJECTION, SOLUTION INTRAMUSCULAR; INTRAVENOUS ONCE
Status: COMPLETED | OUTPATIENT
Start: 2021-08-23 | End: 2021-08-23

## 2021-08-24 NOTE — ED PROVIDER NOTES
Patient Seen in: [facility] AND [facility] Emergency Department    History   Patient presents with:  Abdomen/Flank Pain    Stated Complaint:     HPI    Patient complains of l sided abdominal pain that began yesterday. Pain described as aching.   Pain rated as 6/ above.    PSFH elements reviewed from today and agreed except as otherwise stated in HPI.     Physical Exam     ED Triage Vitals [08/23/21 1945]   /89   Pulse 103   Resp 20   Temp 99.3 °F (37.4 °C)   Temp src Temporal   SpO2 99 %   O2 Device None [employer] Neutrophil Absolute Prelim 13.95 (*)     Neutrophil Absolute 13.95 (*)     All other components within normal limits   LIPASE - Normal   POCT PREGNANCY URINE - Normal   CBC WITH DIFFERENTIAL WITH PLATELET    Narrative:      The following orders were created

## 2021-08-24 NOTE — ED INITIAL ASSESSMENT (HPI)
Left sided flank pain which radiates to her back, since this morning. Also nauseated, no vomiting. Denies urinary complaints. Indian language line  used for triage.

## 2021-09-21 ENCOUNTER — OFFICE VISIT (OUTPATIENT)
Dept: FAMILY MEDICINE CLINIC | Facility: CLINIC | Age: 31
End: 2021-09-21
Payer: MEDICAID

## 2021-09-21 VITALS
DIASTOLIC BLOOD PRESSURE: 71 MMHG | HEART RATE: 87 BPM | SYSTOLIC BLOOD PRESSURE: 105 MMHG | TEMPERATURE: 99 F | BODY MASS INDEX: 25.06 KG/M2 | HEIGHT: 63.75 IN | WEIGHT: 145 LBS

## 2021-09-21 DIAGNOSIS — K57.92 DIVERTICULITIS: Primary | ICD-10-CM

## 2021-09-21 DIAGNOSIS — T83.32XA MALPOSITIONED INTRAUTERINE DEVICE (IUD), INITIAL ENCOUNTER: ICD-10-CM

## 2021-09-21 PROCEDURE — 3074F SYST BP LT 130 MM HG: CPT | Performed by: FAMILY MEDICINE

## 2021-09-21 PROCEDURE — 99213 OFFICE O/P EST LOW 20 MIN: CPT | Performed by: FAMILY MEDICINE

## 2021-09-21 PROCEDURE — 3008F BODY MASS INDEX DOCD: CPT | Performed by: FAMILY MEDICINE

## 2021-09-21 PROCEDURE — 3078F DIAST BP <80 MM HG: CPT | Performed by: FAMILY MEDICINE

## 2021-09-21 NOTE — PATIENT INSTRUCTIONS
Instrucciones de raymond para la diverticulitis    Para prevenir la diverticulitis en el futuro  Los siguientes consejos lo ayudarán a prevenir la diverticulitis:  · Consuma amarilis dieta radha en Rosa.  La fibra aumenta el volumen de las heces para que pasen co No complaints

## 2021-09-21 NOTE — PROGRESS NOTES
Patient presents with:  ER F/U: seen on 8/23 for acute diverticulitis    HPI:   Shari Mei is a 32year old female who presents to clinic for ER follow-up for acute diverticulitis.   Was seen in the emergency room on 8/23 and CT scan showing diver

## 2021-09-28 ENCOUNTER — HOSPITAL ENCOUNTER (OUTPATIENT)
Dept: ULTRASOUND IMAGING | Age: 31
Discharge: HOME | End: 2021-09-28
Attending: FAMILY MEDICINE
Payer: MEDICAID

## 2021-09-28 DIAGNOSIS — T83.32XA MALPOSITIONED INTRAUTERINE DEVICE (IUD), INITIAL ENCOUNTER: ICD-10-CM

## 2021-09-28 PROCEDURE — 76830 TRANSVAGINAL US NON-OB: CPT | Performed by: FAMILY MEDICINE

## 2021-09-28 PROCEDURE — 76856 US EXAM PELVIC COMPLETE: CPT | Performed by: FAMILY MEDICINE

## 2021-10-05 ENCOUNTER — LAB ENCOUNTER (OUTPATIENT)
Dept: LAB | Age: 31
End: 2021-10-05
Attending: FAMILY MEDICINE
Payer: MEDICAID

## 2021-10-05 ENCOUNTER — OFFICE VISIT (OUTPATIENT)
Dept: FAMILY MEDICINE CLINIC | Facility: CLINIC | Age: 31
End: 2021-10-05
Payer: MEDICAID

## 2021-10-05 VITALS
SYSTOLIC BLOOD PRESSURE: 95 MMHG | TEMPERATURE: 98 F | DIASTOLIC BLOOD PRESSURE: 62 MMHG | WEIGHT: 143 LBS | BODY MASS INDEX: 24.71 KG/M2 | HEIGHT: 63.75 IN | HEART RATE: 64 BPM

## 2021-10-05 DIAGNOSIS — Z00.00 ANNUAL PHYSICAL EXAM: Primary | ICD-10-CM

## 2021-10-05 DIAGNOSIS — T83.32XA MALPOSITIONED INTRAUTERINE DEVICE (IUD), INITIAL ENCOUNTER: ICD-10-CM

## 2021-10-05 DIAGNOSIS — Z87.19 HISTORY OF COLONIC DIVERTICULITIS: ICD-10-CM

## 2021-10-05 PROCEDURE — 3074F SYST BP LT 130 MM HG: CPT | Performed by: FAMILY MEDICINE

## 2021-10-05 PROCEDURE — 3078F DIAST BP <80 MM HG: CPT | Performed by: FAMILY MEDICINE

## 2021-10-05 PROCEDURE — 85025 COMPLETE CBC W/AUTO DIFF WBC: CPT | Performed by: FAMILY MEDICINE

## 2021-10-05 PROCEDURE — 84443 ASSAY THYROID STIM HORMONE: CPT | Performed by: FAMILY MEDICINE

## 2021-10-05 PROCEDURE — 36415 COLL VENOUS BLD VENIPUNCTURE: CPT | Performed by: FAMILY MEDICINE

## 2021-10-05 PROCEDURE — 99395 PREV VISIT EST AGE 18-39: CPT | Performed by: FAMILY MEDICINE

## 2021-10-05 PROCEDURE — 83036 HEMOGLOBIN GLYCOSYLATED A1C: CPT | Performed by: FAMILY MEDICINE

## 2021-10-05 PROCEDURE — 80053 COMPREHEN METABOLIC PANEL: CPT | Performed by: FAMILY MEDICINE

## 2021-10-05 PROCEDURE — 3008F BODY MASS INDEX DOCD: CPT | Performed by: FAMILY MEDICINE

## 2021-10-05 PROCEDURE — 80061 LIPID PANEL: CPT | Performed by: FAMILY MEDICINE

## 2021-10-05 RX ORDER — ASPIRIN 81 MG
100 TABLET, DELAYED RELEASE (ENTERIC COATED) ORAL 2 TIMES DAILY
Qty: 30 TABLET | Refills: 2 | Status: SHIPPED | OUTPATIENT
Start: 2021-10-05

## 2021-10-13 PROBLEM — R73.03 PREDIABETES: Status: ACTIVE | Noted: 2021-10-13

## 2021-10-20 NOTE — TELEPHONE ENCOUNTER
Message left to call physicians office with questions. Cradle call letter sent via mail. (4) excellent

## 2021-10-26 ENCOUNTER — OFFICE VISIT (OUTPATIENT)
Dept: OBGYN CLINIC | Facility: CLINIC | Age: 31
End: 2021-10-26
Payer: MEDICAID

## 2021-10-26 VITALS
BODY MASS INDEX: 25 KG/M2 | SYSTOLIC BLOOD PRESSURE: 106 MMHG | WEIGHT: 143.19 LBS | DIASTOLIC BLOOD PRESSURE: 64 MMHG | HEART RATE: 76 BPM

## 2021-10-26 DIAGNOSIS — T83.32XD MALPOSITIONED INTRAUTERINE DEVICE (IUD), SUBSEQUENT ENCOUNTER: Primary | ICD-10-CM

## 2021-10-26 PROCEDURE — 3078F DIAST BP <80 MM HG: CPT | Performed by: OBSTETRICS & GYNECOLOGY

## 2021-10-26 PROCEDURE — 99212 OFFICE O/P EST SF 10 MIN: CPT | Performed by: OBSTETRICS & GYNECOLOGY

## 2021-10-26 PROCEDURE — 3074F SYST BP LT 130 MM HG: CPT | Performed by: OBSTETRICS & GYNECOLOGY

## 2021-10-26 PROCEDURE — 58301 REMOVE INTRAUTERINE DEVICE: CPT | Performed by: OBSTETRICS & GYNECOLOGY

## 2021-10-26 RX ORDER — MULTIVITAMIN
TABLET ORAL
COMMUNITY

## 2021-10-26 NOTE — PROGRESS NOTES
Joceline Stearns is a 32year old female [id number] Patient's last menstrual period was 10/14/2021. Patient presents with: Follow - Up: IUD CHECK -- u/s showing IUD displaced. Here for next step  Contraception  .     OBSTETRICS HISTORY:  OB History   G4 on file      Minutes of Exercise per Session: Not on file  Stress:       Feeling of Stress : Not on file  Social Connections:       Frequency of Communication with Friends and Family: Not on file      Frequency of Social Gatherings with Friends and Family: constipation  Genitourinary:    denies dysuria, abnormal vaginal discharge, vaginal itching  Musculoskeletal:   denies back pain. Neurological:    denies headaches, extremity weakness or numbness. Psychiatric:   denies depression or anxiety.         PHYSI normal.          CONCLUSION:  1. Malpositioned intrauterine device in the cervix. 2. 1.6 cm complex left ovarian cyst with some peripheral echogenic components.   This appears similar to previous ultrasounds 2016 and when correlated with the recent CT abdom

## 2021-10-26 NOTE — PROCEDURES
IUD Removal     Consent signed. Indication: dislodged IUD    Procedure discussed with the patient in detail including indication, risks, benefits, alternatives and complications.     Pelvic Exam Findings:  Lesion description:  IUD strings seen from cervix

## 2021-12-13 ENCOUNTER — TELEPHONE (OUTPATIENT)
Dept: FAMILY MEDICINE CLINIC | Facility: CLINIC | Age: 31
End: 2021-12-13

## 2021-12-13 ENCOUNTER — OFFICE VISIT (OUTPATIENT)
Dept: GASTROENTEROLOGY | Facility: CLINIC | Age: 31
End: 2021-12-13
Payer: MEDICAID

## 2021-12-13 ENCOUNTER — TELEPHONE (OUTPATIENT)
Dept: GASTROENTEROLOGY | Facility: CLINIC | Age: 31
End: 2021-12-13

## 2021-12-13 VITALS
WEIGHT: 142 LBS | TEMPERATURE: 99 F | HEART RATE: 86 BPM | SYSTOLIC BLOOD PRESSURE: 107 MMHG | HEIGHT: 64 IN | BODY MASS INDEX: 24.24 KG/M2 | DIASTOLIC BLOOD PRESSURE: 71 MMHG

## 2021-12-13 DIAGNOSIS — E61.1 IRON DEFICIENCY: Primary | ICD-10-CM

## 2021-12-13 DIAGNOSIS — K57.32 DIVERTICULITIS OF LARGE INTESTINE, UNSPECIFIED BLEEDING STATUS, UNSPECIFIED COMPLICATION STATUS: Primary | ICD-10-CM

## 2021-12-13 DIAGNOSIS — D50.9 IRON DEFICIENCY ANEMIA, UNSPECIFIED IRON DEFICIENCY ANEMIA TYPE: ICD-10-CM

## 2021-12-13 DIAGNOSIS — R79.89 ABNORMAL BUN-TO-CREATININE RATIO: ICD-10-CM

## 2021-12-13 DIAGNOSIS — K57.32 DIVERTICULITIS OF LARGE INTESTINE WITHOUT PERFORATION OR ABSCESS WITHOUT BLEEDING: Primary | ICD-10-CM

## 2021-12-13 PROCEDURE — 3074F SYST BP LT 130 MM HG: CPT | Performed by: INTERNAL MEDICINE

## 2021-12-13 PROCEDURE — 3008F BODY MASS INDEX DOCD: CPT | Performed by: INTERNAL MEDICINE

## 2021-12-13 PROCEDURE — 99204 OFFICE O/P NEW MOD 45 MIN: CPT | Performed by: INTERNAL MEDICINE

## 2021-12-13 PROCEDURE — 3078F DIAST BP <80 MM HG: CPT | Performed by: INTERNAL MEDICINE

## 2021-12-13 RX ORDER — POLYETHYLENE GLYCOL 3350, SODIUM CHLORIDE, SODIUM BICARBONATE, POTASSIUM CHLORIDE 420; 11.2; 5.72; 1.48 G/4L; G/4L; G/4L; G/4L
POWDER, FOR SOLUTION ORAL
Qty: 1 EACH | Refills: 0 | Status: SHIPPED | OUTPATIENT
Start: 2021-12-13

## 2021-12-13 NOTE — PATIENT INSTRUCTIONS
1. Schedule EGD/colonoscopy with MAC [Diagnosis: BUN/creat elevated, anemia, diverticulitis]    2.  bowel prep from pharmacy (split suprep or trilyte)    3.  Continue all medications for procedure except    ** If MAC @ [facility]/[place]:    - NO alcohol, recrea

## 2021-12-13 NOTE — TELEPHONE ENCOUNTER
Scheduled for: Colonoscopy [address] [facility]   Provider Name: Dr Ginger Cotto   Date: Fri 1/28/2021  Location: [facility]  Sedation: MAC    Time: 10 am  Prep: split trilyte   Meds/Allergies Reconciled?: NKDA   Diagnosis with codes: Diverticulitis K57.32, Anemia D50.9, BU

## 2021-12-13 NOTE — H&P
[facility], [facility] - Gastroenterology  Clinic History and Physical     Patient presents with:  Consult: Diverticulitis      HPI:   Mica Wheat is a 32year old female patient of Dr. Miguel A aGrcia, with history of migraines, presenting for follow up for div sodium 100 MG Oral Tab Take 1 tablet (100 mg total) by mouth 2 (two) times daily. (Patient not taking: No sig reported) 30 tablet 2   • loratadine 10 MG Oral Tab Take 10 mg by mouth daily.  (Patient not taking: No sig reported)     • cyclobenzaprine 10 MG O jaundice  Ext: no cyanosis, clubbing or edema is evident. Neuro: Alert and interactive, and gross movements of extremities normal    Labs/Imaging:   Patient's labs and imaging were reviewed and discussed with patient today.       Recent Labs     10/29/20 - continue all medications as prescribed         EGD and Colonoscopy consent: I have discussed the risks, benefits, and alternatives to colonoscopy and EGD with the patient [who demonstrated understanding], including but not limited to the risks of bleedin

## 2021-12-13 NOTE — TELEPHONE ENCOUNTER
Patient, states that her anemia is low. Pt is not having any symptoms, but, would like to know what the doctor recommends for her take to help with her anemia.

## 2021-12-14 RX ORDER — FERROUS SULFATE 325(65) MG
325 TABLET ORAL
Qty: 90 TABLET | Refills: 0 | Status: SHIPPED | OUTPATIENT
Start: 2021-12-14

## 2021-12-14 NOTE — TELEPHONE ENCOUNTER
Patient was left detailed message. I will also send mychart.    If patient calls Transfer to triage dept [phone]

## 2021-12-14 NOTE — TELEPHONE ENCOUNTER
Iron supplementation sent to pharmacy. Would have her follow-up with me in approximately 3 months to repeat lab work and to see whether she still needs supplementation. [facility] Iron studies and blood counts ordered. occasionally

## 2021-12-14 NOTE — TELEPHONE ENCOUNTER
Pt's last Hgb was 10.7 on 10/05. At that time she was referred to GI. From GI notes from yesterday's visit, \"On review of labs does have anemia. She states she has heavy periods-- 2-5 days. Had anemia during pregnancy but now low again.    She got braces i

## 2021-12-16 NOTE — TELEPHONE ENCOUNTER
Reviewed with patient the prescription sent to the pharmacy.  Reviewed with patient the doctor recommendation to repeat the iron and blood counts at [address] [place] [address] and follow up in 3 months to evaluate on progress and if continued supplementation is necessa

## 2022-01-21 ENCOUNTER — TELEPHONE (OUTPATIENT)
Dept: GASTROENTEROLOGY | Facility: CLINIC | Age: 32
End: 2022-01-21

## 2022-01-21 NOTE — TELEPHONE ENCOUNTER
GI Schedulers--    Please send to [facility] prep instructions in Indonesian per her request.     Scheduled for colonoscopy/egd next Friday, 01/28/2022 with Dr. Herve Barrientos. Thank you!

## 2022-01-21 NOTE — TELEPHONE ENCOUNTER
Scheduled for colonoscopy/egd with Dr. Leydi You next Friday, 01/28/2022. Spoke with Domonique Felton to clarify questions/concerns in relation to procedure. Utilized language line solutions; Jana Earl #[id number]. Needs instructions in Bulgarian.  Did not receive anyt

## 2022-01-24 ENCOUNTER — TELEPHONE (OUTPATIENT)
Dept: GASTROENTEROLOGY | Facility: CLINIC | Age: 32
End: 2022-01-24

## 2022-01-24 NOTE — TELEPHONE ENCOUNTER
Roly Corado utilizing [facility] ; Jose Brooks #[id number]. Currently taking motrin for migraines. Instructed her to HOLD motrin until s/p colon/egd. Ensured she can take tylenol as needed for pain control.      IF tylenol is not helping and

## 2022-01-24 NOTE — TELEPHONE ENCOUNTER
Patient has cln/egd scheduled 1/28 and asking if she can take medication for her pain, since instructions have restrictions on medications. Please call at [phone].

## 2022-01-25 ENCOUNTER — LAB ENCOUNTER (OUTPATIENT)
Dept: LAB | Age: 32
End: 2022-01-25
Attending: INTERNAL MEDICINE
Payer: MEDICAID

## 2022-01-25 DIAGNOSIS — Z01.812 ENCOUNTER FOR PREOPERATIVE SCREENING LABORATORY TESTING FOR COVID-19 VIRUS: ICD-10-CM

## 2022-01-25 DIAGNOSIS — Z20.822 ENCOUNTER FOR PREOPERATIVE SCREENING LABORATORY TESTING FOR COVID-19 VIRUS: ICD-10-CM

## 2022-01-26 ENCOUNTER — TELEPHONE (OUTPATIENT)
Dept: FAMILY MEDICINE CLINIC | Facility: CLINIC | Age: 32
End: 2022-01-26

## 2022-01-26 ENCOUNTER — TELEPHONE (OUTPATIENT)
Dept: GASTROENTEROLOGY | Facility: CLINIC | Age: 32
End: 2022-01-26

## 2022-01-26 DIAGNOSIS — K57.32 DIVERTICULITIS OF LARGE INTESTINE WITHOUT PERFORATION OR ABSCESS, UNSPECIFIED BLEEDING STATUS: Primary | ICD-10-CM

## 2022-01-26 DIAGNOSIS — D50.9 IRON DEFICIENCY ANEMIA, UNSPECIFIED IRON DEFICIENCY ANEMIA TYPE: ICD-10-CM

## 2022-01-26 DIAGNOSIS — R79.89 HYPOURICEMIA: ICD-10-CM

## 2022-01-26 LAB — SARS-COV-2 RNA RESP QL NAA+PROBE: DETECTED

## 2022-01-26 NOTE — TELEPHONE ENCOUNTER
----- Message from Kat Joseph MD sent at 1/26/2022  1:59 PM CST -----  COVID positive-- GI staff please notify patient -- per protocol please see if symptoms reschedule 6 weeks out if no symptoms 4 weeks out  Quarantine for 5 days and then make sure wear

## 2022-01-26 NOTE — TELEPHONE ENCOUNTER
Removed from epic appointment desk and surgical cancel request submitted. Contacted Tameka utilizing language line solutions to provide (+) results and MD instructions; [facility] #[phone]. Disclosed (+) pre-procedural results.      Denies covid related sy

## 2022-01-26 NOTE — TELEPHONE ENCOUNTER
With  ID # [id number]    Patient states she had a positive COVID test 1/15/22  Patient states she tested positive for COVID by rapid test 1/14/22. Patient took rapid test last Wednesday and it was negative.    Patient asking how she could h

## 2022-01-26 NOTE — TELEPHONE ENCOUNTER
GI Schedulers--    Please assist with rescheduling colonoscopy/egd at least 4 weeks due to (+) covid test.     Removed from [facility] appointment desk and surgical cancel request submitted.      Disclosed results/recommendations with [facility] and aware our office w

## 2022-01-31 NOTE — TELEPHONE ENCOUNTER
Rescheduled for: Colonoscopy [address]   Provider Name: Dr Mendiola Batch   Date: From 1/28/2021   To 03/03/2022  Location: [address] [place] [address] 1  Sedation: MAC    Time: from 10 am        To 1000 (pt is aware to arrive at 0900  Prep: split trilyte revised prept o be sent via

## 2022-03-03 ENCOUNTER — TELEPHONE (OUTPATIENT)
Dept: GASTROENTEROLOGY | Facility: CLINIC | Age: 32
End: 2022-03-03

## 2022-04-14 ENCOUNTER — TELEPHONE (OUTPATIENT)
Dept: GASTROENTEROLOGY | Facility: CLINIC | Age: 32
End: 2022-04-14

## 2022-04-14 NOTE — TELEPHONE ENCOUNTER
Patient outreach message received:    Per Dr. Jyothi Marie, patient will need to reschedule when she gets her new insurance. Follow up phone call recall placed in patient outreach for 1 month.

## 2022-04-14 NOTE — TELEPHONE ENCOUNTER
# [id number]  Per , patient's phone is busy x 2 . If patient calls back, please update insurance and send to triage. Thank you.

## 2022-04-15 NOTE — TELEPHONE ENCOUNTER
[facility] office staff--    Please assist with obtaining updated insurance information from [facility]. As of 03/03/2022-    [address] informed insurance terminated 02/28/2022. Need updated insurance if she would like to plan / reschedule colon/egd. Thank you!

## 2022-04-18 ENCOUNTER — LAB ENCOUNTER (OUTPATIENT)
Dept: LAB | Age: 32
End: 2022-04-18
Attending: FAMILY MEDICINE
Payer: MEDICAID

## 2022-04-18 ENCOUNTER — OFFICE VISIT (OUTPATIENT)
Dept: FAMILY MEDICINE CLINIC | Facility: CLINIC | Age: 32
End: 2022-04-18
Payer: MEDICAID

## 2022-04-18 VITALS
TEMPERATURE: 98 F | WEIGHT: 139 LBS | BODY MASS INDEX: 23.73 KG/M2 | SYSTOLIC BLOOD PRESSURE: 104 MMHG | HEART RATE: 75 BPM | HEIGHT: 64 IN | DIASTOLIC BLOOD PRESSURE: 64 MMHG

## 2022-04-18 DIAGNOSIS — D50.9 IRON DEFICIENCY ANEMIA, UNSPECIFIED IRON DEFICIENCY ANEMIA TYPE: ICD-10-CM

## 2022-04-18 DIAGNOSIS — D64.9 ANEMIA, UNSPECIFIED TYPE: ICD-10-CM

## 2022-04-18 DIAGNOSIS — G44.209 TENSION HEADACHE: Primary | ICD-10-CM

## 2022-04-18 DIAGNOSIS — Z11.3 SCREEN FOR STD (SEXUALLY TRANSMITTED DISEASE): ICD-10-CM

## 2022-04-18 LAB
BASOPHILS # BLD AUTO: 0.04 X10(3) UL (ref 0–0.2)
BASOPHILS NFR BLD AUTO: 0.6 %
DEPRECATED RDW RBC AUTO: 41.1 FL (ref 35.1–46.3)
EOSINOPHIL # BLD AUTO: 0.04 X10(3) UL (ref 0–0.7)
EOSINOPHIL NFR BLD AUTO: 0.6 %
ERYTHROCYTE [DISTWIDTH] IN BLOOD BY AUTOMATED COUNT: 12.7 % (ref 11–15)
HCT VFR BLD AUTO: 33.2 %
HGB BLD-MCNC: 10.4 G/DL
IMM GRANULOCYTES # BLD AUTO: 0.01 X10(3) UL (ref 0–1)
IMM GRANULOCYTES NFR BLD: 0.1 %
IRON SATN MFR SERPL: 9 %
IRON SERPL-MCNC: 40 UG/DL
LYMPHOCYTES # BLD AUTO: 2.26 X10(3) UL (ref 1–4)
LYMPHOCYTES NFR BLD AUTO: 33.2 %
MCH RBC QN AUTO: 28.2 PG (ref 26–34)
MCHC RBC AUTO-ENTMCNC: 31.3 G/DL (ref 31–37)
MCV RBC AUTO: 90 FL
MONOCYTES # BLD AUTO: 0.36 X10(3) UL (ref 0.1–1)
MONOCYTES NFR BLD AUTO: 5.3 %
NEUTROPHILS # BLD AUTO: 4.09 X10 (3) UL (ref 1.5–7.7)
NEUTROPHILS # BLD AUTO: 4.09 X10(3) UL (ref 1.5–7.7)
NEUTROPHILS NFR BLD AUTO: 60.2 %
PLATELET # BLD AUTO: 315 10(3)UL (ref 150–450)
RBC # BLD AUTO: 3.69 X10(6)UL
TIBC SERPL-MCNC: 457 UG/DL (ref 240–450)
TRANSFERRIN SERPL-MCNC: 307 MG/DL (ref 200–360)
WBC # BLD AUTO: 6.8 X10(3) UL (ref 4–11)

## 2022-04-18 PROCEDURE — 36415 COLL VENOUS BLD VENIPUNCTURE: CPT

## 2022-04-18 PROCEDURE — 99214 OFFICE O/P EST MOD 30 MIN: CPT | Performed by: FAMILY MEDICINE

## 2022-04-18 PROCEDURE — 84466 ASSAY OF TRANSFERRIN: CPT

## 2022-04-18 PROCEDURE — 86780 TREPONEMA PALLIDUM: CPT

## 2022-04-18 PROCEDURE — 83540 ASSAY OF IRON: CPT

## 2022-04-18 PROCEDURE — 3078F DIAST BP <80 MM HG: CPT | Performed by: FAMILY MEDICINE

## 2022-04-18 PROCEDURE — 3008F BODY MASS INDEX DOCD: CPT | Performed by: FAMILY MEDICINE

## 2022-04-18 PROCEDURE — 87389 HIV-1 AG W/HIV-1&-2 AB AG IA: CPT

## 2022-04-18 PROCEDURE — 3074F SYST BP LT 130 MM HG: CPT | Performed by: FAMILY MEDICINE

## 2022-04-18 PROCEDURE — 87591 N.GONORRHOEAE DNA AMP PROB: CPT

## 2022-04-18 PROCEDURE — 85025 COMPLETE CBC W/AUTO DIFF WBC: CPT

## 2022-04-18 PROCEDURE — 87491 CHLMYD TRACH DNA AMP PROBE: CPT

## 2022-04-18 RX ORDER — CYCLOBENZAPRINE HCL 10 MG
10 TABLET ORAL 2 TIMES DAILY PRN
Qty: 30 TABLET | Refills: 1 | Status: SHIPPED | OUTPATIENT
Start: 2022-04-18

## 2022-04-19 LAB
C TRACH DNA SPEC QL NAA+PROBE: NEGATIVE
N GONORRHOEA DNA SPEC QL NAA+PROBE: NEGATIVE

## 2022-04-20 LAB — T PALLIDUM AB SER QL: NEGATIVE

## 2022-04-20 NOTE — TELEPHONE ENCOUNTER
Pt is returning the call with . She states the current insurance on file is correct and I verified the ID number.  Please follow up

## 2022-04-20 NOTE — TELEPHONE ENCOUNTER
Spoke with Thai from insurance verification. Provided patient name / Sundeep Fend / rationale for verification purposes. Confirmed currently coverage on system is active as or 04/01/2022. Forwarded to schedulers to assist with scheduling.

## 2022-06-03 DIAGNOSIS — G44.209 TENSION HEADACHE: ICD-10-CM

## 2022-06-03 NOTE — TELEPHONE ENCOUNTER
Routed to Dr Myron Downs per protocol as RN not able to refill NSAIDS products, pls advise, thanks.         Refill passed per [place] [facility] protocol   Requested Prescriptions   Pending Prescriptions Disp Refills    DICLOFENAC 50 MG Oral Tab EC [Pharmacy Med Name: DICLOFENAC SODIUM 50MG DR TABLETS] 60 tablet 1     Sig: TAKE 1 TABLET BY MOUTH THREE TIMES DAILY AS NEEDED WITH FOOD        Non-Narcotic Pain Medication Protocol Passed - 6/3/2022  5:06 PM        Passed - Appointment in past 6 or next 3 months

## 2022-06-11 ENCOUNTER — NURSE TRIAGE (OUTPATIENT)
Dept: FAMILY MEDICINE CLINIC | Facility: CLINIC | Age: 32
End: 2022-06-11

## 2022-06-11 DIAGNOSIS — Z20.822 ENCOUNTER FOR LABORATORY TESTING FOR COVID-19 VIRUS: Primary | ICD-10-CM

## 2022-07-05 ENCOUNTER — TELEPHONE (OUTPATIENT)
Dept: GASTROENTEROLOGY | Facility: CLINIC | Age: 32
End: 2022-07-05

## 2022-07-05 NOTE — TELEPHONE ENCOUNTER
pt needs to resched her CLN proc as she is in [facility]. Pt wants to know if her proc can be changed to the end of July?

## 2022-07-19 ENCOUNTER — OFFICE VISIT (OUTPATIENT)
Dept: FAMILY MEDICINE CLINIC | Facility: CLINIC | Age: 32
End: 2022-07-19
Payer: MEDICAID

## 2022-07-19 ENCOUNTER — LAB ENCOUNTER (OUTPATIENT)
Dept: LAB | Age: 32
End: 2022-07-19
Attending: FAMILY MEDICINE
Payer: MEDICAID

## 2022-07-19 VITALS
DIASTOLIC BLOOD PRESSURE: 70 MMHG | WEIGHT: 134 LBS | HEART RATE: 67 BPM | SYSTOLIC BLOOD PRESSURE: 105 MMHG | BODY MASS INDEX: 22.88 KG/M2 | TEMPERATURE: 97 F | HEIGHT: 64 IN

## 2022-07-19 DIAGNOSIS — D50.9 IRON DEFICIENCY ANEMIA, UNSPECIFIED IRON DEFICIENCY ANEMIA TYPE: Primary | ICD-10-CM

## 2022-07-19 DIAGNOSIS — D50.9 IRON DEFICIENCY ANEMIA, UNSPECIFIED IRON DEFICIENCY ANEMIA TYPE: ICD-10-CM

## 2022-07-19 DIAGNOSIS — G89.29 CHRONIC BILATERAL LOW BACK PAIN WITHOUT SCIATICA: ICD-10-CM

## 2022-07-19 DIAGNOSIS — M54.50 CHRONIC BILATERAL LOW BACK PAIN WITHOUT SCIATICA: ICD-10-CM

## 2022-07-19 LAB
BASOPHILS # BLD AUTO: 0.02 X10(3) UL (ref 0–0.2)
BASOPHILS NFR BLD AUTO: 0.3 %
DEPRECATED RDW RBC AUTO: 43.6 FL (ref 35.1–46.3)
EOSINOPHIL # BLD AUTO: 0.02 X10(3) UL (ref 0–0.7)
EOSINOPHIL NFR BLD AUTO: 0.3 %
ERYTHROCYTE [DISTWIDTH] IN BLOOD BY AUTOMATED COUNT: 13.3 % (ref 11–15)
HCT VFR BLD AUTO: 34.4 %
HGB BLD-MCNC: 11 G/DL
IMM GRANULOCYTES # BLD AUTO: 0.01 X10(3) UL (ref 0–1)
IMM GRANULOCYTES NFR BLD: 0.2 %
IRON SATN MFR SERPL: 8 %
IRON SERPL-MCNC: 28 UG/DL
LYMPHOCYTES # BLD AUTO: 2.19 X10(3) UL (ref 1–4)
LYMPHOCYTES NFR BLD AUTO: 37.4 %
MCH RBC QN AUTO: 28.9 PG (ref 26–34)
MCHC RBC AUTO-ENTMCNC: 32 G/DL (ref 31–37)
MCV RBC AUTO: 90.3 FL
MONOCYTES # BLD AUTO: 0.21 X10(3) UL (ref 0.1–1)
MONOCYTES NFR BLD AUTO: 3.6 %
NEUTROPHILS # BLD AUTO: 3.4 X10 (3) UL (ref 1.5–7.7)
NEUTROPHILS # BLD AUTO: 3.4 X10(3) UL (ref 1.5–7.7)
NEUTROPHILS NFR BLD AUTO: 58.2 %
PLATELET # BLD AUTO: 253 10(3)UL (ref 150–450)
RBC # BLD AUTO: 3.81 X10(6)UL
TIBC SERPL-MCNC: 349 UG/DL (ref 240–450)
TRANSFERRIN SERPL-MCNC: 234 MG/DL (ref 200–360)
WBC # BLD AUTO: 5.9 X10(3) UL (ref 4–11)

## 2022-07-19 PROCEDURE — 36415 COLL VENOUS BLD VENIPUNCTURE: CPT

## 2022-07-19 PROCEDURE — 3008F BODY MASS INDEX DOCD: CPT | Performed by: FAMILY MEDICINE

## 2022-07-19 PROCEDURE — 3078F DIAST BP <80 MM HG: CPT | Performed by: FAMILY MEDICINE

## 2022-07-19 PROCEDURE — 99213 OFFICE O/P EST LOW 20 MIN: CPT | Performed by: FAMILY MEDICINE

## 2022-07-19 PROCEDURE — 85025 COMPLETE CBC W/AUTO DIFF WBC: CPT

## 2022-07-19 PROCEDURE — 84466 ASSAY OF TRANSFERRIN: CPT

## 2022-07-19 PROCEDURE — 83540 ASSAY OF IRON: CPT

## 2022-07-19 PROCEDURE — 3074F SYST BP LT 130 MM HG: CPT | Performed by: FAMILY MEDICINE

## 2022-07-19 RX ORDER — FERROUS SULFATE 325(65) MG
325 TABLET ORAL
Qty: 90 TABLET | Refills: 0 | Status: SHIPPED | OUTPATIENT
Start: 2022-07-19

## 2022-09-05 ENCOUNTER — HOSPITAL ENCOUNTER (OUTPATIENT)
Age: 32
Discharge: HOME | End: 2022-09-05
Payer: MEDICAID

## 2022-09-05 VITALS
SYSTOLIC BLOOD PRESSURE: 120 MMHG | TEMPERATURE: 99 F | HEART RATE: 119 BPM | OXYGEN SATURATION: 99 % | DIASTOLIC BLOOD PRESSURE: 70 MMHG | RESPIRATION RATE: 18 BRPM

## 2022-09-05 DIAGNOSIS — J06.9 VIRAL UPPER RESPIRATORY TRACT INFECTION: Primary | ICD-10-CM

## 2022-09-05 LAB — SARS-COV-2 RNA RESP QL NAA+PROBE: NOT DETECTED

## 2022-09-05 PROCEDURE — 99212 OFFICE O/P EST SF 10 MIN: CPT

## 2022-09-05 PROCEDURE — 99213 OFFICE O/P EST LOW 20 MIN: CPT

## 2022-09-05 NOTE — ED INITIAL ASSESSMENT (HPI)
C/o bilateral ear pain, arm pain that began on Saturday. Now c/o feeling hot and having slight cough chest pain and back pain. Runny nose and sore throat.

## 2022-09-15 ENCOUNTER — NURSE TRIAGE (OUTPATIENT)
Dept: FAMILY MEDICINE CLINIC | Facility: CLINIC | Age: 32
End: 2022-09-15

## 2022-09-16 ENCOUNTER — OFFICE VISIT (OUTPATIENT)
Dept: FAMILY MEDICINE CLINIC | Facility: CLINIC | Age: 32
End: 2022-09-16
Payer: MEDICAID

## 2022-09-16 VITALS
WEIGHT: 134 LBS | BODY MASS INDEX: 22.88 KG/M2 | DIASTOLIC BLOOD PRESSURE: 76 MMHG | HEIGHT: 64 IN | HEART RATE: 85 BPM | SYSTOLIC BLOOD PRESSURE: 120 MMHG

## 2022-09-16 DIAGNOSIS — H10.44 VERNAL CONJUNCTIVITIS OF BOTH EYES: Primary | ICD-10-CM

## 2022-09-16 PROCEDURE — 3008F BODY MASS INDEX DOCD: CPT | Performed by: FAMILY MEDICINE

## 2022-09-16 PROCEDURE — 3078F DIAST BP <80 MM HG: CPT | Performed by: FAMILY MEDICINE

## 2022-09-16 PROCEDURE — 3074F SYST BP LT 130 MM HG: CPT | Performed by: FAMILY MEDICINE

## 2022-09-16 PROCEDURE — 99213 OFFICE O/P EST LOW 20 MIN: CPT | Performed by: FAMILY MEDICINE

## 2022-09-16 RX ORDER — AZELASTINE HYDROCHLORIDE 0.5 MG/ML
1 SOLUTION/ DROPS OPHTHALMIC 2 TIMES DAILY
Qty: 1 EACH | Refills: 0 | Status: SHIPPED | OUTPATIENT
Start: 2022-09-16 | End: 2023-01-14

## 2022-09-16 NOTE — PROGRESS NOTES
Blood pressure 120/76, pulse 85, height 5' 4\" (1.626 m), weight 134 lb (60.8 kg), last menstrual period 07/16/2022, currently breastfeeding. Patient presents today complaining of bilateral eye itching and discharge for the past 10 days. Daughters were sick. No contact lens use. No nasal congestion or sneezing.     Objective PERRLA EOMI    Eyelid flipped no foreign body noted    Assessment conjunctivitis improving patient still with some residual itching    Plan [facility] follow-up if no improvement

## 2022-10-04 ENCOUNTER — OFFICE VISIT (OUTPATIENT)
Dept: FAMILY MEDICINE CLINIC | Facility: CLINIC | Age: 32
End: 2022-10-04
Payer: MEDICAID

## 2022-10-04 VITALS
WEIGHT: 136 LBS | HEART RATE: 75 BPM | SYSTOLIC BLOOD PRESSURE: 115 MMHG | DIASTOLIC BLOOD PRESSURE: 76 MMHG | HEIGHT: 64 IN | BODY MASS INDEX: 23.22 KG/M2

## 2022-10-04 DIAGNOSIS — J30.1 SEASONAL ALLERGIC RHINITIS DUE TO POLLEN: Primary | ICD-10-CM

## 2022-10-04 PROCEDURE — 3074F SYST BP LT 130 MM HG: CPT | Performed by: NURSE PRACTITIONER

## 2022-10-04 PROCEDURE — 99213 OFFICE O/P EST LOW 20 MIN: CPT | Performed by: NURSE PRACTITIONER

## 2022-10-04 PROCEDURE — 3078F DIAST BP <80 MM HG: CPT | Performed by: NURSE PRACTITIONER

## 2022-10-04 PROCEDURE — 3008F BODY MASS INDEX DOCD: CPT | Performed by: NURSE PRACTITIONER

## 2022-10-18 DIAGNOSIS — D50.9 IRON DEFICIENCY ANEMIA, UNSPECIFIED IRON DEFICIENCY ANEMIA TYPE: ICD-10-CM

## 2022-10-18 NOTE — TELEPHONE ENCOUNTER
Please Review. Protocol Failed or has no protocol.        Requested Prescriptions   Pending Prescriptions Disp Refills    FEROSUL 325 (65 Fe) MG Oral Tab [Pharmacy Med Name: FERROUS SULFATE 325MG (5GR) TABS] 90 tablet 0     Sig: TAKE 1 TABLET BY MOUTH DAILY WITH BREAKFAST        There is no refill protocol information for this order         Recent Outpatient Visits              2 weeks ago Seasonal allergic rhinitis due to pollen    [address] APRN    Office Visit    1 month ago Vernal conjunctivitis of both eyes    [address] [facility] [place],     Office Visit    3 months ago Iron deficiency anemia, unspecified iron deficiency anemia type    [address] MD    Office Visit    6 months ago Tension headache    Bernardo Reyna MD    Office Visit    10 months ago Diverticulitis of large intestine without perforation or abscess without bleeding    Jessica Warner MD    Office Visit          Future Appointments         Provider Department Appt Notes    In 3 weeks RASHEL, PROCEDURE [address] GI PROCEDURE EGD & Colon @ [facility]

## 2022-10-20 RX ORDER — FERROUS SULFATE 325(65) MG
1 TABLET ORAL
Qty: 90 TABLET | Refills: 0 | Status: SHIPPED | OUTPATIENT
Start: 2022-10-20

## 2022-11-06 ENCOUNTER — LAB ENCOUNTER (OUTPATIENT)
Dept: LAB | Facility: HOSPITAL | Age: 32
End: 2022-11-06
Attending: INTERNAL MEDICINE
Payer: MEDICAID

## 2022-11-06 DIAGNOSIS — Z01.818 PRE-OP TESTING: ICD-10-CM

## 2022-11-07 ENCOUNTER — NURSE TRIAGE (OUTPATIENT)
Dept: FAMILY MEDICINE CLINIC | Facility: CLINIC | Age: 32
End: 2022-11-07

## 2022-11-07 LAB — SARS-COV-2 RNA RESP QL NAA+PROBE: NOT DETECTED

## 2022-11-17 ENCOUNTER — TELEMEDICINE (OUTPATIENT)
Dept: FAMILY MEDICINE CLINIC | Facility: CLINIC | Age: 32
End: 2022-11-17
Payer: MEDICAID

## 2022-11-17 DIAGNOSIS — H93.11 TINNITUS OF RIGHT EAR: Primary | ICD-10-CM

## 2022-11-17 DIAGNOSIS — R73.03 PREDIABETES: ICD-10-CM

## 2022-11-17 DIAGNOSIS — D50.9 IRON DEFICIENCY ANEMIA, UNSPECIFIED IRON DEFICIENCY ANEMIA TYPE: ICD-10-CM

## 2022-11-17 PROCEDURE — 99213 OFFICE O/P EST LOW 20 MIN: CPT | Performed by: FAMILY MEDICINE

## 2022-11-17 RX ORDER — FERROUS SULFATE 325(65) MG
1 TABLET ORAL
Qty: 90 TABLET | Refills: 2 | Status: SHIPPED | OUTPATIENT
Start: 2022-11-17

## 2022-11-17 RX ORDER — LEVOCETIRIZINE DIHYDROCHLORIDE 5 MG/1
5 TABLET, FILM COATED ORAL EVERY EVENING
Qty: 30 TABLET | Refills: 0 | Status: SHIPPED | OUTPATIENT
Start: 2022-11-17

## 2022-11-17 NOTE — PROGRESS NOTES
Virtual Telephone Check-In    Kevin Lozoya verbally consents to a Virtual/Telephone Check-In service on 11/17/22. Patient understands and accepts financial responsibility for any deductible, co-insurance and/or co-pays associated with this service. Duration of the service: 15 minutes  This telemedicine visit was conducted using live audio and video. Summary of topics discussed:     Patient calling complaining of tinnitus affecting mainly her right ear. States that she had an ear infection/otalgia that started about 1 to 2 months ago and since then has experienced ear ringing sensation. Has tried over-the-counter antihistamines and Flonase with minimal relief. No obvious aggravating or relieving symptoms. Has a history of iron deficiency anemia and takes her iron supplementation daily but not always with vitamin C or orange juice for absorption. Patient concerned because she has been losing weight and would like to see a dietitian. Physical Exam:  General: alert, speaking in full sentences, no acute distress  Lungs: respirations sound unlabored, no audible wheezing with speaking. Neurologic: alert, oriented x3    Assessment and plan:    1. Tinnitus of right ear  -Can trial Xyzal.  Patient tried Flonase with no relief. If her symptoms do not improve can see ENT. Symptoms have been persistent for 1 to 2 months. - levocetirizine 5 MG Oral Tab; Take 1 tablet (5 mg total) by mouth every evening. Dispense: 30 tablet; Refill: 0  - ENT - INTERNAL    2. Prediabetes  -Patient is prediabetic but has been losing weight and would like a dietitian referral.  She is primarily Kinyarwanda-speaking  - [address], DIET (INTERNAL)    3. Iron deficiency anemia, unspecified iron deficiency anemia type  Advised patient to follow-up for annual physical exam so we can order lab work to see what what her blood counts look like. - Ferrous Sulfate (FEROSUL) 325 (65 Fe) MG Oral Tab;  Take 1 tablet (325 mg total) by mouth daily with breakfast.  Dispense: 90 tablet; Refill: 2      Advised to call back clinic if no improvement in symptoms. Red flags discussed to go to [facility].      Seferino Ghosh MD
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
## 2023-01-03 ENCOUNTER — OFFICE VISIT (OUTPATIENT)
Dept: FAMILY MEDICINE CLINIC | Facility: CLINIC | Age: 33
End: 2023-01-03
Payer: MEDICAID

## 2023-01-03 VITALS
DIASTOLIC BLOOD PRESSURE: 67 MMHG | TEMPERATURE: 98 F | SYSTOLIC BLOOD PRESSURE: 105 MMHG | HEIGHT: 64 IN | BODY MASS INDEX: 23.05 KG/M2 | HEART RATE: 67 BPM | WEIGHT: 135 LBS

## 2023-01-03 DIAGNOSIS — Z00.00 ENCOUNTER FOR ANNUAL HEALTH EXAMINATION: Primary | ICD-10-CM

## 2023-01-03 DIAGNOSIS — R73.03 PREDIABETES: ICD-10-CM

## 2023-01-03 DIAGNOSIS — H53.8 BLURRED VISION: ICD-10-CM

## 2023-01-03 PROCEDURE — 99395 PREV VISIT EST AGE 18-39: CPT | Performed by: FAMILY MEDICINE

## 2023-01-03 PROCEDURE — 3008F BODY MASS INDEX DOCD: CPT | Performed by: FAMILY MEDICINE

## 2023-01-03 PROCEDURE — 3078F DIAST BP <80 MM HG: CPT | Performed by: FAMILY MEDICINE

## 2023-01-03 PROCEDURE — 3074F SYST BP LT 130 MM HG: CPT | Performed by: FAMILY MEDICINE

## 2023-01-04 ENCOUNTER — LAB ENCOUNTER (OUTPATIENT)
Dept: LAB | Age: 33
End: 2023-01-04
Attending: FAMILY MEDICINE
Payer: MEDICAID

## 2023-01-04 DIAGNOSIS — Z00.00 ENCOUNTER FOR ANNUAL HEALTH EXAMINATION: ICD-10-CM

## 2023-01-04 LAB
ALBUMIN SERPL-MCNC: 3.8 G/DL (ref 3.4–5)
ALBUMIN/GLOB SERPL: 1.1 {RATIO} (ref 1–2)
ALP LIVER SERPL-CCNC: 54 U/L
ALT SERPL-CCNC: 20 U/L
ANION GAP SERPL CALC-SCNC: 4 MMOL/L (ref 0–18)
AST SERPL-CCNC: 12 U/L (ref 15–37)
BASOPHILS # BLD AUTO: 0.02 X10(3) UL (ref 0–0.2)
BASOPHILS NFR BLD AUTO: 0.3 %
BILIRUB SERPL-MCNC: 0.3 MG/DL (ref 0.1–2)
BUN BLD-MCNC: 16 MG/DL (ref 7–18)
BUN/CREAT SERPL: 25.8 (ref 10–20)
CALCIUM BLD-MCNC: 9 MG/DL (ref 8.5–10.1)
CHLORIDE SERPL-SCNC: 107 MMOL/L (ref 98–112)
CHOLEST SERPL-MCNC: 149 MG/DL (ref ?–200)
CO2 SERPL-SCNC: 27 MMOL/L (ref 21–32)
CREAT BLD-MCNC: 0.62 MG/DL
DEPRECATED RDW RBC AUTO: 43.8 FL (ref 35.1–46.3)
EOSINOPHIL # BLD AUTO: 0.03 X10(3) UL (ref 0–0.7)
EOSINOPHIL NFR BLD AUTO: 0.4 %
ERYTHROCYTE [DISTWIDTH] IN BLOOD BY AUTOMATED COUNT: 13.3 % (ref 11–15)
EST. AVERAGE GLUCOSE BLD GHB EST-MCNC: 100 MG/DL (ref 68–126)
FASTING PATIENT LIPID ANSWER: YES
FASTING STATUS PATIENT QL REPORTED: YES
GFR SERPLBLD BASED ON 1.73 SQ M-ARVRAT: 121 ML/MIN/1.73M2 (ref 60–?)
GLOBULIN PLAS-MCNC: 3.6 G/DL (ref 2.8–4.4)
GLUCOSE BLD-MCNC: 90 MG/DL (ref 70–99)
HBA1C MFR BLD: 5.1 % (ref ?–5.7)
HCT VFR BLD AUTO: 38.4 %
HDLC SERPL-MCNC: 47 MG/DL (ref 40–59)
HGB BLD-MCNC: 12.5 G/DL
IMM GRANULOCYTES # BLD AUTO: 0.02 X10(3) UL (ref 0–1)
IMM GRANULOCYTES NFR BLD: 0.3 %
LDLC SERPL CALC-MCNC: 79 MG/DL (ref ?–100)
LYMPHOCYTES # BLD AUTO: 1.87 X10(3) UL (ref 1–4)
LYMPHOCYTES NFR BLD AUTO: 25.7 %
MCH RBC QN AUTO: 29.2 PG (ref 26–34)
MCHC RBC AUTO-ENTMCNC: 32.6 G/DL (ref 31–37)
MCV RBC AUTO: 89.7 FL
MONOCYTES # BLD AUTO: 0.25 X10(3) UL (ref 0.1–1)
MONOCYTES NFR BLD AUTO: 3.4 %
NEUTROPHILS # BLD AUTO: 5.09 X10 (3) UL (ref 1.5–7.7)
NEUTROPHILS # BLD AUTO: 5.09 X10(3) UL (ref 1.5–7.7)
NEUTROPHILS NFR BLD AUTO: 69.9 %
NONHDLC SERPL-MCNC: 102 MG/DL (ref ?–130)
OSMOLALITY SERPL CALC.SUM OF ELEC: 287 MOSM/KG (ref 275–295)
PLATELET # BLD AUTO: 321 10(3)UL (ref 150–450)
POTASSIUM SERPL-SCNC: 4.2 MMOL/L (ref 3.5–5.1)
PROT SERPL-MCNC: 7.4 G/DL (ref 6.4–8.2)
RBC # BLD AUTO: 4.28 X10(6)UL
SODIUM SERPL-SCNC: 138 MMOL/L (ref 136–145)
TRIGL SERPL-MCNC: 133 MG/DL (ref 30–149)
TSI SER-ACNC: 0.9 MIU/ML (ref 0.36–3.74)
VLDLC SERPL CALC-MCNC: 21 MG/DL (ref 0–30)
WBC # BLD AUTO: 7.3 X10(3) UL (ref 4–11)

## 2023-01-04 PROCEDURE — 83036 HEMOGLOBIN GLYCOSYLATED A1C: CPT

## 2023-01-04 PROCEDURE — 36415 COLL VENOUS BLD VENIPUNCTURE: CPT

## 2023-01-04 PROCEDURE — 80053 COMPREHEN METABOLIC PANEL: CPT

## 2023-01-04 PROCEDURE — 80061 LIPID PANEL: CPT

## 2023-01-04 PROCEDURE — 84443 ASSAY THYROID STIM HORMONE: CPT

## 2023-01-04 PROCEDURE — 85025 COMPLETE CBC W/AUTO DIFF WBC: CPT

## 2023-02-16 ENCOUNTER — OFFICE VISIT (OUTPATIENT)
Dept: FAMILY MEDICINE CLINIC | Facility: CLINIC | Age: 33
End: 2023-02-16

## 2023-02-16 VITALS
WEIGHT: 136 LBS | DIASTOLIC BLOOD PRESSURE: 74 MMHG | SYSTOLIC BLOOD PRESSURE: 111 MMHG | HEIGHT: 64 IN | BODY MASS INDEX: 23.22 KG/M2 | HEART RATE: 78 BPM

## 2023-02-16 DIAGNOSIS — M54.2 NECK PAIN: ICD-10-CM

## 2023-02-16 DIAGNOSIS — G44.209 ACUTE NON INTRACTABLE TENSION-TYPE HEADACHE: Primary | ICD-10-CM

## 2023-02-16 PROCEDURE — 3008F BODY MASS INDEX DOCD: CPT | Performed by: NURSE PRACTITIONER

## 2023-02-16 PROCEDURE — 99214 OFFICE O/P EST MOD 30 MIN: CPT | Performed by: NURSE PRACTITIONER

## 2023-02-16 PROCEDURE — 3074F SYST BP LT 130 MM HG: CPT | Performed by: NURSE PRACTITIONER

## 2023-02-16 PROCEDURE — 3078F DIAST BP <80 MM HG: CPT | Performed by: NURSE PRACTITIONER

## 2023-02-16 RX ORDER — CYCLOBENZAPRINE HCL 5 MG
5 TABLET ORAL 3 TIMES DAILY PRN
Qty: 30 TABLET | Refills: 0 | Status: SHIPPED | OUTPATIENT
Start: 2023-02-16

## 2023-02-16 RX ORDER — BUTALBITAL, ACETAMINOPHEN AND CAFFEINE 50; 325; 40 MG/1; MG/1; MG/1
1 TABLET ORAL
COMMUNITY
Start: 2023-02-15 | End: 2023-02-19

## 2023-02-16 NOTE — ASSESSMENT & PLAN NOTE
Discussed neck exercises aggie when under stress  Ice 20 min 3-4 times per day  Flexeril 5 mg I po three times per day as needed-will cause tiredness, use w caution  May use fioricet for pain  Supportive care discussed to help alleviate symptoms  Please call if symptoms worsen or are not resolving.

## 2023-05-02 ENCOUNTER — OFFICE VISIT (OUTPATIENT)
Dept: NEUROLOGY | Facility: CLINIC | Age: 33
End: 2023-05-02
Payer: MEDICAID

## 2023-05-02 VITALS — WEIGHT: 136 LBS | BODY MASS INDEX: 23.22 KG/M2 | HEIGHT: 64 IN

## 2023-05-02 DIAGNOSIS — R20.2 PARESTHESIA: ICD-10-CM

## 2023-05-02 DIAGNOSIS — G43.009 MIGRAINE WITHOUT AURA AND WITHOUT STATUS MIGRAINOSUS, NOT INTRACTABLE: Primary | ICD-10-CM

## 2023-05-02 PROCEDURE — 99204 OFFICE O/P NEW MOD 45 MIN: CPT | Performed by: OTHER

## 2023-05-02 PROCEDURE — 3008F BODY MASS INDEX DOCD: CPT | Performed by: OTHER

## 2023-05-02 RX ORDER — SUMATRIPTAN 100 MG/1
TABLET, FILM COATED ORAL
Qty: 9 TABLET | Refills: 5 | Status: SHIPPED | OUTPATIENT
Start: 2023-05-02

## 2023-06-07 ENCOUNTER — HOSPITAL ENCOUNTER (OUTPATIENT)
Dept: MRI IMAGING | Age: 33
Discharge: HOME | End: 2023-06-07
Attending: Other
Payer: MEDICAID

## 2023-06-07 DIAGNOSIS — R20.2 PARESTHESIA: ICD-10-CM

## 2023-06-07 PROCEDURE — 70551 MRI BRAIN STEM W/O DYE: CPT | Performed by: OTHER

## 2023-06-09 ENCOUNTER — TELEPHONE (OUTPATIENT)
Dept: NEUROLOGY | Facility: CLINIC | Age: 33
End: 2023-06-09

## 2023-06-09 NOTE — TELEPHONE ENCOUNTER
----- Message from Isidra Sidhu MD sent at 6/9/2023  8:22 AM CDT -----  Please let patient know that MRI brain didn't show significant abnormalities.

## 2023-06-13 ENCOUNTER — OFFICE VISIT (OUTPATIENT)
Dept: FAMILY MEDICINE CLINIC | Facility: CLINIC | Age: 33
End: 2023-06-13

## 2023-06-13 VITALS
TEMPERATURE: 98 F | HEART RATE: 76 BPM | HEIGHT: 64 IN | WEIGHT: 142 LBS | SYSTOLIC BLOOD PRESSURE: 113 MMHG | BODY MASS INDEX: 24.24 KG/M2 | DIASTOLIC BLOOD PRESSURE: 76 MMHG

## 2023-06-13 DIAGNOSIS — J03.90 TONSILLITIS: Primary | ICD-10-CM

## 2023-06-13 DIAGNOSIS — J03.00 STREP TONSILLITIS: ICD-10-CM

## 2023-06-13 PROCEDURE — 3008F BODY MASS INDEX DOCD: CPT | Performed by: NURSE PRACTITIONER

## 2023-06-13 PROCEDURE — 3074F SYST BP LT 130 MM HG: CPT | Performed by: NURSE PRACTITIONER

## 2023-06-13 PROCEDURE — 99214 OFFICE O/P EST MOD 30 MIN: CPT | Performed by: NURSE PRACTITIONER

## 2023-06-13 PROCEDURE — 3078F DIAST BP <80 MM HG: CPT | Performed by: NURSE PRACTITIONER

## 2023-06-13 RX ORDER — IBUPROFEN 800 MG/1
800 TABLET ORAL EVERY 6 HOURS PRN
COMMUNITY

## 2023-06-13 RX ORDER — AMOXICILLIN AND CLAVULANATE POTASSIUM 875; 125 MG/1; MG/1
1 TABLET, FILM COATED ORAL 2 TIMES DAILY
Qty: 14 TABLET | Refills: 0 | Status: SHIPPED | OUTPATIENT
Start: 2023-06-13 | End: 2023-06-20

## 2023-06-13 NOTE — ASSESSMENT & PLAN NOTE
Pt was tested for strep at [facility] and positive for strep  augmentin 875 mg I po twice a day x7 days  Supportive care discussed to help alleviate symptoms  Please call if symptoms worsen or are not resolving.

## 2023-06-19 NOTE — PROGRESS NOTES
Carlo on phone for appt. GBS nega and reviewed normal 3T labs. They have written labor instructions. Sara Pepper

## 2023-08-01 ENCOUNTER — OFFICE VISIT (OUTPATIENT)
Dept: NEUROLOGY | Facility: CLINIC | Age: 33
End: 2023-08-01
Payer: MEDICAID

## 2023-08-01 VITALS — WEIGHT: 150 LBS | HEIGHT: 64 IN | BODY MASS INDEX: 25.61 KG/M2

## 2023-08-01 DIAGNOSIS — G43.009 MIGRAINE WITHOUT AURA AND WITHOUT STATUS MIGRAINOSUS, NOT INTRACTABLE: Primary | ICD-10-CM

## 2023-08-01 DIAGNOSIS — R20.2 PARESTHESIA: ICD-10-CM

## 2023-08-01 PROCEDURE — 99214 OFFICE O/P EST MOD 30 MIN: CPT | Performed by: OTHER

## 2023-08-01 PROCEDURE — 3008F BODY MASS INDEX DOCD: CPT | Performed by: OTHER

## 2023-08-01 RX ORDER — SUMATRIPTAN 100 MG/1
TABLET, FILM COATED ORAL
Qty: 9 TABLET | Refills: 5 | Status: SHIPPED | OUTPATIENT
Start: 2023-08-01

## 2023-08-01 NOTE — PROGRESS NOTES
Neurology follow-up visit     Referred By: Dr. Contreras ref. provider found    Chief Complaint: Patient presents with:  Migraine: LOV 05/02/23 patient present for migraines. She states she was in [place] for 10 weeks, she has had now headaches. Vern Fontaine E#[id number] Thaddeusia Tomer. Patient states that SUMAtriptan Succinate 100 MG Oral Tab doesn't work for her migraines she is taking Losartan 600mg. HPI:     Samantha Pineda is a 35year old female, who presents for headaches. Patient has history of migraines for a number of years. She can remember exactly, but appears that she was in the hospital back in 2015 for headaches and was pregnant at that time, MRI of the brain and MRA of the head was done. This was not revealing. It is becoming more frequent as reported in the first visit in May 2023, about 3 times a month, associate with some numbness across the forehead, history of light sensitive, nausea but no vomiting. It lasted for 4 hours a couple days ago time. MRI of the brain was repeated, no obvious acute changes. Patient was prescribed sumatriptan since she did not want to do any preventive therapy at that time yet. Patient came back for follow-up in August 2023. Reported no need to use sumatriptan since she had no migraines since that few months time. She felt good. Past Medical History:   Diagnosis Date    Gestational diabetes     Human papilloma virus infection     Migraine headache     Varicose veins of both lower extremities        Past Surgical History:   Procedure Laterality Date    COLPOSCOPY, CERVIX W/UPPER ADJACENT VAGINA; W/BIOPSY(S), CERVIX         Social history:  Smoking status:   Never      Smokeless tobacco:   Never       Alcohol use   Not Currently      Comment:   None.            Drug use:   Unknown         Family History   Problem Relation Age of Onset    Diabetes Maternal Uncle     Glaucoma Neg          Current Outpatient Medications:     ibuprofen 800 MG Oral Tab, Take 1 tablet (800 mg total) by mouth every 6 (six) hours as needed for Pain., Disp: , Rfl:     SUMAtriptan Succinate 100 MG Oral Tab, Use at onset; repeat once after 2 HRS-ONLY 2 IN 24 HR MAX. This is a 30 day supply. , Disp: 9 tablet, Rfl: 5    Ascorbic Acid (VITAMIN C OR), Take by mouth., Disp: , Rfl:     Linolenic Acid (OMEGA 3 OR), Take by mouth., Disp: , Rfl:     Multiple Vitamin (MULTI-VITAMIN DAILY) Oral Tab, Take by mouth., Disp: , Rfl:     No Known Allergies    ROS:   As in HPI, the rest of the 14 system review was done and was negative      Physical Exam:   08/01/23  0956   Weight: 150 lb (68 kg)   Height: 64\"       General: No apparent distress, well nourished, well groomed. Head- Normocephalic, atraumatic  Eyes- No redness or swelling  ENT- Hearing intake, normal glutition  Neck- No masses or adenopathy  Cv: pulses were palpable and normal, no cyanosis or edema     Neurological:     Mental Status- Alert, unable to assess fully due to language barrier. Cranial Nerves:  VII. Face symmetric, no facial weakness  VIII. Hearing intact to whisper. IX. Pallet elevates symmetrically. XI. Shoulder shrug is intact  XII.  Tongue is midline    Motor Exam:  Muscle tone normal  No atrophy or fasciculations  Strength- upper extremities 5/5 proximally and distally                   Rapid alternating movements intact    Gait:  Normal posture  Normal physiologic      Labs:    Lab Results   Component Value Date    TSH 0.895 01/04/2023     Lab Results   Component Value Date    HDL 47 01/04/2023    LDL 79 01/04/2023    TRIG 133 01/04/2023     Lab Results   Component Value Date    HGB 12.5 01/04/2023    HCT 38.4 01/04/2023    MCV 89.7 01/04/2023    WBC 7.3 01/04/2023    .0 01/04/2023      Lab Results   Component Value Date    BUN 16 01/04/2023    CA 9.0 01/04/2023    ALT 20 01/04/2023    AST 12 (L) 01/04/2023    ALKPHOS 51 07/13/2016    ALB 3.8 01/04/2023     01/04/2023    K 4.2 01/04/2023     01/04/2023 CO2 27.0 01/04/2023      I have reviewed labs. MRI of the brain was independently reviewed. Assessment   1. Migraine without aura and without status migrainosus, not intractable  Most likely complicated migraines with paresthesias, repeat MRI was essentially normal.  At this point no significant frequency of migraines, continue to monitor clinically. If they become more than few times a month she will come back to discuss preventive treatment. We will continue with sumatriptan occasionally as needed             Education and counseling provided to patient. Instructed patient to call my office or seek medical attention immediately if symptoms worsen. Patient verbalized understanding of information given. All questions were answered. All side effects of drugs were discussed. Return to clinic in: No follow-ups on file.     Kristen Li MD

## 2023-11-21 ENCOUNTER — LAB ENCOUNTER (OUTPATIENT)
Dept: LAB | Age: 33
End: 2023-11-21
Attending: PHYSICIAN ASSISTANT
Payer: MEDICAID

## 2023-11-21 ENCOUNTER — OFFICE VISIT (OUTPATIENT)
Dept: FAMILY MEDICINE CLINIC | Facility: CLINIC | Age: 33
End: 2023-11-21

## 2023-11-21 VITALS
DIASTOLIC BLOOD PRESSURE: 78 MMHG | HEART RATE: 79 BPM | BODY MASS INDEX: 24.59 KG/M2 | SYSTOLIC BLOOD PRESSURE: 114 MMHG | HEIGHT: 64 IN | WEIGHT: 144 LBS

## 2023-11-21 DIAGNOSIS — N92.6 IRREGULAR MENSES: ICD-10-CM

## 2023-11-21 DIAGNOSIS — N92.6 IRREGULAR MENSES: Primary | ICD-10-CM

## 2023-11-21 PROBLEM — M54.2 NECK PAIN: Status: RESOLVED | Noted: 2023-02-16 | Resolved: 2023-11-21

## 2023-11-21 PROBLEM — J03.00 STREP TONSILLITIS: Status: RESOLVED | Noted: 2023-06-13 | Resolved: 2023-11-21

## 2023-11-21 LAB
CONTROL LINE PRESENT WITH A CLEAR BACKGROUND (YES/NO): YES YES/NO
HCG SERPL QL: NEGATIVE
KIT LOT #: NORMAL NUMERIC
PREGNANCY TEST, URINE: NEGATIVE

## 2023-11-21 PROCEDURE — 36415 COLL VENOUS BLD VENIPUNCTURE: CPT

## 2023-11-21 PROCEDURE — 84703 CHORIONIC GONADOTROPIN ASSAY: CPT

## 2023-11-29 ENCOUNTER — LAB ENCOUNTER (OUTPATIENT)
Dept: LAB | Facility: HOSPITAL | Age: 33
End: 2023-11-29
Attending: OBSTETRICS & GYNECOLOGY
Payer: MEDICAID

## 2023-11-29 ENCOUNTER — OFFICE VISIT (OUTPATIENT)
Dept: OBGYN CLINIC | Facility: CLINIC | Age: 33
End: 2023-11-29

## 2023-11-29 VITALS
WEIGHT: 146 LBS | BODY MASS INDEX: 25 KG/M2 | SYSTOLIC BLOOD PRESSURE: 125 MMHG | DIASTOLIC BLOOD PRESSURE: 76 MMHG | HEART RATE: 71 BPM

## 2023-11-29 DIAGNOSIS — Z11.3 SCREEN FOR STD (SEXUALLY TRANSMITTED DISEASE): ICD-10-CM

## 2023-11-29 DIAGNOSIS — Z30.09 BIRTH CONTROL COUNSELING: ICD-10-CM

## 2023-11-29 DIAGNOSIS — Z01.419 ENCOUNTER FOR GYNECOLOGICAL EXAMINATION WITHOUT ABNORMAL FINDING: Primary | ICD-10-CM

## 2023-11-29 PROBLEM — T83.32XA MALPOSITIONED INTRAUTERINE DEVICE: Status: RESOLVED | Noted: 2021-10-05 | Resolved: 2023-11-29

## 2023-11-29 LAB
HBV SURFACE AG SER-ACNC: <0.1 [IU]/L
HBV SURFACE AG SERPL QL IA: NONREACTIVE
HCV AB SERPL QL IA: NONREACTIVE

## 2023-11-29 PROCEDURE — 86803 HEPATITIS C AB TEST: CPT

## 2023-11-29 PROCEDURE — 3074F SYST BP LT 130 MM HG: CPT | Performed by: OBSTETRICS & GYNECOLOGY

## 2023-11-29 PROCEDURE — 99395 PREV VISIT EST AGE 18-39: CPT | Performed by: OBSTETRICS & GYNECOLOGY

## 2023-11-29 PROCEDURE — 86780 TREPONEMA PALLIDUM: CPT

## 2023-11-29 PROCEDURE — 36415 COLL VENOUS BLD VENIPUNCTURE: CPT

## 2023-11-29 PROCEDURE — 87389 HIV-1 AG W/HIV-1&-2 AB AG IA: CPT

## 2023-11-29 PROCEDURE — 87340 HEPATITIS B SURFACE AG IA: CPT

## 2023-11-29 PROCEDURE — 3078F DIAST BP <80 MM HG: CPT | Performed by: OBSTETRICS & GYNECOLOGY

## 2023-11-29 NOTE — PROGRESS NOTES
Raj Alan is a 35year old female [id number] Patient's last menstrual period was 2023 (within days). Chief Complaint   Patient presents with    Gyn Exam     Annual -- last seen in  for displaced IUD removal    Std Screen     Wishes for full screen    Contraception     Wishes for Mirena IUD again   .     OBSTETRICS HISTORY:     OB History    Para Term  AB Living   4 3 3   1 3   SAB IAB Ectopic Multiple Live Births   1       3      # Outcome Date GA Lbr Gerry/2nd Weight Sex Delivery Anes PTL Lv   4 Term 10/28/20 39w0d 05:35 / 00:08 6 lb 13.4 oz (3.1 kg) F NORMAL SPONT Local N SHOLA      Complications: Precipitous labor, Other - see comments   3 Term 19 38w0d 06:44 / 00:14 5 lb 15.1 oz (2.695 kg) M NORMAL SPONT EPI N SHOLA      Birth Comments: Baby's name - Guillermina Marquez      Complications: Variable decelerations   2 Term 12/18/15 40w0d  6 lb 12 oz (3.062 kg) F Vag-Spont  N SHOLA      Complications: Abnormal glucose complicating pregnancy, First degree perineal laceration during delivery, Other specified trauma to perineum and vulva, Full-term premature rupture of membranes, onset of labor more than 24 hours following rupture   1 SAB 14 6w0d   U          Birth Comments: Denies a D&C       GYNE HISTORY:     Periods regular monthly      None    History   Sexual Activity    Sexual activity: Yes    Partners: Male        Menarche: 15   Use of Birth Control (if yes, specify type): NONE  Pap Date: 20  Pap Result Notes: PAP HPV NEG  Follow Up Recommendation: MAMMO 16 NEGATIVE          Latest Ref Rng & Units 2020    10:47 AM 2020    11:22 AM 2019    10:04 AM   RECENT PAP RESULTS   Thinprep Pap Negative for intraepithelial lesion or malignancy Negative for intraepithelial lesion or malignancy  Negative for intraepithelial lesion or malignancy  Low grade squamous intraepithelial lesion (LSIL)    HPV Negative Negative            MEDICAL HISTORY:     Past Medical History:   Diagnosis Date    Gestational diabetes     Human papilloma virus infection     Migraine headache     Varicose veins of both lower extremities      Past Surgical History:   Procedure Laterality Date    COLPOSCOPY, CERVIX W/UPPER ADJACENT VAGINA; W/BIOPSY(S), CERVIX       OB History    Para Term  AB Living   4 3 3 0 1 3   SAB IAB Ectopic Multiple Live Births   1 0 0 0 3        SOCIAL HISTORY:     Tobacco Use: Low Risk  (2023)    Patient History     Smoking Tobacco Use: Never     Smokeless Tobacco Use: Never     Passive Exposure: Not on file       FAMILY HISTORY:     Family History   Problem Relation Age of Onset    Diabetes Maternal Uncle     Glaucoma Neg          MEDICATIONS:       Current Outpatient Medications:     SUMAtriptan Succinate 100 MG Oral Tab, Use at onset; repeat once after 2 HRS-ONLY 2 IN 24 HR MAX. This is a 30 day supply. , Disp: 9 tablet, Rfl: 5    ibuprofen 800 MG Oral Tab, Take 1 tablet (800 mg total) by mouth every 6 (six) hours as needed for Pain., Disp: , Rfl:     Ascorbic Acid (VITAMIN C OR), Take by mouth., Disp: , Rfl:     Linolenic Acid (OMEGA 3 OR), Take by mouth., Disp: , Rfl:     Multiple Vitamin (MULTI-VITAMIN DAILY) Oral Tab, Take by mouth., Disp: , Rfl:     ALLERGIES:     No Known Allergies      REVIEW OF SYSTEMS:     Constitutional:    denies fever / chills  Eyes:     denies blurred or double vision  Cardiovascular:  denies chest pain or palpitations  Respiratory:    denies shortness of breath  Gastrointestinal:  denies severe abdominal pain, frequent diarrhea or constipation, nausea / vomiting  Genitourinary:    denies dysuria, bothersome incontinence  Skin/Breast:   denies any breast pain, lumps, or discharge  Neurological:    denies frequent severe headaches  Psychiatric:   denies depression or anxiety, thoughts of harming self or others  Heme/Lymph:    denies easy bruising or bleeding      PHYSICAL EXAM:   Blood pressure 125/76, pulse 71, weight 146 lb (66.2 kg), last menstrual period 11/13/2023, not currently breastfeeding. Constitutional:  well developed, well nourished  Head/Face:  normocephalic  Neck/Thyroid: thyroid symmetric, no thyromegaly, no nodules, no adenopathy  Lymphatic: no abnormal supraclavicular or axillary adenopathy is noted  Breast:   normal without palpable masses, tenderness, asymmetry, nipple discharge, nipple retraction or skin changes  Abdomen:   soft, nontender, nondistended, no masses  Skin/Hair:  no unusual rashes or bruises  Extremities:  no edema, no cyanosis  Psychiatric:   oriented to time, place, person and situation. Appropriate mood and affect    Pelvic Exam:  External Genitalia:  normal appearance, hair distribution, and no lesions  Urethral Meatus:   normal in size, location, without lesions and prolapse  Bladder:    no fullness, masses or tenderness  Vagina:    normal appearance without lesions, no abnormal discharge  Cervix:    normal without tenderness on motion  Uterus:    normal in size, contour, position, mobility, without tenderness  Adnexa:   normal without masses or tenderness  Perineum:   normal  Anus: no hemorroids         ASSESSMENT & PLAN:     Rosamaria Hassan was seen today for gyn exam, std screen and contraception. Diagnoses and all orders for this visit:    Encounter for gynecological examination without abnormal finding    Screen for STD (sexually transmitted disease)  -     HCV Antibody; Future  -     Hepatitis B Surface Antigen; Future  -     HIV Ag/Ab Combo; Future  -     T Pallidum Screening [place]; Future    Birth control counseling      Email on first day of period for Mirena IUD insertion D#1-5 of next cycle. SUMMARY:  Pap: Next cotest today per ASCCP guidelines.   BCM:  None  STD screening: GC/Chl/Trich/HepB/HepC/HIV/RPR, condoms encouraged  Mammogram: n/a -- once 36 yrs old   updated  Depression screen:   Depression Screening (PHQ-2/PHQ-9): Over the LAST 2 WEEKS   Little interest or pleasure in doing things (over the last two weeks)?: Not at all    Feeling down, depressed, or hopeless (over the last two weeks)?: Not at all    PHQ-2 SCORE: 0          FOLLOW-UP     Return in about 1 year (around 11/29/2024) for annual gyne exam.    Note to patient and family:  The [facility] makes medical notes available to patients in the interest of transparency. However, please be advised that this is a medical document. It is intended as a peer to peer communication. It is written in medical language and may contain abbreviations or verbiage that are technical and unfamiliar. It may appear blunt or direct. Medical documents are intended to carry relevant information, facts as evident, and the clinical opinion of the practitioner.

## 2023-11-30 LAB
C TRACH DNA SPEC QL NAA+PROBE: NEGATIVE
HPV I/H RISK 1 DNA SPEC QL NAA+PROBE: NEGATIVE
N GONORRHOEA DNA SPEC QL NAA+PROBE: NEGATIVE

## 2023-12-01 LAB
T PALLIDUM AB SER QL: NEGATIVE
T VAGINALIS RRNA SPEC QL NAA+PROBE: NEGATIVE

## 2023-12-04 ENCOUNTER — TELEPHONE (OUTPATIENT)
Dept: OBGYN CLINIC | Facility: CLINIC | Age: 33
End: 2023-12-04

## 2023-12-04 NOTE — TELEPHONE ENCOUNTER
Called pt using language line, [id number]. Pt reports she started her period yesterday and its a full flow. Pt accepted appt with DORETHA tomorrow for IUD insertion. Pt advised to take 600mg ibuprofen 30-60 min prior to appt.

## 2023-12-04 NOTE — TELEPHONE ENCOUNTER
Pt was seen last week and told to call when she started cycle to schedule IUD insert.  Please advise

## 2023-12-05 ENCOUNTER — OFFICE VISIT (OUTPATIENT)
Dept: OBGYN CLINIC | Facility: CLINIC | Age: 33
End: 2023-12-05

## 2023-12-05 VITALS
DIASTOLIC BLOOD PRESSURE: 76 MMHG | WEIGHT: 144 LBS | HEART RATE: 84 BPM | BODY MASS INDEX: 25 KG/M2 | SYSTOLIC BLOOD PRESSURE: 125 MMHG

## 2023-12-05 DIAGNOSIS — Z30.430 ENCOUNTER FOR INSERTION OF INTRAUTERINE CONTRACEPTIVE DEVICE (IUD): Primary | ICD-10-CM

## 2023-12-05 PROCEDURE — 3074F SYST BP LT 130 MM HG: CPT | Performed by: OBSTETRICS & GYNECOLOGY

## 2023-12-05 PROCEDURE — 3078F DIAST BP <80 MM HG: CPT | Performed by: OBSTETRICS & GYNECOLOGY

## 2023-12-05 PROCEDURE — 58300 INSERT INTRAUTERINE DEVICE: CPT | Performed by: OBSTETRICS & GYNECOLOGY

## 2023-12-05 NOTE — PROCEDURES
IUD Insertion       Birth control method(s) used:    LMP: 12/3/23    Consent signed. Procedure discussed with the patient in detail including indication, risks, benefits, alternatives and complications. Pelvic Exam Findings:  Uterus: normal    Procedure:  Speculum placed in the vagina. Betadine wash of vagina and cervix. Single tooth tenaculum was placed at the 12 o'clock position. Uterus sounded to 7 cm. Mirena IUD was placed without difficulty. Strings cut at 3 cm. Single tooth tenaculum removed. Silver nitrate used to achieve hemostasis. Good hemostasis noted. Patient tolerated procedure well. Visit Plan:  IUD surveillance was discussed with the patient.

## 2023-12-12 ENCOUNTER — HOSPITAL ENCOUNTER (OUTPATIENT)
Age: 33
Discharge: HOME | End: 2023-12-12
Payer: MEDICAID

## 2023-12-12 VITALS
OXYGEN SATURATION: 99 % | TEMPERATURE: 98 F | HEART RATE: 87 BPM | RESPIRATION RATE: 16 BRPM | DIASTOLIC BLOOD PRESSURE: 69 MMHG | SYSTOLIC BLOOD PRESSURE: 132 MMHG

## 2023-12-12 DIAGNOSIS — B97.89 VIRAL RESPIRATORY ILLNESS: Primary | ICD-10-CM

## 2023-12-12 DIAGNOSIS — J98.8 VIRAL RESPIRATORY ILLNESS: Primary | ICD-10-CM

## 2023-12-12 PROCEDURE — 99203 OFFICE O/P NEW LOW 30 MIN: CPT | Performed by: NURSE PRACTITIONER

## 2024-01-04 ENCOUNTER — OFFICE VISIT (OUTPATIENT)
Dept: FAMILY MEDICINE CLINIC | Facility: CLINIC | Age: 34
End: 2024-01-04

## 2024-01-04 VITALS
WEIGHT: 150 LBS | SYSTOLIC BLOOD PRESSURE: 108 MMHG | HEART RATE: 73 BPM | DIASTOLIC BLOOD PRESSURE: 70 MMHG | BODY MASS INDEX: 26 KG/M2

## 2024-01-04 DIAGNOSIS — Z00.00 ENCOUNTER FOR ANNUAL HEALTH EXAMINATION: Primary | ICD-10-CM

## 2024-01-04 DIAGNOSIS — E55.9 VITAMIN D DEFICIENCY: ICD-10-CM

## 2024-01-04 DIAGNOSIS — H10.13 ALLERGIC CONJUNCTIVITIS OF BOTH EYES: ICD-10-CM

## 2024-01-04 PROCEDURE — 3078F DIAST BP <80 MM HG: CPT | Performed by: FAMILY MEDICINE

## 2024-01-04 PROCEDURE — 3074F SYST BP LT 130 MM HG: CPT | Performed by: FAMILY MEDICINE

## 2024-01-04 PROCEDURE — 99395 PREV VISIT EST AGE 18-39: CPT | Performed by: FAMILY MEDICINE

## 2024-01-04 RX ORDER — KETOTIFEN FUMARATE 0.35 MG/ML
2 SOLUTION/ DROPS OPHTHALMIC 2 TIMES DAILY
Qty: 5 ML | Refills: 3 | Status: SHIPPED | OUTPATIENT
Start: 2024-01-04

## 2024-01-04 RX ORDER — AMOXICILLIN 875 MG/1
875 TABLET, COATED ORAL 2 TIMES DAILY
COMMUNITY
Start: 2023-12-28

## 2024-01-05 ENCOUNTER — LAB ENCOUNTER (OUTPATIENT)
Dept: LAB | Age: 34
End: 2024-01-05
Attending: FAMILY MEDICINE
Payer: MEDICAID

## 2024-01-05 DIAGNOSIS — Z00.00 ENCOUNTER FOR ANNUAL HEALTH EXAMINATION: ICD-10-CM

## 2024-01-05 DIAGNOSIS — E55.9 VITAMIN D DEFICIENCY: ICD-10-CM

## 2024-01-05 LAB
ALBUMIN SERPL-MCNC: 4.2 G/DL (ref 3.2–4.8)
ALBUMIN/GLOB SERPL: 1.5 {RATIO} (ref 1–2)
ALP LIVER SERPL-CCNC: 45 U/L
ALT SERPL-CCNC: 11 U/L
ANION GAP SERPL CALC-SCNC: 6 MMOL/L (ref 0–18)
AST SERPL-CCNC: 12 U/L (ref ?–34)
BASOPHILS # BLD AUTO: 0.03 X10(3) UL (ref 0–0.2)
BASOPHILS NFR BLD AUTO: 0.4 %
BILIRUB SERPL-MCNC: 0.2 MG/DL (ref 0.3–1.2)
BUN BLD-MCNC: 12 MG/DL (ref 9–23)
BUN/CREAT SERPL: 16.2 (ref 10–20)
CALCIUM BLD-MCNC: 8.9 MG/DL (ref 8.7–10.4)
CHLORIDE SERPL-SCNC: 107 MMOL/L (ref 98–112)
CHOLEST SERPL-MCNC: 132 MG/DL (ref ?–200)
CO2 SERPL-SCNC: 26 MMOL/L (ref 21–32)
CREAT BLD-MCNC: 0.74 MG/DL
DEPRECATED RDW RBC AUTO: 42.9 FL (ref 35.1–46.3)
EGFRCR SERPLBLD CKD-EPI 2021: 109 ML/MIN/1.73M2 (ref 60–?)
EOSINOPHIL # BLD AUTO: 0.13 X10(3) UL (ref 0–0.7)
EOSINOPHIL NFR BLD AUTO: 1.8 %
ERYTHROCYTE [DISTWIDTH] IN BLOOD BY AUTOMATED COUNT: 13.7 % (ref 11–15)
EST. AVERAGE GLUCOSE BLD GHB EST-MCNC: 105 MG/DL (ref 68–126)
FASTING PATIENT LIPID ANSWER: YES
FASTING STATUS PATIENT QL REPORTED: YES
GLOBULIN PLAS-MCNC: 2.8 G/DL (ref 2.8–4.4)
GLUCOSE BLD-MCNC: 90 MG/DL (ref 70–99)
HBA1C MFR BLD: 5.3 % (ref ?–5.7)
HCT VFR BLD AUTO: 36.9 %
HDLC SERPL-MCNC: 47 MG/DL (ref 40–59)
HGB BLD-MCNC: 11.6 G/DL
IMM GRANULOCYTES # BLD AUTO: 0.03 X10(3) UL (ref 0–1)
IMM GRANULOCYTES NFR BLD: 0.4 %
LDLC SERPL CALC-MCNC: 63 MG/DL (ref ?–100)
LYMPHOCYTES # BLD AUTO: 1.98 X10(3) UL (ref 1–4)
LYMPHOCYTES NFR BLD AUTO: 27.1 %
MCH RBC QN AUTO: 27 PG (ref 26–34)
MCHC RBC AUTO-ENTMCNC: 31.4 G/DL (ref 31–37)
MCV RBC AUTO: 86 FL
MONOCYTES # BLD AUTO: 0.33 X10(3) UL (ref 0.1–1)
MONOCYTES NFR BLD AUTO: 4.5 %
NEUTROPHILS # BLD AUTO: 4.8 X10 (3) UL (ref 1.5–7.7)
NEUTROPHILS # BLD AUTO: 4.8 X10(3) UL (ref 1.5–7.7)
NEUTROPHILS NFR BLD AUTO: 65.8 %
NONHDLC SERPL-MCNC: 85 MG/DL (ref ?–130)
OSMOLALITY SERPL CALC.SUM OF ELEC: 287 MOSM/KG (ref 275–295)
PLATELET # BLD AUTO: 327 10(3)UL (ref 150–450)
POTASSIUM SERPL-SCNC: 4.1 MMOL/L (ref 3.5–5.1)
PROT SERPL-MCNC: 7 G/DL (ref 5.7–8.2)
RBC # BLD AUTO: 4.29 X10(6)UL
SODIUM SERPL-SCNC: 139 MMOL/L (ref 136–145)
TRIGL SERPL-MCNC: 124 MG/DL (ref 30–149)
TSI SER-ACNC: 0.74 MIU/ML (ref 0.55–4.78)
VIT D+METAB SERPL-MCNC: 26.2 NG/ML (ref 30–100)
VLDLC SERPL CALC-MCNC: 18 MG/DL (ref 0–30)
WBC # BLD AUTO: 7.3 X10(3) UL (ref 4–11)

## 2024-01-05 PROCEDURE — 80053 COMPREHEN METABOLIC PANEL: CPT

## 2024-01-05 PROCEDURE — 84443 ASSAY THYROID STIM HORMONE: CPT

## 2024-01-05 PROCEDURE — 82306 VITAMIN D 25 HYDROXY: CPT

## 2024-01-05 PROCEDURE — 80061 LIPID PANEL: CPT

## 2024-01-05 PROCEDURE — 83036 HEMOGLOBIN GLYCOSYLATED A1C: CPT

## 2024-01-05 PROCEDURE — 85025 COMPLETE CBC W/AUTO DIFF WBC: CPT

## 2024-01-05 PROCEDURE — 36415 COLL VENOUS BLD VENIPUNCTURE: CPT

## 2024-01-09 NOTE — PROGRESS NOTES
HPI:   Tameka Monet is a 33 year old female who presents for a complete physical exam.        Wt Readings from Last 3 Encounters:   01/04/24 150 lb (68 kg)   12/05/23 144 lb (65.3 kg)   11/29/23 146 lb (66.2 kg)     Body mass index is 25.75 kg/m².       Current Outpatient Medications   Medication Sig Dispense Refill    amoxicillin 875 MG Oral Tab Take 1 tablet (875 mg total) by mouth 2 (two) times daily.      IUD'S IU by Intrauterine route.      ketotifen 0.035 % Ophthalmic Solution Place 2 drops into both eyes 2 (two) times daily. 5 mL 3    SUMAtriptan Succinate 100 MG Oral Tab Use at onset; repeat once after 2 HRS-ONLY 2 IN 24 HR MAX.  This is a 30 day supply. (Patient not taking: Reported on 1/4/2024) 9 tablet 5    ibuprofen 800 MG Oral Tab Take 1 tablet (800 mg total) by mouth every 6 (six) hours as needed for Pain. (Patient not taking: Reported on 1/4/2024)      Ascorbic Acid (VITAMIN C OR) Take by mouth. (Patient not taking: Reported on 1/4/2024)      Linolenic Acid (OMEGA 3 OR) Take by mouth. (Patient not taking: Reported on 1/4/2024)      Multiple Vitamin (MULTI-VITAMIN DAILY) Oral Tab Take by mouth. (Patient not taking: Reported on 1/4/2024)        Past Medical History:   Diagnosis Date    Gestational diabetes     Human papilloma virus infection     Migraine headache     Varicose veins of both lower extremities       Past Surgical History:   Procedure Laterality Date    COLPOSCOPY, CERVIX W/UPPER ADJACENT VAGINA; W/BIOPSY(S), CERVIX        Family History   Problem Relation Age of Onset    Diabetes Maternal Uncle     Glaucoma Neg       Social History:   Social History     Socioeconomic History    Marital status:    Tobacco Use    Smoking status: Never     Passive exposure: Never    Smokeless tobacco: Never   Vaping Use    Vaping Use: Never used   Substance and Sexual Activity    Alcohol use: Not Currently     Comment: None.     Drug use: Never    Sexual activity: Yes     Partners: Male           REVIEW OF SYSTEMS:   Negative, except per HPI.     EXAM:   /70 (BP Location: Right arm, Patient Position: Sitting, Cuff Size: adult)   Pulse 73   Wt 150 lb (68 kg)   LMP 12/03/2023 (Within Days)   BMI 25.75 kg/m²     GENERAL: well developed, well nourished, in no apparent distress  SKIN: no rashes, no suspicious lesions  HEENT: atraumatic, normocephalic, ears are clear  EYES: PERRLA, EOMI,conjunctiva are clear  NECK: supple, no adenopathy  LUNGS: clear to auscultation  CARDIO: RRR without murmur  GI: good BS, no masses or tenderness  MUSCULOSKELETAL: back is not tender, FROM of the back  EXTREMITIES: no cyanosis or edema  NEURO: Oriented times three, cranial nerves are intact, motor and sensory are grossly intact    ASSESSMENT AND PLAN:   Tameka Monet is a 33 year old female who presents for a complete physical exam.    1. Encounter for annual health examination    - CBC With Differential With Platelet; Future  - Comp Metabolic Panel (14); Future  - Hemoglobin A1C; Future  - Lipid Panel; Future  - TSH W Reflex To Free T4; Future  - Vitamin D; Future    2. Vitamin D deficiency    - Vitamin D; Future    3. Allergic conjunctivitis of both eyes    - ketotifen 0.035 % Ophthalmic Solution; Place 2 drops into both eyes 2 (two) times daily.  Dispense: 5 mL; Refill: 3       Brionna Oneill MD  1/9/2024  9:18 AM

## 2024-01-31 ENCOUNTER — OFFICE VISIT (OUTPATIENT)
Dept: OBGYN CLINIC | Facility: CLINIC | Age: 34
End: 2024-01-31

## 2024-01-31 VITALS
BODY MASS INDEX: 26 KG/M2 | HEART RATE: 86 BPM | DIASTOLIC BLOOD PRESSURE: 77 MMHG | SYSTOLIC BLOOD PRESSURE: 114 MMHG | WEIGHT: 150 LBS

## 2024-01-31 DIAGNOSIS — Z30.431 IUD CHECK UP: Primary | ICD-10-CM

## 2024-01-31 PROCEDURE — 99212 OFFICE O/P EST SF 10 MIN: CPT | Performed by: OBSTETRICS & GYNECOLOGY

## 2024-02-01 NOTE — PROGRESS NOTES
Tameka Monet is a 33 year old female  Patient's last menstrual period was 2024 (approximate).   Chief Complaint   Patient presents with    Follow - Up     Could not feel own IUD strings   .     OBSTETRICS HISTORY:  OB History    Para Term  AB Living   4 3 3 0 1 3   SAB IAB Ectopic Multiple Live Births   1 0 0 0 3       GYNE HISTORY:  Periods regular monthly    History   Sexual Activity    Sexual activity: Yes    Partners: Male       MEDICAL HISTORY:  Past Medical History:   Diagnosis Date    Gestational diabetes     Human papilloma virus infection     Migraine headache     Varicose veins of both lower extremities      Past Surgical History:   Procedure Laterality Date    COLPOSCOPY, CERVIX W/UPPER ADJACENT VAGINA; W/BIOPSY(S), CERVIX       OB History    Para Term  AB Living   4 3 3 0 1 3   SAB IAB Ectopic Multiple Live Births   1 0 0 0 3        SOCIAL HISTORY:  Social History     Socioeconomic History    Marital status:      Spouse name: Not on file    Number of children: Not on file    Years of education: Not on file    Highest education level: Not on file   Occupational History    Not on file   Tobacco Use    Smoking status: Never     Passive exposure: Never    Smokeless tobacco: Never   Vaping Use    Vaping Use: Never used   Substance and Sexual Activity    Alcohol use: Not Currently     Comment: None.     Drug use: Never    Sexual activity: Yes     Partners: Male   Other Topics Concern    Not on file   Social History Narrative    Not on file     Social Determinants of Health     Financial Resource Strain: Not on file   Food Insecurity: Not on file   Transportation Needs: Not on file   Physical Activity: Not on file   Stress: Not on file   Social Connections: Not on file   Housing Stability: Not on file       MEDICATIONS:    Current Outpatient Medications:     IUD'S IU, by Intrauterine route., Disp: , Rfl:     Ascorbic Acid (VITAMIN C OR), Take by mouth.,  Disp: , Rfl:     Linolenic Acid (OMEGA 3 OR), Take by mouth., Disp: , Rfl:     Multiple Vitamin (MULTI-VITAMIN DAILY) Oral Tab, Take by mouth., Disp: , Rfl:     amoxicillin 875 MG Oral Tab, Take 1 tablet (875 mg total) by mouth 2 (two) times daily. (Patient not taking: Reported on 1/31/2024), Disp: , Rfl:     ketotifen 0.035 % Ophthalmic Solution, Place 2 drops into both eyes 2 (two) times daily. (Patient not taking: Reported on 1/31/2024), Disp: 5 mL, Rfl: 3    SUMAtriptan Succinate 100 MG Oral Tab, Use at onset; repeat once after 2 HRS-ONLY 2 IN 24 HR MAX.  This is a 30 day supply. (Patient not taking: Reported on 1/4/2024), Disp: 9 tablet, Rfl: 5    ibuprofen 800 MG Oral Tab, Take 1 tablet (800 mg total) by mouth every 6 (six) hours as needed for Pain. (Patient not taking: Reported on 1/4/2024), Disp: , Rfl:     ALLERGIES:  No Known Allergies      Review of Systems:  Constitutional:    denies fatigue, night sweats, hot flashes  Cardiovascular:   denies chest pain or palpitations  Respiratory:    denies shortness of breath  Gastrointestinal:   denies heartburn, abdominal pain, diarrhea or constipation  Genitourinary:    denies dysuria, incontinence, abnormal vaginal discharge, vaginal itching  Musculoskeletal:   denies back pain.  Skin/Breast:    denies any breast pain, lumps, or discharge.   Neurological:    denies headaches, extremity weakness or numbness.  Psychiatric:   denies depression or anxiety.  Endocrine:     denies excessive thirst or urination.  Heme/Lymph:    denies history of anemia, easy bruising or bleeding.      PHYSICAL EXAM:   /77   Pulse 86   Wt 150 lb (68 kg)   LMP 01/22/2024 (Approximate)   BMI 25.75 kg/m²   Constitutional:   well developed, well nourished  Head/Face:  normocephalic  Abdomen:    soft, nontender, nondistended, no masses  Skin/Hair:   no unusual rashes or bruises  Extremities:   no edema, no cyanosis  Psychiatric:    oriented to time, place, person and situation.  Appropriate mood and affect    Pelvic Exam:  External Genitalia:  normal appearance, hair distribution, and no lesions  Urethral Meatus:   normal in size, location, without lesions and prolapse  Bladder:    no fullness, masses or tenderness  Vagina:    normal appearance without lesions, no abnormal discharge  Cervix:    normal without tenderness on motion (+) IUD strings  Uterus:   normal in size, contour, position, mobility, without tenderness  Adnexa:   normal without masses or tenderness  Perineum:   normal  Anus:    no hemorroids   Lymph node:   no inguinal lymph nodes    Assessment & Plan:    Tameka was seen today for follow - up.    Diagnoses and all orders for this visit:    IUD check up        Requested Prescriptions      No prescriptions requested or ordered in this encounter       Reassured pt      Spent total time 10 minutes on obtaining history / chart review, evaluating patient / performing medically appropriate exam, discussing treatment options, counseling / educating, and completing documentation, coordinating care.

## 2024-10-28 ENCOUNTER — HOSPITAL ENCOUNTER (OUTPATIENT)
Age: 34
Discharge: HOME | End: 2024-10-28
Payer: COMMERCIAL

## 2024-10-28 VITALS
SYSTOLIC BLOOD PRESSURE: 129 MMHG | TEMPERATURE: 98 F | HEART RATE: 80 BPM | OXYGEN SATURATION: 98 % | DIASTOLIC BLOOD PRESSURE: 68 MMHG | RESPIRATION RATE: 18 BRPM

## 2024-10-28 DIAGNOSIS — N30.01 ACUTE CYSTITIS WITH HEMATURIA: Primary | ICD-10-CM

## 2024-10-28 LAB
B-HCG UR QL: NEGATIVE
CLARITY UR: CLEAR
GLUCOSE UR STRIP-MCNC: 100 MG/DL
NITRITE UR QL STRIP: POSITIVE
PH UR STRIP: 6 [PH]
PROT UR STRIP-MCNC: 100 MG/DL
SP GR UR STRIP: 1.01
UROBILINOGEN UR STRIP-ACNC: 4 MG/DL

## 2024-10-28 PROCEDURE — 81025 URINE PREGNANCY TEST: CPT | Performed by: NURSE PRACTITIONER

## 2024-10-28 PROCEDURE — 81002 URINALYSIS NONAUTO W/O SCOPE: CPT | Performed by: NURSE PRACTITIONER

## 2024-10-28 PROCEDURE — 99213 OFFICE O/P EST LOW 20 MIN: CPT | Performed by: NURSE PRACTITIONER

## 2024-10-28 RX ORDER — CIPROFLOXACIN 500 MG/1
500 TABLET, FILM COATED ORAL 2 TIMES DAILY
Qty: 14 TABLET | Refills: 0 | Status: SHIPPED | OUTPATIENT
Start: 2024-10-28 | End: 2024-11-04

## 2024-10-28 NOTE — ED PROVIDER NOTES
Patient Seen in: [facility]      History     Chief Complaint   Patient presents with    Urinary Symptoms     Stated Complaint: uti sx    Subjective: This is a 34-year-old female, Surinamese-speaking only,  used to obtain history.  Patient states since Saturday she has had urinary frequency and burning.  She purchased Azo over-the-counter and it is not working.  Mild bilateral lower back pain.  No nausea, vomiting, diarrhea.  No fever, chills, fatigue.  AOx4.  The history is provided by the patient. The history is limited by a language barrier. A  was used ([id number]).             Objective:     No pertinent past medical history.            No pertinent past surgical history.              No pertinent social history.            Review of Systems   Constitutional: Negative.    HENT: Negative.     Respiratory: Negative.     Cardiovascular: Negative.    Gastrointestinal: Negative.    Genitourinary:  Positive for dysuria and frequency. Negative for decreased urine volume, difficulty urinating, dyspareunia, enuresis, flank pain, genital sores, hematuria, pelvic pain and urgency.   Musculoskeletal: Negative.    Skin: Negative.    Neurological: Negative.        Positive for stated complaint: uti sx  Other systems are as noted in HPI.  Constitutional and vital signs reviewed.      All other systems reviewed and negative except as noted above.    Physical Exam     ED Triage Vitals [10/28/24 1652]   /68   Pulse 80   Resp 18   Temp 97.5 °F (36.4 °C)   Temp src Temporal   SpO2 98 %   O2 Device None (Room air)       Current Vitals:   Vital Signs  BP: 129/68  Pulse: 80  Resp: 18  Temp: 97.5 °F (36.4 °C)  Temp src: Temporal    Oxygen Therapy  SpO2: 98 %  O2 Device: None (Room air)        Physical Exam  Constitutional:       General: She is not in acute distress.     Appearance: Normal appearance. She is not ill-appearing.   HENT:      Head: Normocephalic.      Right Ear: External  ear normal.   Cardiovascular:      Rate and Rhythm: Normal rate and regular rhythm.      Pulses: Normal pulses.      Heart sounds: Normal heart sounds.   Pulmonary:      Effort: Pulmonary effort is normal.      Breath sounds: Normal breath sounds.   Abdominal:      General: Abdomen is flat. Bowel sounds are normal.      Palpations: Abdomen is soft.      Tenderness: There is no right CVA tenderness, left CVA tenderness, guarding or rebound.   Musculoskeletal:         General: Normal range of motion.      Cervical back: Normal range of motion.   Skin:     General: Skin is warm.      Capillary Refill: Capillary refill takes less than 2 seconds.   Neurological:      General: No focal deficit present.      Mental Status: She is alert and oriented to person, place, and time.             ED Course     Labs Reviewed   [facility] POCT URINALYSIS DIPSTICK - Abnormal; Notable for the following components:       Result Value    Urine Color Orange (*)     Protein urine 100 (*)     Glucose, Urine 100 (*)     Ketone, Urine Trace (*)     Bilirubin, Urine Small (*)     Blood, Urine Trace-Intact (*)     Nitrite Urine Positive (*)     Urobilinogen urine 4.0 (*)     Leukocyte esterase urine Large (*)     All other components within normal limits   POCT PREGNANCY URINE - Normal   URINE CULTURE, ROUTINE                   MDM      Differentials considered include: Acute cystitis with hematuria, acute cystitis without hematuria, vaginitis, overactive bladder.    Patient has mild suprapubic discomfort on examination.  No rebound or guarding.  No CVA tenderness to percussion.  Denies any hematuria, urgency, retention.  Afebrile.  No nausea or vomiting.  Very low suspicion for nephrolithiasis or pyelonephritis.      Urine obtained in immediate care clinic: Gross urinary tract infection.    Educated patient using  [id number] on antibiotics being sent to the pharmacy.  Twice a day for 7 days.  Start today.  Take with food and water.   Complete full course of antibiotics, do not stop even if patient feels better.    She is aware to increase her water intake to 80 ounces of water a day.    Educated patient to stop taking Azo over-the-counter.    Educated patient on signs symptoms that warrant immediate ER evaluation.  She verbalized understanding agrees with plan of care.      Medical Decision Making      Disposition and Plan     Clinical Impression:  1. Acute cystitis with hematuria         Disposition:  Discharge  10/28/2024  5:26 pm    Follow-up:  No follow-up provider specified.        Medications Prescribed:  Current Discharge Medication List        START taking these medications    Details   ciprofloxacin 500 MG Oral Tab Take 1 tablet (500 mg total) by mouth 2 (two) times daily for 7 days.  Qty: 14 tablet, Refills: 0                 Supplementary Documentation:

## 2024-10-28 NOTE — DISCHARGE INSTRUCTIONS
[place] se mencionó, usted tiene amarilis ITU/infección de vejiga.  Estos términos se usan indistintamente.  Antibiótico enviado a la farmacia.  Empiece hoy.  Tomará antibióticos dos veces al día katt 7 días.  Complete el tratamiento completo con antibióticos, no los suspenda incluso si se siente mejor.  Le hemos enviado escobar orina para cultivo, nos comunicaremos con usted en 2 a 3 días si necesitamos cambiarle los antibióticos.    Mientras tanto, aumente escobar ingesta de líquidos a 80 onzas de agua al día.  Por favor, deje de marilu Azo sin receta.    Si tiene algún síntoma urinario que empeora, incapacidad para orinar, colleen en la orina, fiebre, escalofríos, náuseas o vómitos, vaya a urgencias.

## 2024-10-28 NOTE — ED INITIAL ASSESSMENT (HPI)
Pt with burning with urination since Saturday, lower pack pain. Pt taking pills she bought with no relief. No fever.

## 2024-11-07 ENCOUNTER — OFFICE VISIT (OUTPATIENT)
Dept: FAMILY MEDICINE CLINIC | Facility: CLINIC | Age: 34
End: 2024-11-07

## 2024-11-07 VITALS
DIASTOLIC BLOOD PRESSURE: 83 MMHG | HEIGHT: 62 IN | OXYGEN SATURATION: 98 % | SYSTOLIC BLOOD PRESSURE: 122 MMHG | WEIGHT: 157.38 LBS | HEART RATE: 82 BPM | BODY MASS INDEX: 28.96 KG/M2

## 2024-11-07 DIAGNOSIS — R31.9 HEMATURIA, UNSPECIFIED TYPE: ICD-10-CM

## 2024-11-07 DIAGNOSIS — R30.0 DYSURIA: Primary | ICD-10-CM

## 2024-11-07 DIAGNOSIS — N89.8 VAGINAL ITCHING: ICD-10-CM

## 2024-11-07 DIAGNOSIS — M54.50 LOW BACK PAIN, UNSPECIFIED BACK PAIN LATERALITY, UNSPECIFIED CHRONICITY, UNSPECIFIED WHETHER SCIATICA PRESENT: ICD-10-CM

## 2024-11-07 PROCEDURE — 3079F DIAST BP 80-89 MM HG: CPT

## 2024-11-07 PROCEDURE — 3074F SYST BP LT 130 MM HG: CPT

## 2024-11-07 PROCEDURE — 3008F BODY MASS INDEX DOCD: CPT

## 2024-11-07 PROCEDURE — 99213 OFFICE O/P EST LOW 20 MIN: CPT

## 2024-11-07 RX ORDER — FLUCONAZOLE 200 MG/1
200 TABLET ORAL
Qty: 2 TABLET | Refills: 0 | Status: SHIPPED | OUTPATIENT
Start: 2024-11-07

## 2024-11-07 NOTE — PROGRESS NOTES
Subjective:   Tameka Monet is a 34 year old female who presents for Urgent Care F/u ( 10/28/24 UTI: still has burning sensation, lower back pain )   Patient is a pleasant 34-year-old female past medical history consistent for prediabetes, migraines, allergic rhinitis, and SARAH.  Patient presents office today new to this provider for evaluation of questionable urinary tract infection.  Of note patient recently seen in urgent care on 10/28/2024 diagnosed with acute cystitis.  Urine culture from positive urinalysis showed greater than 100,000 E. coli.  Patient was started on ciprofloxacin 5 mg twice daily x 7 days.  Patient follows up in office today and states via  number [id number] for the last 2 weeks she has pain when she urinates. She has burning when urinating. She does have some itching as well. No discharge. No foul smell, She does have some back pain. She has had the back pain since Saturday. It came and went on Saturday but then more so on Sunday. It can come and go. No fever, no chills. No hx of kidney stones. No issues with constipation. No blaoting sensation.         Past Medical History:    Gestational diabetes (HCC)    Human papilloma virus infection    Migraine headache    Varicose veins of both lower extremities      Past Surgical History:   Procedure Laterality Date    Colposcopy, cervix w/upper adjacent vagina; w/biopsy(s), cervix          History/Other:    Chief Complaint Reviewed and Verified  Nursing Notes Reviewed and   Verified  Tobacco Reviewed  Allergies Reviewed  Medications Reviewed    Problem List Reviewed  Medical History Reviewed  Surgical History   Reviewed  OB Status Reviewed  Family History Reviewed  Social History   Reviewed         Tobacco:  She has never smoked tobacco.    Current Outpatient Medications   Medication Sig Dispense Refill    fluconazole 200 MG Oral Tab Take 1 tablet (200 mg total) by mouth every third day. 2 tablet 0    IUD'S IU by  Intrauterine route.      amoxicillin 875 MG Oral Tab Take 1 tablet (875 mg total) by mouth 2 (two) times daily. (Patient not taking: Reported on 11/7/2024)      ketotifen 0.035 % Ophthalmic Solution Place 2 drops into both eyes 2 (two) times daily. (Patient not taking: Reported on 11/7/2024) 5 mL 3    SUMAtriptan Succinate 100 MG Oral Tab Use at onset; repeat once after 2 HRS-ONLY 2 IN 24 HR MAX.  This is a 30 day supply. (Patient not taking: Reported on 11/7/2024) 9 tablet 5    ibuprofen 800 MG Oral Tab Take 1 tablet (800 mg total) by mouth every 6 (six) hours as needed for Pain. (Patient not taking: Reported on 11/7/2024)      Ascorbic Acid (VITAMIN C OR) Take by mouth. (Patient not taking: Reported on 11/7/2024)      Linolenic Acid (OMEGA 3 OR) Take by mouth. (Patient not taking: Reported on 11/7/2024)      Multiple Vitamin (MULTI-VITAMIN DAILY) Oral Tab Take by mouth. (Patient not taking: Reported on 11/7/2024)           Review of Systems:  Review of Systems   Constitutional: Negative.  Negative for activity change, appetite change, chills and fatigue.   HENT: Negative.  Negative for congestion, postnasal drip, rhinorrhea, sore throat, tinnitus and voice change.    Eyes: Negative.    Respiratory: Negative.  Negative for apnea, cough, chest tightness and shortness of breath.    Cardiovascular:  Negative for chest pain and leg swelling.   Gastrointestinal: Negative.  Negative for abdominal pain, anal bleeding, blood in stool, constipation, diarrhea, nausea and vomiting.   Genitourinary:  Positive for dysuria. Negative for difficulty urinating, flank pain and menstrual problem.   Musculoskeletal:  Positive for back pain. Negative for joint swelling.   Skin: Negative.    Neurological: Negative.  Negative for dizziness and headaches.   Psychiatric/Behavioral: Negative.  Negative for agitation.          Objective:   /83 (BP Location: Right arm, Patient Position: Sitting, Cuff Size: large)   Pulse 82   Ht 5' 2\"  (1.575 m)   Wt 157 lb 6.4 oz (71.4 kg)   LMP 11/07/2024 (Approximate)   SpO2 98%   BMI 28.79 kg/m²  Estimated body mass index is 28.79 kg/m² as calculated from the following:    Height as of this encounter: 5' 2\" (1.575 m).    Weight as of this encounter: 157 lb 6.4 oz (71.4 kg).  Physical Exam  Nursing note reviewed.   Constitutional:       Appearance: Normal appearance.   HENT:      Head: Normocephalic and atraumatic.      Right Ear: External ear normal.      Left Ear: External ear normal.   Cardiovascular:      Rate and Rhythm: Normal rate and regular rhythm.      Pulses: Normal pulses.      Heart sounds: Normal heart sounds.   Pulmonary:      Effort: Pulmonary effort is normal.      Breath sounds: Normal breath sounds.   Abdominal:      General: Abdomen is flat. Bowel sounds are normal. There is no distension.      Palpations: Abdomen is soft. There is no mass.      Tenderness: There is no abdominal tenderness. There is left CVA tenderness. There is no right CVA tenderness.      Hernia: No hernia is present.   Skin:     General: Skin is warm and dry.   Neurological:      Mental Status: She is alert and oriented to person, place, and time.   Psychiatric:         Mood and Affect: Mood normal.           Assessment & Plan:   1. Dysuria (Primary)  -     Urine Culture, Routine; Future; Expected date: 11/07/2024  -     Fluconazole; Take 1 tablet (200 mg total) by mouth every third day.  Dispense: 2 tablet; Refill: 0  2. Low back pain, unspecified back pain laterality, unspecified chronicity, unspecified whether sciatica present  -     Urine Culture, Routine; Future; Expected date: 11/07/2024  -     XR ABDOMEN (1 VIEW) (CPT=74018); Future; Expected date: 11/07/2024  3. Hematuria, unspecified type  -     XR ABDOMEN (1 VIEW) (CPT=74018); Future; Expected date: 11/07/2024  4. Vaginal itching  -     Fluconazole; Take 1 tablet (200 mg total) by mouth every third day.  Dispense: 2 tablet; Refill: 0    1.  Dysuria  Continued dysuria after treatment of uti  Completed abx  Ua in office large blood but just started cycle  Urine culture sent  Has burning and itching  Start fluconaozle x 2  KUB r/o stone for back pain  - Urine Culture, Routine [E]; Future  - fluconazole 200 MG Oral Tab; Take 1 tablet (200 mg total) by mouth every third day.  Dispense: 2 tablet; Refill: 0    2. Low back pain, unspecified back pain laterality, unspecified chronicity, unspecified whether sciatica present  Urine culture to rule out infection  Push fluids  Kub to r/o stone  - Urine Culture, Routine [E]; Future  - XR ABDOMEN (1 VIEW) (CPT=74018); Future    3. Hematuria, unspecified type  Urine culture sent  Kub to rule out stone  - XR ABDOMEN (1 VIEW) (CPT=74018); Future    4. Vaginal itching  Vaginal itching and burning with urination afer abx  Start fluconazole q72 h x2   - fluconazole 200 MG Oral Tab; Take 1 tablet (200 mg total) by mouth every third day.  Dispense: 2 tablet; Refill: 0    Patient aware of plan of care. All questions answered to satisfaction of the patient. Patient instructed to call office or reach out via [employer] if any issues arise. For urgent issues and/or reviewed red flags please proceed to the urgent care or ER.  Also, inform the nurse practitioner with any new symptoms or medication side effects.      Return if symptoms worsen or fail to improve, for Annual physical.    Gurpreet White, PAULA, 11/6/2024, 8:32 PM

## 2024-11-08 ENCOUNTER — HOSPITAL ENCOUNTER (OUTPATIENT)
Dept: GENERAL RADIOLOGY | Age: 34
Discharge: HOME | End: 2024-11-08
Payer: COMMERCIAL

## 2024-11-08 DIAGNOSIS — M54.50 LOW BACK PAIN, UNSPECIFIED BACK PAIN LATERALITY, UNSPECIFIED CHRONICITY, UNSPECIFIED WHETHER SCIATICA PRESENT: ICD-10-CM

## 2024-11-08 DIAGNOSIS — R31.9 HEMATURIA, UNSPECIFIED TYPE: ICD-10-CM

## 2024-11-08 PROCEDURE — 74018 RADEX ABDOMEN 1 VIEW: CPT

## 2024-11-08 NOTE — PROGRESS NOTES
Full Name & , verify w/ pt  Informed of below msg, pt understood and had no further questions    La radiografía de riñones, uréteres y vejiga no muestra cálculos renales.  Continúe con fluconazol según lo prescrito.  Si los síntomas persisten, hágamelo saber. Laura

## 2024-11-09 DIAGNOSIS — R31.9 URINARY TRACT INFECTION WITH HEMATURIA, SITE UNSPECIFIED: Primary | ICD-10-CM

## 2024-11-09 DIAGNOSIS — N39.0 URINARY TRACT INFECTION WITH HEMATURIA, SITE UNSPECIFIED: Primary | ICD-10-CM

## 2024-11-09 RX ORDER — CEPHALEXIN 500 MG/1
500 CAPSULE ORAL 2 TIMES DAILY
Qty: 10 CAPSULE | Refills: 0 | Status: SHIPPED | OUTPATIENT
Start: 2024-11-09 | End: 2024-11-14

## 2024-11-14 ENCOUNTER — OFFICE VISIT (OUTPATIENT)
Dept: FAMILY MEDICINE CLINIC | Facility: CLINIC | Age: 34
End: 2024-11-14

## 2024-11-14 VITALS
BODY MASS INDEX: 29 KG/M2 | HEART RATE: 78 BPM | SYSTOLIC BLOOD PRESSURE: 119 MMHG | WEIGHT: 157 LBS | DIASTOLIC BLOOD PRESSURE: 78 MMHG

## 2024-11-14 DIAGNOSIS — H92.03 REFERRED OTALGIA, BILATERAL: Primary | ICD-10-CM

## 2024-11-14 DIAGNOSIS — H93.13 BILATERAL TINNITUS: ICD-10-CM

## 2024-11-14 PROCEDURE — 3078F DIAST BP <80 MM HG: CPT | Performed by: FAMILY MEDICINE

## 2024-11-14 PROCEDURE — 99214 OFFICE O/P EST MOD 30 MIN: CPT | Performed by: FAMILY MEDICINE

## 2024-11-14 PROCEDURE — G2211 COMPLEX E/M VISIT ADD ON: HCPCS | Performed by: FAMILY MEDICINE

## 2024-11-14 PROCEDURE — 3074F SYST BP LT 130 MM HG: CPT | Performed by: FAMILY MEDICINE

## 2024-11-14 RX ORDER — LEVOCETIRIZINE DIHYDROCHLORIDE 5 MG/1
5 TABLET, FILM COATED ORAL EVERY EVENING
Qty: 30 TABLET | Refills: 0 | Status: SHIPPED | OUTPATIENT
Start: 2024-11-14

## 2024-11-14 NOTE — PROGRESS NOTES
Chief Complaint   Patient presents with    Ear Problem     C/o right ear pain x 10 months       HPI:   Tameka Monet is a 34 year old female who presents to clinic with complaints of tinnitus in the R ear and occasional otalgia.   Does note that there is also an accompanying headache. Occ gets migraines.   Denies TMJ discomfort or recent infection.     Did take some antibiotics recently.       REVIEW OF SYSTEMS:   Negative, except per HPI.     EXAM:   /78 (BP Location: Left arm, Patient Position: Sitting, Cuff Size: adult)   Pulse 78   Wt 157 lb (71.2 kg)   LMP 11/07/2024 (Approximate)   BMI 28.72 kg/m²   Body mass index is 28.72 kg/m².  GENERAL: well developed, well nourished, in no apparent distress  SKIN: no rashes, no suspicious lesions  HEENT: atraumatic, normocephalic, bilateral TM normal appearing wo wax or TM erythema  EYES: PERRLA, EOMI,conjunctiva are clear  NECK: supple, no adenopathy    ASSESSMENT AND PLAN:     1. Referred otalgia, bilateral  - levocetirizine 5 MG Oral Tab; Take 1 tablet (5 mg total) by mouth every evening.  Dispense: 30 tablet; Refill: 0  - ENT Referral - [facility])    2. Bilateral tinnitus  - trial xyzal and if no improvement can see ENT.   - levocetirizine 5 MG Oral Tab; Take 1 tablet (5 mg total) by mouth every evening.  Dispense: 30 tablet; Refill: 0  - ENT Referral - [facility])     RTC if no improvement in symptoms. Red flags discussed to go to ER.     Brionna Oneill MD  11/14/2024  2:48 PM

## 2024-11-22 ENCOUNTER — OFFICE VISIT (OUTPATIENT)
Dept: OTOLARYNGOLOGY | Facility: CLINIC | Age: 34
End: 2024-11-22

## 2024-11-22 ENCOUNTER — OFFICE VISIT (OUTPATIENT)
Dept: AUDIOLOGY | Facility: CLINIC | Age: 34
End: 2024-11-22

## 2024-11-22 VITALS — WEIGHT: 157 LBS | BODY MASS INDEX: 29 KG/M2

## 2024-11-22 DIAGNOSIS — H93.13 BILATERAL TINNITUS: Primary | ICD-10-CM

## 2024-11-22 DIAGNOSIS — H93.19 TINNITUS, UNSPECIFIED LATERALITY: Primary | ICD-10-CM

## 2024-11-22 DIAGNOSIS — H92.03 OTALGIA OF BOTH EARS: ICD-10-CM

## 2024-11-22 DIAGNOSIS — H93.13 TINNITUS, BILATERAL: ICD-10-CM

## 2024-11-22 PROCEDURE — 92567 TYMPANOMETRY: CPT | Performed by: AUDIOLOGIST

## 2024-11-22 PROCEDURE — 92557 COMPREHENSIVE HEARING TEST: CPT | Performed by: AUDIOLOGIST

## 2024-11-22 PROCEDURE — 99203 OFFICE O/P NEW LOW 30 MIN: CPT | Performed by: OTOLARYNGOLOGY

## 2024-11-22 RX ORDER — CELECOXIB 200 MG/1
200 CAPSULE ORAL DAILY PRN
Qty: 30 CAPSULE | Refills: 0 | Status: SHIPPED | OUTPATIENT
Start: 2024-11-22 | End: 2024-12-22

## 2024-11-22 RX ORDER — CYCLOBENZAPRINE HCL 5 MG
5 TABLET ORAL NIGHTLY
Qty: 30 TABLET | Refills: 1 | Status: SHIPPED | OUTPATIENT
Start: 2024-11-22

## 2024-11-22 NOTE — PROGRESS NOTES
Tameka Monet is a 34 year old female.    Chief Complaint   Patient presents with    Ear Problem     Patient is here for otalgia bilateral  from the right side reports popping and pain. X 1 year.reports ringing on both ears.       HISTORY OF PRESENT ILLNESS  She presents today with bilateral otalgia and on the right side ear keeps popping and ringing in the ears on and off for both ears.  Based on her symptoms hearing test was performed demonstrating normal hearing in both ears which is symmetric normal tympanograms and middle ear pressures and speech discrimination scores.  Here for further evaluation and management.  Denies grinding clenching behavior but does complain of right sided migraines that involve the right side of her face and ear.  Having popping and discomfort in her ear.  Dr. Oneill for my opinion regarding her ear symptoms.      Social History     Socioeconomic History    Marital status:    Tobacco Use    Smoking status: Never     Passive exposure: Never    Smokeless tobacco: Never   Vaping Use    Vaping status: Never Used   Substance and Sexual Activity    Alcohol use: Not Currently     Comment: None.     Drug use: Never    Sexual activity: Yes     Partners: Male       Family History   Problem Relation Age of Onset    Diabetes Maternal Uncle     Glaucoma Neg        Past Medical History:    Gestational diabetes (HCC)    Human papilloma virus infection    Migraine headache    Varicose veins of both lower extremities       Past Surgical History:   Procedure Laterality Date    Colposcopy, cervix w/upper adjacent vagina; w/biopsy(s), cervix           REVIEW OF SYSTEMS    System Neg/Pos Details   Constitutional Negative Fatigue, fever and weight loss.   ENMT Negative Drooling.   Eyes Negative Blurred vision and vision changes.   Respiratory Negative Dyspnea and wheezing.   Cardio Negative Chest pain, irregular heartbeat/palpitations and syncope.   GI Negative Abdominal pain and diarrhea.    Endocrine Negative Cold intolerance and heat intolerance.   Neuro Negative Tremors.   Psych Negative Anxiety and depression.   Integumentary Negative Frequent skin infections, pigment change and rash.   Hema/Lymph Negative Easy bleeding and easy bruising.           PHYSICAL EXAM    Wt 157 lb (71.2 kg)   LMP 11/07/2024 (Approximate)   BMI 28.72 kg/m²        Constitutional Normal Overall appearance - Normal.   Psychiatric Normal Orientation - Oriented to time, place, person & situation. Appropriate mood and affect.   Neck Exam Normal Inspection - Normal. Palpation - Normal. Parotid gland - Normal. Thyroid gland - Normal.   Eyes Normal Conjunctiva - Right: Normal, Left: Normal. Pupil - Right: Normal, Left: Normal. Fundus - Right: Normal, Left: Normal.   Neurological Normal Memory - Normal. Cranial nerves - Cranial nerves II through XII grossly intact.   Head/Face Normal Facial features - Normal. Eyebrows - Normal. Skull - Normal.        Nasopharynx Normal External nose - Normal. Lips/teeth/gums - Normal. Tonsils - Normal. Oropharynx - Normal.   Ears Normal Inspection - Right: Normal, Left: Normal. Canal - Right: Normal, Left: Normal. TM - Right: Normal, Left: Normal.   Skin Normal Inspection - Normal.        Lymph Detail Normal Submental. Submandibular. Anterior cervical. Posterior cervical. Supraclavicular.   TMJ  Tender to palpation bilaterally   Nose/Mouth/Throat Normal External nose - Normal. Lips/teeth/gums - Normal. Tonsils - Normal. Oropharynx - Normal.   Nose/Mouth/Throat Normal Nares - Right: Normal Left: Normal. Septum -Normal  Turbinates - Right: Normal, Left: Normal.       Current Outpatient Medications:     levocetirizine 5 MG Oral Tab, Take 1 tablet (5 mg total) by mouth every evening., Disp: 30 tablet, Rfl: 0    celecoxib 200 MG Oral Cap, Take 1 capsule (200 mg total) by mouth daily as needed for Pain., Disp: 30 capsule, Rfl: 0    cyclobenzaprine 5 MG Oral Tab, Take 1 tablet (5 mg total) by mouth  nightly., Disp: 30 tablet, Rfl: 1    fluconazole 200 MG Oral Tab, Take 1 tablet (200 mg total) by mouth every third day. (Patient not taking: Reported on 11/22/2024), Disp: 2 tablet, Rfl: 0    amoxicillin 875 MG Oral Tab, Take 1 tablet (875 mg total) by mouth 2 (two) times daily. (Patient not taking: Reported on 11/22/2024), Disp: , Rfl:     IUD'S IU, by Intrauterine route. (Patient not taking: Reported on 11/22/2024), Disp: , Rfl:     ketotifen 0.035 % Ophthalmic Solution, Place 2 drops into both eyes 2 (two) times daily. (Patient not taking: Reported on 11/22/2024), Disp: 5 mL, Rfl: 3    SUMAtriptan Succinate 100 MG Oral Tab, Use at onset; repeat once after 2 HRS-ONLY 2 IN 24 HR MAX.  This is a 30 day supply. (Patient not taking: Reported on 11/22/2024), Disp: 9 tablet, Rfl: 5    ibuprofen 800 MG Oral Tab, Take 1 tablet (800 mg total) by mouth every 6 (six) hours as needed for Pain. (Patient not taking: Reported on 11/22/2024), Disp: , Rfl:     Ascorbic Acid (VITAMIN C OR), Take by mouth. (Patient not taking: Reported on 11/22/2024), Disp: , Rfl:     Linolenic Acid (OMEGA 3 OR), Take by mouth. (Patient not taking: Reported on 11/22/2024), Disp: , Rfl:     Multiple Vitamin (MULTI-VITAMIN DAILY) Oral Tab, Take by mouth. (Patient not taking: Reported on 11/22/2024), Disp: , Rfl:   ASSESSMENT AND PLAN    1. Tinnitus, unspecified laterality  - Audiology Referral - [place] ([facility])    2. Otalgia of both ears  Normal hearing on audiometry.  Symptoms appear to be more musculoskeletal in nature.  Start Celebrex cyclobenzaprine warm heat soft diet chewing both sides of mouth and I did recommend that she meet with her dentist to see whether or not she would benefit from some type of a bite guard in the future.        This note was prepared using [employer] voice recognition dictation software. As a result errors may occur. When identified these errors have been corrected. While every attempt is made to  correct errors during dictation discrepancies may still exist    Sid Iglesias MD    11/22/2024    2:44 PM

## 2024-12-21 ENCOUNTER — HOSPITAL ENCOUNTER (OUTPATIENT)
Age: 34
Discharge: HOME | End: 2024-12-21
Payer: COMMERCIAL

## 2024-12-21 VITALS
OXYGEN SATURATION: 98 % | DIASTOLIC BLOOD PRESSURE: 76 MMHG | RESPIRATION RATE: 18 BRPM | TEMPERATURE: 99 F | SYSTOLIC BLOOD PRESSURE: 110 MMHG | HEART RATE: 104 BPM

## 2024-12-21 DIAGNOSIS — J02.9 ACUTE PHARYNGITIS, UNSPECIFIED ETIOLOGY: ICD-10-CM

## 2024-12-21 DIAGNOSIS — R05.9 COUGH: ICD-10-CM

## 2024-12-21 DIAGNOSIS — H66.92 LEFT ACUTE OTITIS MEDIA: Primary | ICD-10-CM

## 2024-12-21 LAB
S PYO AG THROAT QL: NEGATIVE
SARS-COV-2 RNA RESP QL NAA+PROBE: NOT DETECTED

## 2024-12-21 PROCEDURE — 99214 OFFICE O/P EST MOD 30 MIN: CPT | Performed by: PHYSICIAN ASSISTANT

## 2024-12-21 PROCEDURE — 87880 STREP A ASSAY W/OPTIC: CPT | Performed by: PHYSICIAN ASSISTANT

## 2024-12-21 PROCEDURE — U0002 COVID-19 LAB TEST NON-CDC: HCPCS | Performed by: PHYSICIAN ASSISTANT

## 2024-12-21 RX ORDER — IBUPROFEN 600 MG/1
TABLET, FILM COATED ORAL
Qty: 20 TABLET | Refills: 0 | Status: SHIPPED | OUTPATIENT
Start: 2024-12-21

## 2024-12-21 NOTE — ED PROVIDER NOTES
Patient Seen in: [facility]    History     Chief Complaint   Patient presents with    Cough/URI     Stated Complaint: sore throat, body aches    HPI    Tameka Monet is a 34 year old female presents with chief complaint of sore throat.  Onset yesterday.  Patient reports associated generalized bodyaches, cough and earache.  Patient states they are tolerating solid food and oral liquids.  Patient denies fever, chills, otorrhea, trismus, drooling, neck pain, restricted neck movement, neck swelling, rash, dyspnea, wheeze, abdominal pain, nausea, vomiting, diarrhea, constipation.      Past Medical History:    Gestational diabetes (HCC)    Human papilloma virus infection    Migraine headache    Varicose veins of both lower extremities       Past Surgical History:   Procedure Laterality Date    Colposcopy, cervix w/upper adjacent vagina; w/biopsy(s), cervix              Family History   Problem Relation Age of Onset    Diabetes Maternal Uncle     Glaucoma Neg        Social History     Socioeconomic History    Marital status:    Tobacco Use    Smoking status: Never     Passive exposure: Never    Smokeless tobacco: Never   Vaping Use    Vaping status: Never Used   Substance and Sexual Activity    Alcohol use: Not Currently     Comment: None.     Drug use: Never    Sexual activity: Yes     Partners: Male     Social Drivers of Health     Food Insecurity: No Food Insecurity (9/18/2024)    Received from [facility]    Food Insecurity     Within the past 30 days, I worried whether my food would run out before I got money to buy more. / En los últimos 30 días, me preocupó que la comida se podía acabar antes de tener dinero para compr...: Never true / Nunca     Within the past 30 days, the food that I bought just didn't last, and I didn't have money to get more. / En los últimos 30 días, La comida que compré no rindió lo suficiente, y no tenía dinero para...: Never true / Nunca        Review of Systems    Positive for stated complaint: sore throat, body aches  Other systems are as noted in HPI.  Constitutional and vital signs reviewed.      All other systems reviewed and negative except as noted above.    PSFH elements reviewed from today and agreed except as otherwise stated in HPI.    Physical Exam     ED Triage Vitals [12/21/24 1100]   /76   Pulse 104   Resp 18   Temp 98.6 °F (37 °C)   Temp src Oral   SpO2 98 %   O2 Device None (Room air)       Current:/76   Pulse 104   Temp 98.6 °F (37 °C) (Oral)   Resp 18   LMP 12/20/2024 (Approximate)   SpO2 98%     PULSE OX within normal limits on room air as interpreted by this provider.     Constitutional: The patient is cooperative. Appears well-developed and well-nourished.  Mild discomfort.  Psychological: Alert, No abnormalities of mood, affect.  Head: Normocephalic/atraumatic.    Eyes: Pupils are equal round reactive to light.  Conjunctiva are within normal limits.  ENT: Pharynx injected.  Tonsils within normal size limits bilaterally.  No tonsillar exudates.  Uvula midline.  No trismus.  No drooling.  Left TM injected.  Purulent fluid present proximally to intact left TM.  Right TM within normal limits.  Auditory canals within normal limits bilaterally.  No post auricular tenderness, adenopathy or erythema.  No otorrhea.  Mucous membranes moist.  Neck: The neck is supple.  Nontender.  No meningeal signs.  Chest: There is no tenderness to the chest wall.  No CVA tenderness bilaterally.  Respiratory: Respiratory effort was normal.  There is no rales, wheezes, or rhonchi.  There is no stridor.  Air entry is equal.  Cardiovascular: Tachycardic, regular rhythm.  Capillary refill is brisk.    Genitourinary: Not examined.  Lymphatic: No gross lymphadenopathy noted.  Musculoskeletal: Musculoskeletal system is grossly intact.  There is no obvious deformity.  Neurological: Gross motor movement is intact in all 4 extremities.  Patient  exhibits normal speech.  Skin: Skin is normal to inspection.  Warm and dry.  No obvious bruising.  No obvious rash.        ED Course     Labs Reviewed   POCT RAPID STREP - Normal   RAPID SARS-COV-2 BY PCR - Normal   GRP A STREP CULT, THROAT       MDM      via [employer] utilized to communicate with patient.    Differential diagnosis prior to work-up including but not limited to strep pharyngitis, viral pharyngitis, URI, otitis media, otitis externa, cerumen impaction, bronchitis, pneumonia    COVID-19 negative.  Rapid strep negative.    Physical exam remained stable over serial reexaminations as previously documented.  Results reviewed with patient.    I have given the patient instructions regarding their diagnoses, expectations, follow up, and ER precautions. I explained to the patient that emergent conditions may arise and to go to the ER for new, worsening or any persistent conditions. I've explained the importance of following up with their doctor as instructed. The patient verbalized understanding of the discharge instructions and plan.        Disposition and Plan     Clinical Impression:  1. Left acute otitis media    2. Cough    3. Acute pharyngitis, unspecified etiology        Disposition:  Discharge    Follow-up:  Brionna Oneill MD  [address] [place] [address]  [address]  [place] [address]  [phone]    Call in 1 day  For follow-up      Medications Prescribed:  Current Discharge Medication List        START taking these medications    Details   !! ibuprofen 600 MG Oral Tab Take 1 tablet (600 mg total) by mouth every 6 hours with food  Qty: 20 tablet, Refills: 0      phenol 1.4 % Mouth/Throat Liquid Use as directed 1 mL in the mouth or throat every 2 (two) hours as needed. For 5 days  Qty: 177 mL, Refills: 0      amoxicillin clavulanate 875-125 MG Oral Tab Take 1 tablet by mouth 2 (two) times daily for 7 days.  Qty: 14 tablet, Refills: 0       !! - Potential duplicate medications found. Please  discuss with provider.

## 2025-04-17 ENCOUNTER — LAB ENCOUNTER (OUTPATIENT)
Dept: LAB | Age: 35
End: 2025-04-17
Attending: FAMILY MEDICINE
Payer: COMMERCIAL

## 2025-04-17 ENCOUNTER — OFFICE VISIT (OUTPATIENT)
Dept: FAMILY MEDICINE CLINIC | Facility: CLINIC | Age: 35
End: 2025-04-17

## 2025-04-17 VITALS
HEART RATE: 61 BPM | DIASTOLIC BLOOD PRESSURE: 79 MMHG | SYSTOLIC BLOOD PRESSURE: 127 MMHG | BODY MASS INDEX: 28 KG/M2 | WEIGHT: 155 LBS

## 2025-04-17 DIAGNOSIS — Z00.00 ENCOUNTER FOR ANNUAL HEALTH EXAMINATION: ICD-10-CM

## 2025-04-17 DIAGNOSIS — Z00.00 ENCOUNTER FOR ANNUAL HEALTH EXAMINATION: Primary | ICD-10-CM

## 2025-04-17 LAB
ALBUMIN SERPL-MCNC: 4.7 G/DL (ref 3.2–4.8)
ALBUMIN/GLOB SERPL: 1.7 {RATIO} (ref 1–2)
ALP LIVER SERPL-CCNC: 56 U/L (ref 37–98)
ALT SERPL-CCNC: 14 U/L (ref 10–49)
ANION GAP SERPL CALC-SCNC: 10 MMOL/L (ref 0–18)
AST SERPL-CCNC: 15 U/L (ref ?–34)
BASOPHILS # BLD AUTO: 0.04 X10(3) UL (ref 0–0.2)
BASOPHILS NFR BLD AUTO: 0.4 %
BILIRUB SERPL-MCNC: 0.2 MG/DL (ref 0.3–1.2)
BUN BLD-MCNC: 15 MG/DL (ref 9–23)
BUN/CREAT SERPL: 23.1 (ref 10–20)
CALCIUM BLD-MCNC: 9.1 MG/DL (ref 8.7–10.4)
CHLORIDE SERPL-SCNC: 105 MMOL/L (ref 98–112)
CHOLEST SERPL-MCNC: 164 MG/DL (ref ?–200)
CO2 SERPL-SCNC: 26 MMOL/L (ref 21–32)
CREAT BLD-MCNC: 0.65 MG/DL (ref 0.55–1.02)
DEPRECATED RDW RBC AUTO: 42.8 FL (ref 35.1–46.3)
EGFRCR SERPLBLD CKD-EPI 2021: 118 ML/MIN/1.73M2 (ref 60–?)
EOSINOPHIL # BLD AUTO: 0.05 X10(3) UL (ref 0–0.7)
EOSINOPHIL NFR BLD AUTO: 0.5 %
ERYTHROCYTE [DISTWIDTH] IN BLOOD BY AUTOMATED COUNT: 14.3 % (ref 11–15)
EST. AVERAGE GLUCOSE BLD GHB EST-MCNC: 120 MG/DL (ref 68–126)
FASTING PATIENT LIPID ANSWER: NO
FASTING STATUS PATIENT QL REPORTED: NO
GLOBULIN PLAS-MCNC: 2.8 G/DL (ref 2–3.5)
GLUCOSE BLD-MCNC: 82 MG/DL (ref 70–99)
HBA1C MFR BLD: 5.8 % (ref ?–5.7)
HCT VFR BLD AUTO: 37.5 % (ref 35–48)
HDLC SERPL-MCNC: 44 MG/DL (ref 40–59)
HGB BLD-MCNC: 12.1 G/DL (ref 12–16)
IMM GRANULOCYTES # BLD AUTO: 0.02 X10(3) UL (ref 0–1)
IMM GRANULOCYTES NFR BLD: 0.2 %
LDLC SERPL CALC-MCNC: 90 MG/DL (ref ?–100)
LYMPHOCYTES # BLD AUTO: 2.82 X10(3) UL (ref 1–4)
LYMPHOCYTES NFR BLD AUTO: 31 %
MCH RBC QN AUTO: 26.7 PG (ref 26–34)
MCHC RBC AUTO-ENTMCNC: 32.3 G/DL (ref 31–37)
MCV RBC AUTO: 82.8 FL (ref 80–100)
MONOCYTES # BLD AUTO: 0.43 X10(3) UL (ref 0.1–1)
MONOCYTES NFR BLD AUTO: 4.7 %
NEUTROPHILS # BLD AUTO: 5.75 X10 (3) UL (ref 1.5–7.7)
NEUTROPHILS # BLD AUTO: 5.75 X10(3) UL (ref 1.5–7.7)
NEUTROPHILS NFR BLD AUTO: 63.2 %
NONHDLC SERPL-MCNC: 120 MG/DL (ref ?–130)
OSMOLALITY SERPL CALC.SUM OF ELEC: 292 MOSM/KG (ref 275–295)
PLATELET # BLD AUTO: 327 10(3)UL (ref 150–450)
POTASSIUM SERPL-SCNC: 3.5 MMOL/L (ref 3.5–5.1)
PROT SERPL-MCNC: 7.5 G/DL (ref 5.7–8.2)
RBC # BLD AUTO: 4.53 X10(6)UL (ref 3.8–5.3)
SODIUM SERPL-SCNC: 141 MMOL/L (ref 136–145)
TRIGL SERPL-MCNC: 177 MG/DL (ref 30–149)
TSI SER-ACNC: 0.93 UIU/ML (ref 0.55–4.78)
VLDLC SERPL CALC-MCNC: 29 MG/DL (ref 0–30)
WBC # BLD AUTO: 9.1 X10(3) UL (ref 4–11)

## 2025-04-17 PROCEDURE — 36415 COLL VENOUS BLD VENIPUNCTURE: CPT

## 2025-04-17 PROCEDURE — 83036 HEMOGLOBIN GLYCOSYLATED A1C: CPT

## 2025-04-17 PROCEDURE — 80053 COMPREHEN METABOLIC PANEL: CPT

## 2025-04-17 PROCEDURE — 80061 LIPID PANEL: CPT

## 2025-04-17 PROCEDURE — 85025 COMPLETE CBC W/AUTO DIFF WBC: CPT

## 2025-04-17 PROCEDURE — 84443 ASSAY THYROID STIM HORMONE: CPT

## 2025-04-17 NOTE — PROGRESS NOTES
HPI:   Tameka Monet is a 34 year old female who presents for a complete physical exam.  Pasha Diana stopped Pasha Diana Maltese therapist of  + occ palpitations     Wt Readings from Last 3 Encounters:   04/17/25 155 lb (70.3 kg)   11/22/24 157 lb (71.2 kg)   11/14/24 157 lb (71.2 kg)     Body mass index is 28.35 kg/m².       Current Medications[1]   Past Medical History[2]   Past Surgical History[3]   Family History[4]   Social History:   Short Social Hx on File[5]       REVIEW OF SYSTEMS:   Negative, except per HPI.     EXAM:   /79   Pulse 61   Wt 155 lb (70.3 kg)   LMP 12/20/2024 (Approximate)   BMI 28.35 kg/m²     GENERAL: well developed, well nourished, in no apparent distress  SKIN: no rashes, no suspicious lesions  HEENT: atraumatic, normocephalic, ears are clear  EYES: PERRLA, EOMI,conjunctiva are clear  NECK: supple, no adenopathy  LUNGS: clear to auscultation  CARDIO: RRR without murmur  GI: good BS, no masses or tenderness  MUSCULOSKELETAL: back is not tender, FROM of the back  EXTREMITIES: no cyanosis or edema  NEURO: Oriented times three, cranial nerves are intact, motor and sensory are grossly intact    ASSESSMENT AND PLAN:   Tameka Monet is a 34 year old female who presents for a complete physical exam.    1. Encounter for annual health examination  -Medical, surgical and social history, as well as medications and allergies were reviewed with patient.  - CBC With Differential With Platelet; Future  - Comp Metabolic Panel (14); Future  - Hemoglobin A1C; Future  - Lipid Panel; Future  - TSH W Reflex To Free T4; Future       Brionna Oneill MD  4/17/2025  2:10 PM           [1]   Current Outpatient Medications   Medication Sig Dispense Refill    ibuprofen 600 MG Oral Tab Take 1 tablet (600 mg total) by mouth every 6 hours with food 20 tablet 0    phenol 1.4 % Mouth/Throat Liquid Use as directed 1 mL in the mouth or throat every 2 (two) hours as needed. For 5 days 177 mL 0     cyclobenzaprine 5 MG Oral Tab Take 1 tablet (5 mg total) by mouth nightly. 30 tablet 1    levocetirizine 5 MG Oral Tab Take 1 tablet (5 mg total) by mouth every evening. 30 tablet 0    amoxicillin 875 MG Oral Tab Take 1 tablet (875 mg total) by mouth 2 (two) times daily. (Patient not taking: Reported on 11/22/2024)      IUD'S IU by Intrauterine route. (Patient not taking: Reported on 11/22/2024)      ketotifen 0.035 % Ophthalmic Solution Place 2 drops into both eyes 2 (two) times daily. (Patient not taking: Reported on 11/22/2024) 5 mL 3    SUMAtriptan Succinate 100 MG Oral Tab Use at onset; repeat once after 2 HRS-ONLY 2 IN 24 HR MAX.  This is a 30 day supply. (Patient not taking: Reported on 11/22/2024) 9 tablet 5    ibuprofen 800 MG Oral Tab Take 1 tablet (800 mg total) by mouth every 6 (six) hours as needed for Pain. (Patient not taking: Reported on 11/22/2024)      Ascorbic Acid (VITAMIN C OR) Take by mouth. (Patient not taking: Reported on 11/22/2024)      Linolenic Acid (OMEGA 3 OR) Take by mouth. (Patient not taking: Reported on 11/22/2024)      Multiple Vitamin (MULTI-VITAMIN DAILY) Oral Tab Take by mouth. (Patient not taking: Reported on 11/22/2024)     [2]   Past Medical History:   Gestational diabetes (HCC)    Human papilloma virus infection    Migraine headache    Varicose veins of both lower extremities   [3]   Past Surgical History:  Procedure Laterality Date    Colposcopy, cervix w/upper adjacent vagina; w/biopsy(s), cervix     [4]   Family History  Problem Relation Age of Onset    Diabetes Maternal Uncle     Glaucoma Neg    [5]   Social History  Socioeconomic History    Marital status:    Tobacco Use    Smoking status: Never     Passive exposure: Never    Smokeless tobacco: Never   Vaping Use    Vaping status: Never Used   Substance and Sexual Activity    Alcohol use: Not Currently     Comment: None.     Drug use: Never    Sexual activity: Yes     Partners: Male     Social Drivers of Health      Food Insecurity: No Food Insecurity (9/18/2024)    Received from [facility]    Food Insecurity     Within the past 30 days, I worried whether my food would run out before I got money to buy more. / En los últimos 30 días, me preocupó que la comida se podía acabar antes de tener dinero para compr...: Never true / Nunca     Within the past 30 days, the food that I bought just didn't last, and I didn't have money to get more. / En los últimos 30 días, La comida que compré no rindió lo suficiente, y no tenía dinero para...: Never true / Nunca

## 2025-04-24 ENCOUNTER — APPOINTMENT (OUTPATIENT)
Dept: ULTRASOUND IMAGING | Age: 35
End: 2025-04-24
Attending: NURSE PRACTITIONER
Payer: COMMERCIAL

## 2025-04-24 ENCOUNTER — APPOINTMENT (OUTPATIENT)
Dept: CT IMAGING | Facility: HOSPITAL | Age: 35
End: 2025-04-24
Attending: NURSE PRACTITIONER
Payer: COMMERCIAL

## 2025-04-24 ENCOUNTER — HOSPITAL ENCOUNTER (OUTPATIENT)
Age: 35
Discharge: HOME | End: 2025-04-24
Attending: NURSE PRACTITIONER
Payer: COMMERCIAL

## 2025-04-24 VITALS
OXYGEN SATURATION: 100 % | DIASTOLIC BLOOD PRESSURE: 72 MMHG | RESPIRATION RATE: 18 BRPM | HEART RATE: 72 BPM | SYSTOLIC BLOOD PRESSURE: 107 MMHG | TEMPERATURE: 98 F

## 2025-04-24 DIAGNOSIS — R10.2 LEFT ADNEXAL TENDERNESS: ICD-10-CM

## 2025-04-24 DIAGNOSIS — K57.92 ACUTE DIVERTICULITIS: Primary | ICD-10-CM

## 2025-04-24 DIAGNOSIS — R10.824 LEFT LOWER QUADRANT ABDOMINAL TENDERNESS WITH REBOUND TENDERNESS: ICD-10-CM

## 2025-04-24 LAB
#MXD IC: 0.5 X10ˆ3/UL (ref 0.1–1)
B-HCG UR QL: NEGATIVE
BILIRUB UR QL STRIP: NEGATIVE
BUN BLD-MCNC: 12 MG/DL (ref 7–18)
CHLORIDE BLD-SCNC: 103 MMOL/L (ref 98–112)
CO2 BLD-SCNC: 27 MMOL/L (ref 21–32)
COLOR UR: YELLOW
CREAT BLD-MCNC: 0.6 MG/DL (ref 0.55–1.02)
EGFRCR SERPLBLD CKD-EPI 2021: 121 ML/MIN/1.73M2 (ref 60–?)
GLUCOSE BLD-MCNC: 94 MG/DL (ref 70–99)
GLUCOSE UR STRIP-MCNC: NEGATIVE MG/DL
HCT VFR BLD AUTO: 38 % (ref 35–48)
HCT VFR BLD CALC: 41 % (ref 34–50)
HGB BLD-MCNC: 12.9 G/DL (ref 12–16)
ISTAT IONIZED CALCIUM FOR CHEM 8: 1.18 MMOL/L (ref 1.12–1.32)
KETONES UR STRIP-MCNC: NEGATIVE MG/DL
LEUKOCYTE ESTERASE UR QL STRIP: NEGATIVE
LYMPHOCYTES # BLD AUTO: 2.1 X10ˆ3/UL (ref 1–4)
LYMPHOCYTES NFR BLD AUTO: 20 %
MCH RBC QN AUTO: 28.5 PG (ref 26–34)
MCHC RBC AUTO-ENTMCNC: 33.9 G/DL (ref 31–37)
MCV RBC AUTO: 83.9 FL (ref 80–100)
MIXED CELL %: 4.4 %
NEUTROPHILS # BLD AUTO: 8.1 X10ˆ3/UL (ref 1.5–7.7)
NEUTROPHILS NFR BLD AUTO: 75.6 %
NITRITE UR QL STRIP: NEGATIVE
PH UR STRIP: 6.5 [PH]
PLATELET # BLD AUTO: 329 X10ˆ3/UL (ref 150–450)
POTASSIUM BLD-SCNC: 3.8 MMOL/L (ref 3.6–5.1)
PROT UR STRIP-MCNC: 30 MG/DL
RBC # BLD AUTO: 4.53 X10ˆ6/UL (ref 3.8–5.3)
SODIUM BLD-SCNC: 140 MMOL/L (ref 136–145)
SP GR UR STRIP: 1.02
UROBILINOGEN UR STRIP-ACNC: <2 MG/DL
WBC # BLD AUTO: 10.7 X10ˆ3/UL (ref 4–11)

## 2025-04-24 PROCEDURE — 74177 CT ABD & PELVIS W/CONTRAST: CPT | Performed by: NURSE PRACTITIONER

## 2025-04-24 PROCEDURE — 81025 URINE PREGNANCY TEST: CPT | Performed by: NURSE PRACTITIONER

## 2025-04-24 PROCEDURE — 99214 OFFICE O/P EST MOD 30 MIN: CPT | Performed by: NURSE PRACTITIONER

## 2025-04-24 PROCEDURE — 80047 BASIC METABLC PNL IONIZED CA: CPT | Performed by: NURSE PRACTITIONER

## 2025-04-24 PROCEDURE — 81002 URINALYSIS NONAUTO W/O SCOPE: CPT | Performed by: NURSE PRACTITIONER

## 2025-04-24 PROCEDURE — 76830 TRANSVAGINAL US NON-OB: CPT | Performed by: NURSE PRACTITIONER

## 2025-04-24 PROCEDURE — 85025 COMPLETE CBC W/AUTO DIFF WBC: CPT | Performed by: NURSE PRACTITIONER

## 2025-04-24 PROCEDURE — 93975 VASCULAR STUDY: CPT | Performed by: NURSE PRACTITIONER

## 2025-04-24 PROCEDURE — 76856 US EXAM PELVIC COMPLETE: CPT | Performed by: NURSE PRACTITIONER

## 2025-04-24 NOTE — ED PROVIDER NOTES
Chief Complaint   Patient presents with    Abdominal Pain       HPI:     Tameka is a 34 year old female who presents with a chief complaint of left lower quadrant abdominal pain ongoing for 3 days.  Does report some nausea, no vomiting or diarrhea.  Does report constipation, but did have a normal bowel movement this morning.  Reports he felt feverish yesterday, and some weakness in her knees due to the fever.  She is eating and drinking normally.  Urinating normally.  No vaginal discharge.  LMP 4/21/2025    PSFH: PFSH asessment screens reviewed and agree.  Nurses notes reviewed I agree with documentation.    Family History[1]  Family history reviewed with patient/caregiver and is not pertinent to presenting problem.  Social History     Socioeconomic History    Marital status:      Spouse name: Not on file    Number of children: Not on file    Years of education: Not on file    Highest education level: Not on file   Occupational History    Not on file   Tobacco Use    Smoking status: Never     Passive exposure: Never    Smokeless tobacco: Never   Vaping Use    Vaping status: Never Used   Substance and Sexual Activity    Alcohol use: Not Currently     Comment: None.     Drug use: Never    Sexual activity: Yes     Partners: Male   Other Topics Concern    Not on file   Social History Narrative    Not on file     Social Drivers of Health     Food Insecurity: No Food Insecurity (9/18/2024)    Received from [facility]    Food Insecurity     Within the past 30 days, I worried whether my food would run out before I got money to buy more. / En los últimos 30 días, me preocupó que la comida se podía acabar antes de tener dinero para compr...: Never true / Nunca     Within the past 30 days, the food that I bought just didn't last, and I didn't have money to get more. / En los últimos 30 días, La comida que compré no rindió lo suficiente, y no tenía dinero para...: Never true / Nunca   Transportation  Needs: Not on file   Housing Stability: Not on file       ROS:   Positive for stated complaint: LLQ pain  All other systems reviewed and negative except as noted above.  Constitutional and Vital Signs Reviewed.      Physical Exam:     /72   Pulse 72   Temp 98.1 °F (36.7 °C) (Oral)   Resp 18   LMP 12/20/2024 (Approximate)   SpO2 100%   GENERAL: well developed, well nourished, well hydrated, no distress  EYES: sclera non icteric bilateral  HEENT: atraumatic, normocephalic, ears, nose and throat are clear  NECK: supple, no adenopathy  LUNGS: clear to auscultation, no RRW  CARDIO: RRR without murmur  GI: Tender left lower quadrant only.  No rebound or guarding.  Bowel sounds present active in all quadrants.  PELVIC: chaperone Geoffrey Baez RN.  There is left adnexal tenderness.  No right adnexal tenderness.  No CMT.  No discharge.  EXTREMITIES: no cyanosis or edema. THOMPSON without difficulty  SKIN: good skin turgor, no obvious rashes  MDM/Assessment/Plan:   Orders for this encounter:    Orders Placed This Encounter    US PELVIS EV W DOPPLER (CPT=76856/00860/85634)     pain near belly button     What is the Relevant Clinical Indication / Reason for Exam?:   pain near belly button     Release to patient:   Immediate    CT APPENDIX ABD/PEL W CONTRAST (CPT=74177)     pain near belly button  # [id number]    Patient reports LLQ pain x 3 days. Denies n/v/d/f. Patient also reports   \"weakness on my knees\". Denies urinary complaints.        If clinically indicated, CT Protocol includes Oral Contrast. Can Oral Contrast be administered?:   Yes     What is the Relevant Clinical Indication / Reason for Exam?:   pain near belly button     Release to patient:   Immediate    POCT CBC     Release to patient:   Immediate    POCT Urinalysis Dipstick    POCT Pregnancy, Urine    POCT ISTAT chem8 cartridge    Urine Culture, Routine     Specimen source::   Urine, clean catch     Documentation of symptoms of UTI or sepsis::    Documentation of symptoms of UTI or sepsis must be corroborated within the EMR     Release to patient:   Immediate    iStat (Chem 8)    Insert Peripheral IV    POCT Urine Dip    POCT Pregnancy, Urine    iopamidol 76% (ISOVUE-370) injection for power injector    amoxicillin clavulanate 875-125 MG Oral Tab     Sig: Take 1 tablet by mouth in the morning, at noon, and at bedtime for 10 days.     Dispense:  30 tablet     Refill:  0       Labs performed this visit:  No results found for this or any previous visit (from the past 10 hours).       MDM:  Medical Decision Making  Differentials considered: Diverticulitis versus ovarian etiology versus colitis versus other    HPI, exam, CT of the abdomen and pelvis, ultrasound of the pelvis, consistent with acute uncomplicated diverticulitis, along with possibly a ruptured left ovarian cyst.  There is no ovarian torsion. Discussed all incidental findings with patient, and advised pcp follow up regarding diss bulge finding.  Patient is healthy appearing, normal vitals.  CBC unremarkable.  Chem-8 unremarkable.  Urine not consistent with infection however there is moderate blood and protein 30, will send culture and treat accordingly.  Close follow-up with gastroenterology after finishing antibiotic.  ER precautions were discussed.  Patient verbalized understanding and agreeable to plan of care.    Case discussed with Dr. Mariam Mitchell     used for the duration of this visit    Amount and/or Complexity of Data Reviewed  Radiology: ordered and independent interpretation performed.     Details: US pelvis- + complex left ovarian cyst   CT abdomen/pelvis- + sigmoid diverticulitis     Risk  Prescription drug management.          Diagnosis:    ICD-10-CM    1. Acute diverticulitis  K57.92       2. Left adnexal tenderness  R10.2 US PELVIS EV W DOPPLER (CPT=76856/05201/30366)     US PELVIS EV W DOPPLER (CPT=76856/18493/66675)     CT APPENDIX ABD/PEL W CONTRAST (CPT=74177)     CT  APPENDIX ABD/PEL W CONTRAST (CPT=74177)      3. Left lower quadrant abdominal tenderness with rebound tenderness  R10.824 Urine Culture, Routine     Urine Culture, Routine     CT APPENDIX ABD/PEL W CONTRAST (CPT=74177)     CT APPENDIX ABD/PEL W CONTRAST (CPT=74177)          All results reviewed and discussed with patient.  See AVS for detailed discharge instructions for your condition today.    Follow Up with:  Gurpreet Hutchinson MD  [address] [id number] [phone]    Call                   [1]   Family History  Problem Relation Age of Onset    Diabetes Maternal Uncle     Glaucoma Neg

## 2025-04-24 NOTE — DISCHARGE INSTRUCTIONS
Augmentin 1 tablet 3 times a day for 10 days, take with food  ER for any worsening symptoms  Follow-up with gastroenterology after you finish Augmentin

## 2025-04-24 NOTE — ED INITIAL ASSESSMENT (HPI)
# [id number]    Patient reports LLQ pain x 3 days. Denies n/v/d/f. Patient also reports \"weakness on my knees\". Denies urinary complaints.

## 2025-05-05 ENCOUNTER — TELEPHONE (OUTPATIENT)
Age: 35
End: 2025-05-05

## 2025-05-27 ENCOUNTER — TELEPHONE (OUTPATIENT)
Dept: FAMILY MEDICINE CLINIC | Facility: CLINIC | Age: 35
End: 2025-05-27

## 2025-05-29 ENCOUNTER — TELEPHONE (OUTPATIENT)
Dept: FAMILY MEDICINE CLINIC | Facility: CLINIC | Age: 35
End: 2025-05-29

## 2025-05-29 ENCOUNTER — TELEMEDICINE (OUTPATIENT)
Dept: FAMILY MEDICINE CLINIC | Facility: CLINIC | Age: 35
End: 2025-05-29

## 2025-05-29 DIAGNOSIS — Z02.9 ADMINISTRATIVE ENCOUNTER: ICD-10-CM

## 2025-05-29 DIAGNOSIS — R73.03 PREDIABETES: Primary | ICD-10-CM

## 2025-05-29 PROCEDURE — 98005 SYNCH AUDIO-VIDEO EST LOW 20: CPT | Performed by: FAMILY MEDICINE

## 2025-05-29 NOTE — TELEPHONE ENCOUNTER
Patient previously informed provider she wanted form mailed. Unable to upload forms to [employer] as our office does not have that capability. Patient can either come in to office to  or form can be mailed. [employer] message sent informing.

## 2025-05-29 NOTE — PROGRESS NOTES
Virtual Telephone Check-In    Tameka Monet verbally consents to a Virtual/Telephone Check-In service on 05/29/25.    Patient understands and accepts financial responsibility for any deductible, co-insurance and/or co-pays associated with this service.    Duration of the service: 20 minutes  This telemedicine visit was conducted using live audio and video.     Summary of topics discussed:     Patient requesting a follow-up medical report for conditions that may impair driving safely for the DMV.  Patient denies any syncopal episode, seizures, hypoglycemic episodes.  She is not diabetic she is prediabetic and does not currently take any medications.  Patient denies any episodes of medical or mental event or episode that would impair her safe driving ability.  Reports of the last time she was pulled over by a  was over 2 years ago.  She is not sure why they are requesting this medical report.    Physical Exam:  General: alert, speaking in full sentences, no acute distress  Lungs: respirations sound unlabored, no audible wheezing with speaking.  Neurologic: alert, oriented x3    Assessment and plan:    1. Prediabetes  - Diet and lifestyle control.    2. Administrative encounter  -Form filled out for patient and sent via regular mail      Advised to call back clinic if no improvement in symptoms. Red flags discussed to go to ER.     Brionna Oneill MD

## 2025-06-11 ENCOUNTER — OFFICE VISIT (OUTPATIENT)
Dept: FAMILY MEDICINE CLINIC | Facility: CLINIC | Age: 35
End: 2025-06-11

## 2025-06-11 ENCOUNTER — NURSE TRIAGE (OUTPATIENT)
Dept: FAMILY MEDICINE CLINIC | Facility: CLINIC | Age: 35
End: 2025-06-11

## 2025-06-11 VITALS
WEIGHT: 152 LBS | DIASTOLIC BLOOD PRESSURE: 74 MMHG | BODY MASS INDEX: 28 KG/M2 | HEART RATE: 78 BPM | SYSTOLIC BLOOD PRESSURE: 113 MMHG

## 2025-06-11 DIAGNOSIS — R30.0 DYSURIA: ICD-10-CM

## 2025-06-11 DIAGNOSIS — N39.0 FREQUENT UTI: ICD-10-CM

## 2025-06-11 DIAGNOSIS — N30.01 ACUTE CYSTITIS WITH HEMATURIA: Primary | ICD-10-CM

## 2025-06-11 LAB
GLUCOSE (URINE DIPSTICK): 100 MG/DL
KETONES (URINE DIPSTICK): 15 MG/DL
MULTISTIX LOT#: ABNORMAL NUMERIC
NITRITE, URINE: POSITIVE
PH, URINE: 5.5 (ref 4.5–8)
PROTEIN (URINE DIPSTICK): 100 MG/DL
SPECIFIC GRAVITY: 1.01 (ref 1–1.03)
UROBILINOGEN,SEMI-QN: 4 MG/DL (ref 0–1.9)

## 2025-06-11 PROCEDURE — 81003 URINALYSIS AUTO W/O SCOPE: CPT | Performed by: FAMILY MEDICINE

## 2025-06-11 PROCEDURE — 3078F DIAST BP <80 MM HG: CPT | Performed by: FAMILY MEDICINE

## 2025-06-11 PROCEDURE — 3074F SYST BP LT 130 MM HG: CPT | Performed by: FAMILY MEDICINE

## 2025-06-11 PROCEDURE — 99214 OFFICE O/P EST MOD 30 MIN: CPT | Performed by: FAMILY MEDICINE

## 2025-06-11 RX ORDER — CEPHALEXIN 500 MG/1
1000 CAPSULE ORAL 2 TIMES DAILY
Qty: 28 CAPSULE | Refills: 1 | Status: SHIPPED | OUTPATIENT
Start: 2025-06-11 | End: 2025-06-18

## 2025-06-11 NOTE — PROGRESS NOTES
HPI:   Tameka Monet is a 35 year old female who presents with symptoms of UTI    Patient presents with:  Burning On Urination: Started on Sunday, used over the counter pain meds did not help  Patient states that this is her fourth UTI, happens after intercourse  She has had 3 urine cultures, 1 likely contaminated 1 positive for lactobacillus and 1 positive for E. coli, her symptoms have resolved between infections              See ROS below for pertinent positive and negative complaints.    Current Medications[1]  Past Medical History[2]  Past Surgical History[3]  Allergies[4]    REVIEW OF SYSTEMS:   Review of Systems   Constitutional: Negative.  Negative for chills, diaphoresis, fatigue and fever.   HENT: Negative.     Gastrointestinal:  Negative for abdominal distention, abdominal pain, blood in stool, constipation, diarrhea, nausea and vomiting.   Genitourinary:  Positive for dysuria, frequency and urgency. Negative for decreased urine volume, difficulty urinating, enuresis, flank pain, genital sores and hematuria.   Musculoskeletal:  Negative for arthralgias, back pain, joint swelling and myalgias.   Skin:  Negative for pallor and rash.   Neurological:  Negative for dizziness and weakness.   Psychiatric/Behavioral:  Negative for behavioral problems.      EXAM:   /74   Pulse 78   Wt 152 lb (68.9 kg)   LMP 06/05/2025 (Approximate)   BMI 27.80 kg/m²  Estimated body mass index is 27.8 kg/m² as calculated from the following:    Height as of 11/7/24: 5' 2\" (1.575 m).    Weight as of this encounter: 152 lb (68.9 kg).  Physical Exam  Vitals and nursing note reviewed. Exam conducted with a chaperone present.   Constitutional:       General: She is not in acute distress.     Appearance: Normal appearance. She is normal weight. She is not ill-appearing, toxic-appearing or diaphoretic.   HENT:      Head: Normocephalic and atraumatic.      Nose: Nose normal.   Eyes:      Extraocular Movements: Extraocular  movements intact.      Conjunctiva/sclera: Conjunctivae normal.   Cardiovascular:      Rate and Rhythm: Normal rate and regular rhythm.      Heart sounds: Normal heart sounds.   Pulmonary:      Effort: Pulmonary effort is normal.      Breath sounds: Normal breath sounds.   Abdominal:      General: Abdomen is flat. Bowel sounds are normal. There is no distension.      Palpations: Abdomen is soft. There is no mass.      Tenderness: There is no abdominal tenderness. There is no right CVA tenderness, left CVA tenderness, guarding or rebound.   Musculoskeletal:         General: Normal range of motion.      Cervical back: Neck supple. No rigidity.   Skin:     General: Skin is warm.      Findings: No erythema or rash.   Neurological:      General: No focal deficit present.      Mental Status: She is alert and oriented to person, place, and time.      Motor: No weakness.   Psychiatric:         Mood and Affect: Mood normal.         Thought Content: Thought content normal.         Judgment: Judgment normal.         ASSESSMENT AND PLAN:   1. Patient is a 35 year old female who is here for symptoms consistent with UTI, no systemic symptoms, no signs of pyelonephritis on exam    Plan:  Check U/A: positive  Check Urine Clx: f/u pending results, consider prophylactic antibiotics after urine culture/sensitivities final  Follow-up pending the results  Prescriptions given today: cephalexin 1000 mg bid x 7 days  Recommend increase fluids/water intake.  Recommend follow-up with urologist now due to recurrent UTI  Patient has appointment with gynecology scheduled next month, recommend patient keep her appointment for evaluation as well  Follow up if symptoms do not improve in the next 2 days or are worsening or sooner if needed.  See additional recommendations and follow below    1. Acute cystitis with hematuria  - POC Urinalysis, Automated Dip without microscopy (PCA and EMMG ONLY) [10050]  - Urine Culture, Routine; Future  -  cephALEXin 500 MG Oral Cap; Take 2 capsules (1,000 mg total) by mouth 2 (two) times daily for 7 days.  Dispense: 28 capsule; Refill: 1  - UROLOGY - INTERNAL  - Urine Culture, Routine    2. Frequent UTI  - POC Urinalysis, Automated Dip without microscopy (PCA and EMMG ONLY) [[id number]]  - Urine Culture, Routine; Future  - cephALEXin 500 MG Oral Cap; Take 2 capsules (1,000 mg total) by mouth 2 (two) times daily for 7 days.  Dispense: 28 capsule; Refill: 1  - UROLOGY - INTERNAL  - Urine Culture, Routine    3. Dysuria  - POC Urinalysis, Automated Dip without microscopy (PCA and EMMG ONLY) [08574]      Patient (and/or guardian or parent if less than 18 years old) was given instructions regarding diagnosis, management, expectations and follow up.   Patient Instructions   Will contact you/patient when the test(s):   Urine culture result(s) are final and with further recommendations then if any changes.    Recommend:    Keeping well hydrated    Medication(s): cephalexin for UTI    Follow-up:  With me pending the test results or sooner as needed.    Go to the emergency room or call 911 for any new or suddenly worsening symptoms, any signs of acute distress, or emergent changes.      Orders Placed This Encounter   Procedures    POC Urinalysis, Automated Dip without microscopy (PCA and EMMG ONLY) [49058]    Urine Culture, Routine     Meds & Refills for this Visit:  Requested Prescriptions     Signed Prescriptions Disp Refills    cephALEXin 500 MG Oral Cap 28 capsule 1     Sig: Take 2 capsules (1,000 mg total) by mouth 2 (two) times daily for 7 days.       Other Orders, Imaging & Consults:  UROLOGY - INTERNAL    No follow-ups on file.  Follow up: as above (see AVS)    All questions were answered.  We discussed the indications, proper use, risks, and benefits of the above recommendations including any medication(s) as prescribed.  The patient's parent (or guardian) indicate(s) understanding and agree(s) to the above plan of  care.    Loida Sanabria MD        [1]   Current Outpatient Medications   Medication Sig Dispense Refill    cephALEXin 500 MG Oral Cap Take 2 capsules (1,000 mg total) by mouth 2 (two) times daily for 7 days. 28 capsule 1    Linolenic Acid (OMEGA 3 OR) Take by mouth.      Multiple Vitamin (MULTI-VITAMIN DAILY) Oral Tab Take by mouth.     [2]   Past Medical History:   Gestational diabetes (HCC)    Human papilloma virus infection    Migraine headache    Varicose veins of both lower extremities   [3]   Past Surgical History:  Procedure Laterality Date    Colposcopy, cervix w/upper adjacent vagina; w/biopsy(s), cervix     [4] No Known Allergies

## 2025-06-11 NOTE — TELEPHONE ENCOUNTER
Patient stated that she will like for a medication to be sent to her pharmacy as she has a uti. Since Sunday she has been having pain on urination and lower back pain that is a 7/10.  No blood in the urine or fever. Patient was inform she will need a office visit for a evaluation. Patient agreed and she will see Dr. Sorto.    Future Appointments   Date Time Provider Department Center   6/11/2025 11:00 AM Loida Sanabria MD [facility]       Reason for Disposition   All other females with painful urination, or patient wants to be seen    Protocols used: Urination Pain - Female-A-[place]

## 2025-06-14 NOTE — PROGRESS NOTES
Mukul Monet,    Attached are the results of your recently performed labs/tests:    Your urine culture was positive for a bacteria, Proteus mirabilis.    Please continue your present antibiotic as prescribed at your office visit with me.    Recheck the urine culture in 2 weeks.  Follow-up pending the results    Follow-up with the urologist as referred for evaluation and treatment of your frequent urinary tract infections and to discuss whether preventative antibiotics are indicated.    Follow-up as needed and as recommended at your office visit with me.    Take care,     Loida Sanabria MD  6/14/2025    (Report(s) are attached, future lab/test orders or any referrals are in your chart/MyChart or will be mailed to you)

## 2025-07-02 ENCOUNTER — TELEPHONE (OUTPATIENT)
Facility: CLINIC | Age: 35
End: 2025-07-02

## 2025-07-02 ENCOUNTER — OFFICE VISIT (OUTPATIENT)
Facility: CLINIC | Age: 35
End: 2025-07-02

## 2025-07-02 VITALS
WEIGHT: 151.19 LBS | BODY MASS INDEX: 27.82 KG/M2 | HEIGHT: 62 IN | SYSTOLIC BLOOD PRESSURE: 112 MMHG | HEART RATE: 71 BPM | DIASTOLIC BLOOD PRESSURE: 75 MMHG

## 2025-07-02 DIAGNOSIS — Z87.19 HISTORY OF DIVERTICULITIS: Primary | ICD-10-CM

## 2025-07-02 DIAGNOSIS — K57.92 DIVERTICULITIS: Primary | ICD-10-CM

## 2025-07-02 PROCEDURE — 3008F BODY MASS INDEX DOCD: CPT | Performed by: NURSE PRACTITIONER

## 2025-07-02 PROCEDURE — 3078F DIAST BP <80 MM HG: CPT | Performed by: NURSE PRACTITIONER

## 2025-07-02 PROCEDURE — 3074F SYST BP LT 130 MM HG: CPT | Performed by: NURSE PRACTITIONER

## 2025-07-02 PROCEDURE — 99214 OFFICE O/P EST MOD 30 MIN: CPT | Performed by: NURSE PRACTITIONER

## 2025-07-02 NOTE — TELEPHONE ENCOUNTER
Patient was seen in office today (7/2/2025) by PAULA Cotton. Provided patient with office number and prep instructions. Reviewed prep instructions with patient in office, verbalized understanding. Patient aware GI schedulers will call patient to schedule the procedure.      Procedure orders:    Instructions    1. Schedule colonoscopy with General Pool Endoscopist  Diagnosis: new diverticulitis 4/2025  Sedation: MAC  Prep: split dose golytely     2.  bowel prep from pharmacy   You can pick the bowel prep up now and store in a cool, dry place in your home until your scheduled bowel prep start date.     3. Continue all medications as normal for your procedure. DO NOT TAKE: Any form of alcohol, recreational drugs and any forms of erectile dysfunction medications 24 hours prior to procedure.     4. Read all bowel prep instructions carefully. Bowel prep instructions can also be found online at:  www.[facility].org/giprep      5. AVOID seeds, nuts, popcorn, raw fruits and vegetables for 5 days before procedure     6. If you start any NEW medication after your visit today, please notify us. Certain medications (like iron or weight loss medications) will need to be held before the procedure, or the procedure cannot be performed safe

## 2025-07-02 NOTE — TELEPHONE ENCOUNTER
Scheduled for: Colonoscopy [address]132    Provider Name:  Dr Hutchinson    Date:  7/15/2025    Location:    [facility]    Sedation:  MAC    Prep:  Golytely    Meds/Allergies Reconciled?:  Physician reviewed     Diagnosis with codes:    History of diverticulitis [Z87.19]     Was patient informed to call insurance with codes (Y/N):  Yes, I confirmed BC[employer] HMO insurance with the patient.    Advised Patient: Please be sure to advise our office of any insurance changes as soon as possible to avoid possible cancellation of procedure      Referral sent?:  N/A    EM or [facility] notified?:  I sent an electronic request to Endo Scheduling and received a confirmation today.      Medication Orders:  This patient verbally confirmed that she is not taking:    Iron, blood thinners, BP meds, and is not diabetic    No latex allergy, No PCN allergy and does not have a pacemaker     Misc Orders:    Hold all Vitamins/Supplements 14 days prior to procedure     Further instructions given by staff:   I discussed the prep instructions with the patient which she verbally understood and is aware that I will send the instructions today via [employer].    Advised patient:    You will not be able to drive, operate machinery or make critical decisions the day of your procedure. Please make arrangements for transportation. You must have a  (age 18 or older) to accompany you, stay in the facility for the duration of your procedure and drive you home after the procedure.  You cannot use public transportation (Uber, Lyft, Taxi). The procedure involves sedation, and you will not be allowed to leave unaccompanied. Your procedure will not proceed forward if you're unable to confirm your  planned to escort you home.    Advised Patient:    [facility] requires payment of copay and any patient responsibility at the time of registration.   The EOS requires copay and 50% of the patient responsibility at the time of service for all Esophagogastroduodenoscopy and diagnostic  Colonoscopies.     They do offer payment plans and Care Credit options if unable to pay the full amount at the time of registration.     If you have any questions regarding your potential responsibility, please contact [facility] Insurance Department at [phone] option 1.    You may receive 4 bills related to your medical procedure:   [facility] (the facility)  The procedural physician  The anesthesiologist  The pathology lab (if applicable)

## 2025-07-02 NOTE — PATIENT INSTRUCTIONS
1. Schedule colonoscopy with General Pool Endoscopist  Diagnosis: new diverticulitis 4/2025  Sedation: MAC  Prep: split dose golytely    2.  bowel prep from pharmacy   You can pick the bowel prep up now and store in a cool, dry place in your home until your scheduled bowel prep start date.    3. Continue all medications as normal for your procedure. DO NOT TAKE: Any form of alcohol, recreational drugs and any forms of erectile dysfunction medications 24 hours prior to procedure.    4. Read all bowel prep instructions carefully. Bowel prep instructions can also be found online at:  [email or web address]     5. AVOID seeds, nuts, popcorn, raw fruits and vegetables for 5 days before procedure    6. If you start any NEW medication after your visit today, please notify us. Certain medications (like iron or weight loss medications) will need to be held before the procedure, or the procedure cannot be performed safely.

## 2025-07-02 NOTE — H&P
[facility] - Gastroenterology                                                                                                               Reason for consult: new diverticulitis     Requesting physician or provider: Brionna Oneill MD    Chief Complaint   Patient presents with    Consult     Refer from Pcp Nino,Follow up  ER/ diverticulitis 4/24/2025       HPI:   Tameka Monet is a 35 year old year-old female with medical history of prediabetes, migraines, diverticulitis.   Tristanian translation utilized during visit.    She is here today for follow up after sigmoid diverticulitis diagnosed in UC.  Given 10 days of Augmentin.    Had LLQ pain, has resolved now.  Baseline is 3 bowel movements per day. Has maybe been a little more constipated since the treatment.       Patient denies symptoms of nausea, vomiting, dyspepsia, dysphagia, odynophagia, globus sensation, heartburn, hematemesis, abdominal pain, change in bowel habits, constipation, diarrhea, hematochezia, or melena. she denies recent change in appetite, fever or unintentional weight loss.       Last colonoscopy: none  Last EGD: none    NSAIDS: none  Tobacco: none  Alcohol: none  Marijuana: none  Illicit drugs: none    No family history of GI malignancy or IBD     No history of adverse reaction to sedation  No AZRA  No anticoagulants/antiplatelet  No pacemaker/defibrillator    Wt Readings from Last 6 Encounters:   07/02/25 151 lb 3.2 oz (68.6 kg)   06/11/25 152 lb (68.9 kg)   04/17/25 155 lb (70.3 kg)   11/22/24 157 lb (71.2 kg)   11/14/24 157 lb (71.2 kg)   11/07/24 157 lb 6.4 oz (71.4 kg)        History, Medications, Allergies, ROS:      Past Medical History[1]   Past Surgical History[2]   Family Hx: Family History[3]   Social History: Short Social Hx on File[4]     Medications (Active prior to today's visit):  Current  Medications[5]    Allergies:  Allergies[6]    ROS:   CONSTITUTIONAL: negative for fevers, chills, sweats  EYES Negative for scleral icterus or redness, and diplopia  HEENT: Negative for hoarseness  RESPIRATORY: Negative for cough and severe shortness of breath  CARDIOVASCULAR: Negative for crushing sub-sternal chest pain  GASTROINTESTINAL: See HPI  GENITOURINARY: Negative for dysuria  MUSCULOSKELETAL: Negative for arthralgias and myalgias  SKIN: Negative for jaundice, rash or pruritus  NEUROLOGICAL: Negative for dizziness and headaches  BEHAVIOR/PSYCH: Negative for psychotic behavior    PHYSICAL EXAM:   Blood pressure 112/75, pulse 71, height 5' 2\" (1.575 m), weight 151 lb 3.2 oz (68.6 kg), last menstrual period 06/05/2025, not currently breastfeeding.    GEN: Alert, no acute distress, well-nourished   HEENT: anicteric sclera, neck supple, trachea midline, MMM, no palpable or tender neck or supraclavicular lymph nodes  CV: RRR, the extremities are warm and well perfused   LUNGS: No increased work of breathing, CTAB  ABDOMEN: Soft, symmetrical, non-tender without distention or guarding. No scars or lesions. Umbilicus is midline without herniation. Normoactive bowel sounds are present, No masses, hepatomegaly or splenomegaly noted.  MSK: No erythema, no warmth, no swelling of joints  SKIN: No jaundice, no erythema, no rashes, no lesions  HEMATOLOGIC: No bleeding, no bruising  NEURO: Alert and interactive, THOMPSON  PSYCH: appropriate mood & affect    Labs/Imaging/Procedures:   4/25/25  Impression   CONCLUSION:     1. Acute uncomplicated diverticulitis involving the proximal sigmoid colon.  Recommend follow-up colonoscopy following the resolution of symptoms.  2. No bowel obstruction, acute appendicitis, pericolonic fluid collection, or pneumoperitoneum.  3. No hydronephrosis or urinary calculus.  4. Stable 1.8 cm left ovarian dermoid.  5. Well-positioned intrauterine device.  6. Disc bulge with superimposed right  paracentral disc protrusion at L4-L5, which results in mild canal stenosis and moderate right subarticular zone stenosis.  This finding can be further evaluated with a nonemergent MRI of the lumbar spine, as  clinically indicated.  7. Lesser incidental findings described above.     ASSESSMENT/PLAN:   Tameka Monet is a 35 year old year-old female with medical history of prediabetes, migraines, diverticulitis.   Lithuanian translation utilized during visit.    1. Diverticulitis     Symptoms completely resolved. Given print out info on diverticulitis. Recommended increasing fiber in diet.  Follow up colonoscopy ordered.         Patient Instructions   1. Schedule colonoscopy with General Pool Endoscopist  Diagnosis: new diverticulitis 4/2025  Sedation: MAC  Prep: split dose golytely    2.  bowel prep from pharmacy   You can pick the bowel prep up now and store in a cool, dry place in your home until your scheduled bowel prep start date.    3. Continue all medications as normal for your procedure. DO NOT TAKE: Any form of alcohol, recreational drugs and any forms of erectile dysfunction medications 24 hours prior to procedure.    4. Read all bowel prep instructions carefully. Bowel prep instructions can also be found online at:  [email or web address]     5. AVOID seeds, nuts, popcorn, raw fruits and vegetables for 5 days before procedure    6. If you start any NEW medication after your visit today, please notify us. Certain medications (like iron or weight loss medications) will need to be held before the procedure, or the procedure cannot be performed safely.             Orders This Visit:  No orders of the defined types were placed in this encounter.      Meds This Visit:  Requested Prescriptions     Signed Prescriptions Disp Refills    polyethylene glycol, PEG 3350-KCl-NaBcb-NaCl-NaSulf, 236 g Oral Recon Soln 1 each 0     Sig: Take 4,000 mL by mouth As Directed. Take 2,000 mL the night before your  procedure and 2,000 mL the morning of your procedure.       Imaging & Referrals:  None      PAULA Paulino    [facility]  7/2/2025               [1]   Past Medical History:   Gestational diabetes (HCC)    Human papilloma virus infection    Migraine headache    Varicose veins of both lower extremities   [2]   Past Surgical History:  Procedure Laterality Date    Colposcopy, cervix w/upper adjacent vagina; w/biopsy(s), cervix     [3]   Family History  Problem Relation Age of Onset    Diabetes Maternal Uncle     Glaucoma Neg    [4]   Social History  Socioeconomic History    Marital status:    Tobacco Use    Smoking status: Never     Passive exposure: Never    Smokeless tobacco: Never   Vaping Use    Vaping status: Never Used   Substance and Sexual Activity    Alcohol use: Not Currently     Comment: None.     Drug use: Never    Sexual activity: Yes     Partners: Male     Social Drivers of Health     Food Insecurity: No Food Insecurity (9/18/2024)    Received from [facility]    Food Insecurity     Within the past 30 days, I worried whether my food would run out before I got money to buy more. / En los últimos 30 días, me preocupó que la comida se podía acabar antes de tener dinero para compr...: Never true / Nunca     Within the past 30 days, the food that I bought just didn't last, and I didn't have money to get more. / En los últimos 30 días, La comida que compré no rindió lo suficiente, y no tenía dinero para...: Never true / Nunca   [5]   Current Outpatient Medications   Medication Sig Dispense Refill    polyethylene glycol, PEG 3350-KCl-NaBcb-NaCl-NaSulf, 236 g Oral Recon Soln Take 4,000 mL by mouth As Directed. Take 2,000 mL the night before your procedure and 2,000 mL the morning of your procedure. 1 each 0    Linolenic Acid (OMEGA 3 OR) Take by mouth.      Multiple Vitamin (MULTI-VITAMIN DAILY) Oral Tab Take by mouth.     [6] No Known Allergies

## 2025-07-07 ENCOUNTER — OFFICE VISIT (OUTPATIENT)
Dept: SURGERY | Facility: CLINIC | Age: 35
End: 2025-07-07

## 2025-07-07 VITALS
HEIGHT: 62 IN | HEART RATE: 68 BPM | DIASTOLIC BLOOD PRESSURE: 74 MMHG | BODY MASS INDEX: 24.48 KG/M2 | WEIGHT: 133 LBS | SYSTOLIC BLOOD PRESSURE: 110 MMHG

## 2025-07-07 DIAGNOSIS — N39.0 RECURRENT UTI: Primary | ICD-10-CM

## 2025-07-07 LAB
BILIRUB UR QL: NEGATIVE
CLARITY UR: CLEAR
COLOR UR: COLORLESS
GLUCOSE UR-MCNC: NORMAL MG/DL
KETONES UR-MCNC: NEGATIVE MG/DL
LEUKOCYTE ESTERASE UR QL STRIP.AUTO: NEGATIVE
NITRITE UR QL STRIP.AUTO: NEGATIVE
PH UR: 7 [PH] (ref 5–8)
PROT UR-MCNC: NEGATIVE MG/DL
SP GR UR STRIP: 1.01 (ref 1–1.03)
UROBILINOGEN UR STRIP-ACNC: NORMAL

## 2025-07-07 PROCEDURE — 3078F DIAST BP <80 MM HG: CPT | Performed by: UROLOGY

## 2025-07-07 PROCEDURE — 3074F SYST BP LT 130 MM HG: CPT | Performed by: UROLOGY

## 2025-07-07 PROCEDURE — 99203 OFFICE O/P NEW LOW 30 MIN: CPT | Performed by: UROLOGY

## 2025-07-07 PROCEDURE — 3008F BODY MASS INDEX DOCD: CPT | Performed by: UROLOGY

## 2025-07-07 NOTE — PROGRESS NOTES
Hailey Kingston MD  Department of Urology  [address] [place] [address] [address]  [place] [address]    T: [phone]  F: [phone]    To: Brionna Oneill MD   [address] [place] [address] [address]  [facility] [id number]    Re: Tameka Monet   MRN: [id number]  : 1990    Dear Brionna Oneill MD,    Today I had the pleasure of seeing Tameka Monet in my clinic. As you know, Mrs. Kwame Monet is a pleasant 35 year old year old female who I am seeing for lower urinary tract symptoms. Patient was last seen in this department on Visit date not found.    Briefly, patient states that she has a burning sensation after intercourse       PAST MEDICAL HISTORY:  Past Medical History[1]     PAST SURGICAL HISTORY:  Past Surgical History[2]      ALLERGIES:  Allergies[3]      MEDICATIONS:  Current Outpatient Medications   Medication Instructions    Linolenic Acid (OMEGA 3 OR) Take by mouth.    Multiple Vitamin (MULTI-VITAMIN DAILY) Oral Tab Take by mouth.    polyethylene glycol, PEG 3350-KCl-NaBcb-NaCl-NaSulf, 236 g Oral Recon Soln 4,000 mL, Oral, As Directed, Take 2,000 mL the night before your procedure and 2,000 mL the morning of your procedure.        FAMILY HISTORY:  Family History[4]     SOCIAL HISTORY:  Short Social Hx on File[5]       PHYSICAL EXAMINATION:  Vitals:    25 1437   BP: 110/74   BP Location: Right arm   Patient Position: Sitting   Cuff Size: adult   Pulse: 68   Weight: 133 lb (60.3 kg)   Height: 5' 2\" (1.575 m)     CONSTITUTIONAL: No apparent distress, cooperative and communicative  NEUROLOGIC: Alert and oriented   HEAD: Normocephalic, atraumatic   EYES: Sclera non-icteric   ENT: Hearing intact, moist mucous membranes   NECK: No obvious goiter or masses   RESPIRATORY: Normal respiratory effort, Nonlabored breathing on room air  SKIN: No evident rashes   ABDOMEN: no obvious masses, no obvious distension      REVIEW OF SYSTEMS:    A comprehensive 10-point review of systems was completed.  Pertinent  positives and negatives are noted in the the HPI.       LABORATORY DATA:  URINE CULTURE 10,000 - 50,000 CFU/ML Proteus mirabilis Abnormal         Resulting Agency: [facility] ([facility])     Susceptibility     Proteus mirabilis     Not Specified    Ampicillin >=32 Resistant    Ampicillin + Sulbactam <=2 Sensitive    Cefazolin <=4 Sensitive    Ciprofloxacin <=0.25 Sensitive    Gentamicin <=1 Sensitive    Levofloxacin <=0.12 Sensitive    Meropenem <=0.25 Sensitive    Nitrofurantoin 128 Resistant    Piperacillin + Tazobactam <=4 Sensitive    Trimethoprim/Sulfa <=20 Sensitive              Urine Culture, Routine  Order: [id number]   Collected 4/24/2025 10:10 AM       Status: Final result       Dx: Left lower quadrant abdominal tendern...    Specimen Information: Urine, clean catch   0 Result Notes  URINE CULTURE <10,000 cfu/ml Multiple species present- probable contamination.        Resulting Agency: [facility] ([facility])          RINE CULTURE 50,000-99,000 CFU/ML Lactobacillus species Abnormal         Resulting Agency: [facility] ([facility])     URINE CULTURE >100,000 CFU/ML Escherichia coli Abnormal         Resulting Agency: [facility] ([facility])     Susceptibility     Escherichia coli     Not Specified    Ampicillin <=2 Sensitive    Cefazolin <=4 Sensitive    Ciprofloxacin <=0.25 Sensitive    Gentamicin <=1 Sensitive    Levofloxacin <=0.12 Sensitive    Meropenem <=0.25 Sensitive    Nitrofurantoin <=16 Sensitive    Piperacillin + Tazobactam <=4 Sensitive    Trimethoprim/Sulfa <=20 Sensitive                          IMAGING REVIEW:  Narrative   PROCEDURE: CT APPENDIX ABD PEL W CONTRAST (CPT=74177)     COMPARISON: [facility] PELVIS EV W DOPPLER (CPT=76856/51180/68475), 4/24/2025, 9:49 AM.  [facility],  PELVIS W EV (CPT=76856/98592), 9/28/2021, 10:05 AM.  [facility], CT ABDOMEN +  PELVIS KIDNEYSTONE 2D RNDR (NO IV NO ORAL) (CPT=741, 8/23/2021, 9:54 PM.      INDICATIONS: pain near belly button     TECHNIQUE: CT images of the abdomen and pelvis were obtained with non-ionic intravenous contrast material.  Automated exposure control for dose reduction was used. Adjustment of the mA and/or kV was done based on the patient's size. Use of iterative  reconstruction technique for dose reduction was used.  Dose information is transmitted to the ACR (American College of Radiology) NRDR (National Radiology Data Registry) which includes the Dose Index Registry.     FINDINGS:  LOWER CHEST: Minimal right lower lobe atelectasis/scarring.     HEPATOBILIARY:   The liver is upper limits of normal in size.  The liver is decreased in attenuation/enhancement when compared to the spleen, which may reflect hepatic steatosis or be secondary to phase of contrast.  No suspicious hepatic lesion.  No  acute cholecystitis.  No biliary ductal dilatation.     SPLEEN:   No enlargement or focal lesion.       PANCREAS:   No lesion, fluid collection, ductal dilatation, or atrophy.       ADRENALS:   No mass or enlargement.       GENITOURINARY:   No hydronephrosis or urinary calculus.  No enhancing renal mass.  The bladder is unremarkable.     GI TRACT:   Acute uncomplicated diverticulitis involving the proximal sigmoid colon (2:105, 6:48, 5:30).  There is a short segment of circumferential sigmoid colonic wall thickening in this region (5:26).  There is adjacent fat stranding and fascial  plane predominance.  No bowel obstruction.  No acute appendicitis.  No pericolonic fluid collection.     PELVIC ORGANS: Stable 1.8 cm left ovarian dermoid (2:110).  There is a well-positioned intrauterine device (2:120).  The uterus is anteverted.     AORTA/VASCULAR:   No aneurysm or dissection.     RETROPERITONEUM:   No mass or enlarged adenopathy.       PERITONEUM: No pneumoperitoneum or ascites.     LYMPH NODES: No evidence of lymphadenopathy.       BONES: No acute fracture. No aggressive osseous lesion.  There is a  disc bulge with superimposed right paracentral disc protrusion at L4-L5 (8:96, 6:67).  This finding results in mild canal stenosis and moderate right subarticular zone stenosis.    Scattered degenerative changes of the lower thoracic spine.  Mild degenerative change at the symphysis pubis.  Minimal bilateral sacroiliac degenerative change.     OTHER:   Negative.                 Impression   CONCLUSION:     1. Acute uncomplicated diverticulitis involving the proximal sigmoid colon.  Recommend follow-up colonoscopy following the resolution of symptoms.  2. No bowel obstruction, acute appendicitis, pericolonic fluid collection, or pneumoperitoneum.  3. No hydronephrosis or urinary calculus.  4. Stable 1.8 cm left ovarian dermoid.  5. Well-positioned intrauterine device.  6. Disc bulge with superimposed right paracentral disc protrusion at L4-L5, which results in mild canal stenosis and moderate right subarticular zone stenosis.  This finding can be further evaluated with a nonemergent MRI of the lumbar spine, as  clinically indicated.  7. Lesser incidental findings described above.        OTHER RELEVANT DATA:   none     IMPRESSION: Recurrent UTIs-recommend behavioral management as listed below.  If recurrent despite behavioral management may need to consider cystoscopy.  She did have a CT scan in April 2025 that demonstrated no upper urinary tract abnormalities    We talked about UTI prevention with continuing good hydration, starting a women's probiotic (bottle should say women's, vaginal, genitourinary; main ingredient should be lactobacillus), Cranberry pills (Ellura, Utiva, Crancap -all are found on Amazon on their respective website; they should have 36 mg PAC), bowel regimen (colace, senna, miralax), voiding before and after sexual activity and using pH balanced soaps. Can continue to check urine and treat when UCx is positive. If this persists,  can consider initiating low dose antibiotic prophylaxis for 6  months versus gentamicin irrigations if she would like a more local therapy.      Continuous antimicrobial prophylaxis regimens for women with recurrent UTIs have been recommended by several trials.  The dosing options for continuous prophylaxis includes the following:    TMP 100mg once daily  TMO-SMX 40mg/200mg once daily  TMP-SMX 40mg 200mg thrice weekly  Nitrofurantoin monohydrate/macrocrystals 50mg daily  Nitrofurantoin onohydrate/macrocrystals 100mg daily  Cephalexin 125mg once daily  Cephalexin 250mg once daily  Fosfomycin 3g every 10 days     These regimens can continue for 3 to 6 months.     Recommended instructions for antibiotic prophylaxis related to sexual intercourse include taking a single dose of antibiotic immediately before or after sexual intercourse.  Dosing options for prophylaxis includes the following:     TMP-SMX 40mg/200mg  TMP-SMX 80mg/400mg  Nitrofurantoin monohydrate/macrocrystals 50mg - 100mg  Cephalexin 250mg           PLAN:  Behavioral management  Return to clinic as needed  May need to consider cystoscopy    Thank you for referring this very pleasant patient to my clinic. If you have any questions or concerns, please do not hesitate to contact me.    Sincerely,  Hailey Kingston MD    30 minutes were spent on this patient at this visit obtaining a history, reviewing medical records, developing a treatment plan, counseling and discussing treatment strategy with patient, coordination of care and documentation.     The 21st Century Cures Act makes medical notes available to patients in the interest of transparency.  However, please be advised that this is a medical document.  It is intended as a peer to peer communication.  It is written in medical language and may contain abbreviations or verbiage that are technical and unfamiliar.  It may appear blunt or direct.  Medical documents are intended to carry relevant information, facts as evident, and the clinical opinion of the  practitioner.         [1]   Past Medical History:   Gestational diabetes (HCC)    Human papilloma virus infection    Migraine headache    Varicose veins of both lower extremities   [2]   Past Surgical History:  Procedure Laterality Date    Colposcopy, cervix w/upper adjacent vagina; w/biopsy(s), cervix     [3] No Known Allergies  [4]   Family History  Problem Relation Age of Onset    Diabetes Maternal Uncle     Glaucoma Neg    [5]   Social History  Socioeconomic History    Marital status:    Tobacco Use    Smoking status: Never     Passive exposure: Never    Smokeless tobacco: Never   Vaping Use    Vaping status: Never Used   Substance and Sexual Activity    Alcohol use: Not Currently     Comment: None.     Drug use: Never    Sexual activity: Yes     Partners: Male     Social Drivers of Health     Food Insecurity: No Food Insecurity (9/18/2024)    Received from [facility]    Food Insecurity     Within the past 30 days, I worried whether my food would run out before I got money to buy more. / En los últimos 30 días, me preocupó que la comida se podía acabar antes de tener dinero para compr...: Never true / Nunca     Within the past 30 days, the food that I bought just didn't last, and I didn't have money to get more. / En los últimos 30 días, La comida que compré no rindió lo suficiente, y no tenía dinero para...: Never true / Nunca

## 2025-07-08 ENCOUNTER — MED REC SCAN ONLY (OUTPATIENT)
Dept: FAMILY MEDICINE CLINIC | Facility: CLINIC | Age: 35
End: 2025-07-08

## 2025-07-15 ENCOUNTER — HOSPITAL ENCOUNTER (OUTPATIENT)
Facility: HOSPITAL | Age: 35
Setting detail: HOSPITAL OUTPATIENT SURGERY
Discharge: HOME | End: 2025-07-15
Attending: INTERNAL MEDICINE | Admitting: INTERNAL MEDICINE
Payer: COMMERCIAL

## 2025-07-15 ENCOUNTER — ANESTHESIA EVENT (OUTPATIENT)
Dept: ENDOSCOPY | Facility: HOSPITAL | Age: 35
End: 2025-07-15
Payer: COMMERCIAL

## 2025-07-15 ENCOUNTER — ANESTHESIA (OUTPATIENT)
Dept: ENDOSCOPY | Facility: HOSPITAL | Age: 35
End: 2025-07-15
Payer: COMMERCIAL

## 2025-07-15 VITALS
RESPIRATION RATE: 19 BRPM | HEART RATE: 62 BPM | SYSTOLIC BLOOD PRESSURE: 109 MMHG | DIASTOLIC BLOOD PRESSURE: 69 MMHG | BODY MASS INDEX: 24.48 KG/M2 | WEIGHT: 133 LBS | HEIGHT: 62 IN | OXYGEN SATURATION: 99 %

## 2025-07-15 DIAGNOSIS — Z87.19 HISTORY OF DIVERTICULITIS: ICD-10-CM

## 2025-07-15 LAB — B-HCG UR QL: NEGATIVE

## 2025-07-15 PROCEDURE — 45378 DIAGNOSTIC COLONOSCOPY: CPT | Performed by: INTERNAL MEDICINE

## 2025-07-15 RX ORDER — SODIUM CHLORIDE, SODIUM LACTATE, POTASSIUM CHLORIDE, CALCIUM CHLORIDE 600; 310; 30; 20 MG/100ML; MG/100ML; MG/100ML; MG/100ML
INJECTION, SOLUTION INTRAVENOUS CONTINUOUS
Status: DISCONTINUED | OUTPATIENT
Start: 2025-07-15 | End: 2025-07-15

## 2025-07-15 RX ORDER — LIDOCAINE HYDROCHLORIDE 10 MG/ML
INJECTION, SOLUTION EPIDURAL; INFILTRATION; INTRACAUDAL; PERINEURAL AS NEEDED
Status: DISCONTINUED | OUTPATIENT
Start: 2025-07-15 | End: 2025-07-15 | Stop reason: SURG

## 2025-07-15 RX ADMIN — LIDOCAINE HYDROCHLORIDE 50 MG: 10 INJECTION, SOLUTION EPIDURAL; INFILTRATION; INTRACAUDAL; PERINEURAL at 14:10:00

## 2025-07-15 NOTE — DISCHARGE INSTRUCTIONS
Home Care Instructions for Colonoscopy with Sedation    Diet:  - Resume your regular diet as tolerated unless otherwise instructed.  - Start with light meals to minimize bloating.  - Do not drink alcohol today.    Medication:  - If you have questions about resuming your normal medications, please contact your Primary Care Physician.    Activities:  - Take it easy today. Do not return to work today.  - Do not drive today.  - Do not operate any machinery today (including kitchen equipment).    Colonoscopy:  - You may notice some rectal \"spotting\" (a little blood on the toilet tissue) for a day or two after the exam. This is normal.  - If you experience any rectal bleeding (not spotting), persistent tenderness or sharp severe abdominal pains, oral temperature over 100 degrees Fahrenheit, light-headedness or dizziness, or any other problems, contact your doctor.      **If unable to reach your doctor, please go to the [facility] Emergency Room**    - Your referring physician will receive a full report of your examination.  - If you do not hear from your doctor's office within two weeks of your biopsy, please call them for your results.    You may be able to see your laboratory results in [employer] between 4 and 7 business days.  In some cases, your physician may not have viewed the results before they are released to In[employer].  If you have questions regarding your results contact the physician who ordered the test/exam by phone or via In[employer] by choosing \"Ask a Medical Question.\"

## 2025-07-15 NOTE — ANESTHESIA PREPROCEDURE EVALUATION
Anesthesia PreOp Note    HPI:     Tameka Monet is a 35 year old female who presents for preoperative consultation requested by: Gurpreet Hutchinson MD    Date of Surgery: 7/15/2025    Procedure(s):  COLONOSCOPY  Indication: History of diverticulitis    Relevant Problems   No relevant active problems       NPO:                         History Review:  Patient Active Problem List    Diagnosis Date Noted    Acute non intractable tension-type headache 02/16/2023    Seasonal allergic rhinitis due to pollen 10/04/2022    Iron deficiency anemia 07/19/2022    Prediabetes 10/13/2021    History of colonic diverticulitis 10/05/2021    History of 2019 novel coronavirus disease (COVID-19) 01/29/2021    Migraine without aura and without status migrainosus, not intractable 07/07/2016       Past Medical History[1]    Past Surgical History[2]    Prescriptions Prior to Admission[3]  Current Medications and Prescriptions Ordered in Epic[4]    Allergies[5]    Family History[6]  Social Hx on file[7]    Available pre-op labs reviewed.  Lab Results   Component Value Date    WBC 9.1 04/17/2025    RBC 4.53 04/17/2025    HGB 12.1 04/17/2025    HCT 37.5 04/17/2025    MCV 83.9 04/24/2025    MCV 82.8 04/17/2025    MCH 26.7 04/17/2025    MCHC 33.9 04/24/2025    MCHC 32.3 04/17/2025    RDW 14.3 04/17/2025    .0 04/17/2025    URINEPREG Negative 04/24/2025     Lab Results   Component Value Date     04/17/2025    K 3.5 04/17/2025     04/17/2025    CO2 26.0 04/17/2025    BUN 15 04/17/2025    CREATSERUM 0.65 04/17/2025    GLU 82 04/17/2025    CA 9.1 04/17/2025          Vital Signs:  Body mass index is 24.33 kg/m².   height is 1.575 m (5' 2\") and weight is 60.3 kg (133 lb).   Vitals:    07/09/25 1052   Weight: 60.3 kg (133 lb)   Height: 1.575 m (5' 2\")        Anesthesia Evaluation     Patient summary reviewed and Nursing notes reviewed    No history of anesthetic complications   Airway   Mallampati: II  TM distance: >3 FB  Neck  ROM: full  Dental - Dentition appears grossly intact     Pulmonary - negative ROS and normal exam    breath sounds clear to auscultation  Cardiovascular - negative ROS  Exercise tolerance: good    Rhythm: regular  Rate: normal    Neuro/Psych - negative ROS     GI/Hepatic/Renal    (+) liver disease    Endo/Other    (-) diabetes mellitus  Abdominal  - normal exam                 Anesthesia Plan:   ASA:  2  Plan:   MAC  Post-op Pain Management: IV analgesics  Informed Consent Plan and Risks Discussed With:  Patient  Use of Blood Products Discussed With:  Patient  Discussed plan with:  CRNA and surgeon      I have informed Tameka Monet and/or legal guardian or family member of the nature of the anesthetic plan, benefits, risks including possible dental damage if relevant, major complications, and any alternative forms of anesthetic management.   All of the patient's questions were answered to the best of my ability. The patient desires the anesthetic management as planned.  Birdie Lemons CRNA  7/15/2025 1:30 PM  Present on Admission:  **None**           [1]   Past Medical History:   Diverticulitis    Fatty liver    Gestational diabetes (HCC)    Human papilloma virus infection    Migraine headache    Migraines    Varicose veins of both lower extremities    Visual impairment    CONTACTS   [2]   Past Surgical History:  Procedure Laterality Date    Colposcopy, cervix w/upper adjacent vagina; w/biopsy(s), cervix     [3]   Medications Prior to Admission   Medication Sig Dispense Refill Last Dose/Taking    polyethylene glycol, PEG 3350-KCl-NaBcb-NaCl-NaSulf, 236 g Oral Recon Soln Take 4,000 mL by mouth As Directed. Take 2,000 mL the night before your procedure and 2,000 mL the morning of your procedure. (Patient not taking: Reported on 7/9/2025) 1 each 0 Not Taking    Linolenic Acid (OMEGA 3 OR) Take by mouth.   7/1/2025    Multiple Vitamin (MULTI-VITAMIN DAILY) Oral Tab Take by mouth. (Patient not taking: Reported on  7/9/2025)   Not Taking   [4]   Current Facility-Administered Medications Ordered in Epic   Medication Dose Route Frequency Provider Last Rate Last Admin    lactated ringers infusion   Intravenous Continuous Gurpreet Hutchinson MD         No current [facility]-ordered outpatient medications on file.   [5] No Known Allergies  [6]   Family History  Problem Relation Age of Onset    Diabetes Maternal Uncle     Glaucoma Neg    [7]   Social History  Socioeconomic History    Marital status:    Tobacco Use    Smoking status: Never     Passive exposure: Never    Smokeless tobacco: Never   Vaping Use    Vaping status: Never Used   Substance and Sexual Activity    Alcohol use: Not Currently     Comment: None.     Drug use: Never    Sexual activity: Yes     Partners: Male

## 2025-07-15 NOTE — ANESTHESIA POSTPROCEDURE EVALUATION
Patient: Tameka Monet    Procedure Summary       Date: 07/15/25 Room / Location: [facility] ENDOSCOPY 02 / EM ENDOSCOPY    Anesthesia Start: 1408 Anesthesia Stop: 1437    Procedure: COLONOSCOPY Diagnosis:       History of diverticulitis      (diverticulosis, hemorrhoids)    Surgeons: Gurpreet Hutchinson MD Anesthesiologist: Birdie Lemons CRNA    Anesthesia Type: MAC ASA Status: 2            Anesthesia Type: MAC    Vitals Value Taken Time    98 07/15/25 14:37   Temp 97 07/15/25 14:37   Pulse 71 07/15/25 14:37   Resp 16 07/15/25 14:37   SpO2 97 07/15/25 14:37       EM AN Post Evaluation:   Patient Evaluated in PACU  Patient Participation: complete - patient participated  Level of Consciousness: awake and alert  Pain Score: 0  Pain Management: adequate  Airway Patency:patent  Yes    Nausea/Vomiting: none  Cardiovascular Status: acceptable  Respiratory Status: acceptable  Postoperative Hydration acceptable      Birdie Lemons CRNA  7/15/2025 2:37 PM

## 2025-07-15 NOTE — H&P
History & Physical Examination    Patient Name: Tameka Monet  MRN: [id number]  [facility]: [id number]  YOB: 1990    Diagnosis: hx diverticulitis      Prescriptions Prior to Admission[1]  Current Hospital Medications[2]    Allergies: Allergies[3]    Past Medical History[4]  Past Surgical History[5]  Family History[6]  Social History     Tobacco Use    Smoking status: Never     Passive exposure: Never    Smokeless tobacco: Never   Substance Use Topics    Alcohol use: Not Currently     Comment: None.        SYSTEM Check if Review is Normal Check if Physical Exam is Normal If not normal, please explain:   HEENT [x ] [ x]    NECK & BACK [x ] [x ]    HEART [x ] [ x]    LUNGS [x ] [ x]    ABDOMEN [x ] [x ]    UROGENITAL [ ] [ ]    EXTREMITIES [x ] [x ]    OTHER        [ x ] I have discussed the risks and benefits and alternatives with the patient/family.  They understand and agree to proceed with plan of care.  [ x ] I have reviewed the History and Physical done within the last 30 days.  Any changes noted above.    Gurpreet Hutchinson MD  7/15/2025  1:50 PM         [1]   Medications Prior to Admission   Medication Sig Dispense Refill Last Dose/Taking    polyethylene glycol, PEG 3350-KCl-NaBcb-NaCl-NaSulf, 236 g Oral Recon Soln Take 4,000 mL by mouth As Directed. Take 2,000 mL the night before your procedure and 2,000 mL the morning of your procedure. (Patient not taking: Reported on 7/9/2025) 1 each 0 Not Taking    Linolenic Acid (OMEGA 3 OR) Take by mouth.   7/1/2025    Multiple Vitamin (MULTI-VITAMIN DAILY) Oral Tab Take by mouth. (Patient not taking: Reported on 7/9/2025)   Not Taking   [2]   Current Facility-Administered Medications   Medication Dose Route Frequency    lactated ringers infusion   Intravenous Continuous   [3] No Known Allergies  [4]   Past Medical History:   Diverticulitis    Fatty liver    Gestational diabetes (HCC)    Human papilloma virus infection    Migraine headache    Migraines     Varicose veins of both lower extremities    Visual impairment    CONTACTS   [5]   Past Surgical History:  Procedure Laterality Date    Colposcopy, cervix w/upper adjacent vagina; w/biopsy(s), cervix     [6]   Family History  Problem Relation Age of Onset    Diabetes Maternal Uncle     Glaucoma Neg

## 2025-07-15 NOTE — OPERATIVE REPORT
[facility] Report  Date of procedure-July 15, 2025    Preoperative Diagnosis:  - History of diverticulitis    Postoperative Diagnosis:  - Diverticular disease located in the left colon  - Internal hemorrhoids    Procedure:    Colonoscopy     Surgeon:  Gurpreet Hutchinson M.D.    Anesthesia:  MAC     Technique:  After informed consent, the patient was placed in the left lateral recumbent position.  Digital rectal examination revealed no palpable intraluminal abnormalities.  An Olympus variable stiffness 190 series HD colonoscope was inserted into the rectum and advanced under direct vision by following the lumen to the cecum.  The colon was examined upon withdrawal in the left lateral position. The procedure was well tolerated without immediate complication.    Findings:  The preparation of the colon was good.  The terminal ileum was examined for 4 cm and visually normal.  The ileocecal valve was well preserved. The visualized colonic mucosa from the cecum to the anal verge was normal with an intact vascular pattern.    Diverticular disease  located in the left colon, no diverticulitis.    Small internal hemorrhoids noted on retroflexed view.    Estimated blood loss-insignificant  Specimens-see above    Impression:  - Diverticular disease located in the left colon  - Internal hemorrhoids    Recommendations:  - Post procedure instructions given  - Repeat colonoscopy in 10 years at age 45   - High fiber diet for diverticular disease  - Symptomatic treatment of hemorrhoids          Gurpreet Hutchinson MD  7/15/2025  4:09 PM

## (undated) DEVICE — KIT CLEAN ENDOKIT 1.1OZ GOWNX2

## (undated) DEVICE — V2 SPECIMEN COLLECTION MANIFOLD KIT: Brand: NEPTUNE

## (undated) DEVICE — Device

## (undated) DEVICE — MEDI-VAC NON-CONDUCTIVE SUCTION TUBING 6MM X 1.8M (6FT.) L: Brand: CARDINAL HEALTH

## (undated) DEVICE — KIT ENDO ORCAPOD 160/180/190

## (undated) DEVICE — 60 ML SYRINGE REGULAR TIP: Brand: MONOJECT

## (undated) NOTE — LETTER
[address]      Authorization for Surgical Operation and Procedure     Date:10/28/2020                                                                                                  Time:__________  1. 4.   Should the need arise during my operation or immediate post-operative period, I also consent to the administration of blood and/or blood products.   Further, I understand that despite careful testing and screening of blood or blood products by lakhwinder 8.   I recognize that in the event my procedure results in extended X-Ray/fluoroscopy time, I may develop a skin reaction. 9.  If I have a Do Not Attempt Resuscitation (DNAR) order in place, that status will be suspended while in the operating room, proc STATEMENT OF PHYSICIAN My signature below affirms that prior to the time of the procedure; I have explained to the patient and/or his/her legal representative, the risks and benefits involved in the proposed treatment and any reasonable alternative to the

## (undated) NOTE — LETTER
11/22/2024          To Whom It May Concern:    Tameka Monet is currently under my medical care and may not return to work at this time.    Please excuse Tameka for today 11/22/2024 .  She may return to work on 11/23/2024.  Activity is restricted as follows: none.    If you require additional information please contact our office.        Sincerely,    Sid Iglesias MD

## (undated) NOTE — LETTER
VACCINE ADMINISTRATION RECORD  PARENT / GUARDIAN APPROVAL  Date: 2019  Vaccine administered to: Nonie Gaucher     : 1990    MRN: [id number]    A copy of the appropriate Centers for Disease Control and Prevention Vaccine Information statem

## (undated) NOTE — LETTER
AUTHORIZATION FOR SURGICAL OPERATION OR OTHER PROCEDURE    1. I hereby authorize Dr. Antonio Hung, and [place] [facility] staff assigned to my case to perform the following operation and/or procedure at the [place] [facility]:    IUD INSERTION    2. My physician has explained the nature and purpose of the operation or other procedure, possible alternative methods of treatment, the risks involved, and the possibility of complication to me. I acknowledge that no guarantee has been made as to the result that may be obtained. 3.  I recognize that, during the course of this operation, or other procedure, unforseen conditions may necessitate additional or different procedure than those listed above. I, therefore, further authorize and request that the above named physician, his/her physician assistants or designees perform such procedures as are, in his/her professional opinion, necessary and desirable. 4.  Any tissue or organs removed in the operation or other procedure may be disposed of by and at the discretion of the [facility] and [facility]. 5.  I understand that in the event of a medical emergency, I will be transported by local paramedics to [facility] or other hospital emergency department. 6.  I certify that I have read and fully understand the above consent to operation and/or other procedure. 7.  I acknowledge that my physician has explained sedation/analgesia administration to me including the risks and benefits. I consent to the administration of sedation/analgesia as may be necessary or desirable in the judgement of my physician. Witness signature: ___________________________________________________ Date:  ______/______/_____                    Time:  ________ A. M.  P.M.        Patient Name:  ______________________________________________________  (please print)      Patient signature:  ___________________________________________________             Relationship to Patient:           []  Parent    Responsible person                          []  Spouse  In case of minor or                    [] Other  _____________   Incompetent name:  __________________________________________________                               (please print)      _____________      Responsible person  In case of minor or  Incompetent signature:  _______________________________________________    Statement of Physician  My signature below affirms that prior to the time of the procedure, I have explained to the patient and/or his/her guardian, the risks and benefits involved in the proposed treatment and any reasonable alternative to the proposed treatment. I have also explained the risks and benefits involved in the refusal of the proposed treatment and have answered the patient's questions.                         Date:  ______/______/_______  Provider                      Signature:  __________________________________________________________       Time:  ___________ A.M    P.M.

## (undated) NOTE — LETTER
AUTHORIZATION FOR SURGICAL OPERATION OR OTHER PROCEDURE    1.  I hereby authorize Dr. Natalio Chambers, and [facility] staff assigned to my case to perform the following operation and/or procedure at the [facility]:    IUD REMOVAL _______________________ Patient Name:  ______________________________________________________  (please print)      Patient signature:  ___________________________________________________             Relationship to Patient:           []  Parent    Responsible person

## (undated) NOTE — LETTER
AUTHORIZATION FOR SURGICAL OPERATION OR OTHER PROCEDURE    1.  I hereby authorize Dr. Daphney Melendez, and [facility] staff assigned to my case to perform the following operation and/or procedure at the [facility]:    COLPOSCOPY    ________ Patient signature:  ___________________________________________________             Relationship to Patient:           []  Parent    Responsible person                          []  Spouse  In case of minor or                    [] Other  _____________   In

## (undated) NOTE — MR AVS SNAPSHOT
After Visit Summary   5/8/2019    Tawanda Barajas    MRN: [id number]           Visit Information     Date & Time  5/8/2019  8:00 AM Provider  [facility] Dept.  Phone  [phone] Instructions:   To schedule a test at any [facility], call [facility] Sched[facility] at [phone], Monday through Friday between 7:30am to 6pm and on Saturday between 8am and 1pm.  Evening and weekend appointments for your exam are can be used at a later time if you forget your password. 8. Enter your e-mail address. You will receive e-mail notification when new information is available in [address] 9. Click Sign In. You can now view your medical record.     Additional Information Average cost  $35*      [address]  Monday - Friday  10:00 am - 10:00 pm  Saturday - Sunday  10:00 am - 4:00 pm      [address]  Monday - Friday  4:00 pm - 10:00 pm  Saturday - Sunday  1

## (undated) NOTE — LETTER
AUTHORIZATION FOR SURGICAL OPERATION OR OTHER PROCEDURE    1. I hereby authorize Dr. Niharika Odronez, and [facility] staff assigned to my case to perform the following operation and/or procedure at the [facility]:         Colposcopy     2.   My physi Relationship to Patient:           []  Parent    Responsible person                          []  Spouse  In case of minor or                    [] Other  _____________   Incompetent name:  __________________________________________________

## (undated) NOTE — LETTER
Patient Name: Tameka Monet : 1990  Medical Record #: [id number] Printed: 2025  Page 1 of 2                                          [facility]  [address], [place], [place]  Autorización para operación y procedimiento quirúrgico                                                                                                      Por la presente, autorizo a Gurpreet Hutchinson MD, mi médico y al asistente a realizar la operación/procedimiento quirúrgico a continuación, así ren a administrar la anestesia que determine necesaria mi médico Nombre (s) de la operación/procedimiento: COLONOSCOPY en aTmeka Monet     Reconozco que katt la operación/procedimiento quirúrgico, las condiciones imprevistas pueden requerir de procedimientos adicionales o diferentes a aquellos mencionados anteriormente.  Por lo tanto, autorizo y solicito además que el cirujano antes mencionado, los asistentes o las personas designadas realicen los procedimientos que, a escobar juicio, diane necesarios y convenientes.    Mi cirujano/médico neff discutido antes de mi cirugía los posibles beneficios, riesgos y efectos secundarios de yaw procedimiento, la probabilidad de alcanzar las metas y los posibles problemas que puedan ocurrir katt la recuperación.  Ellos también alberto discutido las alternativas razonables al procedimiento, incluso los riesgos, beneficios y efectos secundarios relacionados con las alternativas y los riesgos relacionados con no realizar yaw procedimiento.  Alberto respondido a todas mis preguntas y confirmo que no se ha dado ninguna garantía en cuanto a los resultados que pueda obtener.    En edmond de que surja la necesidad katt mi operación o katt el periodo postoperatorio, también autorizo se aplique colleen y/o productos sanguíneos.  Asimismo, entiendo que a pesar de las cuidadosas pruebas y análisis de colleen o de los productos sanguíneos que realizan las entidades recolectoras,  todavía puedo estar sujeto a efectos adversos ren resultado de recibir amarilis transfusión de colleen y/o productos sanguíneos.  A continuación se mencionan algunos, aunque no todos, los riesgos potenciales que pueden ocurrir: fiebre y reacciones alérgicas, reacciones hemolíticas, trasmisión de enfermedades ren hepatitis, SIDA y citomegalovirus (CMV), así ren sobrecarga de líquidos.   En edmond de que desee tener amarilis transfusión autóloga de mi propia colleen o amarilis transfusión de un donante dirigido.  Lo discutiré con mi médico.  Check only if Refusing Blood or Blood Products  I understand refusal of blood or blood products as deemed necessary by my physician may have serious consequences to my condition to include possible death. I hereby assume responsibility for my refusal and release the hospital, its personnel, and my physicians from any responsibility for the consequences of my refusal.           o  Refuse       Autorizo el uso de cualquier muestra, órgano, tejido, parte del cuerpo u objeto extraño que pueda ser extraído de mi cuerpo katt la operación/procedimiento para fines de diagnóstico, investigación o de enseñanza y escobar desecho posterior por las autoridades del hospital.  También, autorizo la revelación de los resultados de las pruebas de muestras y/o los informes escritos al médico tratante ren personal médico del hospital u otros médicos de referencia o consulta involucrados en mi atención, a discreción del patólogo o de mi médico tratante.    Doy consentimiento para que se fotografíen o graben vídeos de las operaciones o procedimientos a realizarse, incluidas las partes de mi cuerpo que diane adecuadas para propósitos médicos, científicos o educativos, en el entendido de que, mi identidad no sea revelada por las fotografías o por textos descriptivos que las acompañen.  Si el procedimiento es fotografiado o grabado en vídeo, el cirujano obtendrá la imagen, cinta de vídeo o CD original.  El hospital no se  hará responsable por el almacenamiento, la revelación o el mantenimiento de la imagen, cinta o CD.    Autorizo la presencia de un especialista de producto u observadores en el quirófano, según lo considere necesario mi médico o las personas que éste designe.     Reconozco que en edmond de que mi procedimiento resulte en un tiempo prolongado de radiografía/fluoroscopia, puedo desarrollar amarilis reacción en la piel.    Si tengo amarilis orden de No intentar la reanimación (VAUGHN), dorita estado se suspenderá mientras esté en el quirófano, la danelle de procedimientos y katt el periodo de recuperación a menos que yo indique lo contrario explícitamente (o amarilis persona autorizada a elvin el consentimiento en mi nombre). El cirujano o mi médico tratante determinarán cuándo termina el periodo de recuperación aplicable a los efectos de restablecer la orden de VAUGHN.  Pacientes que se realizan un procedimiento de esterilización: Entiendo que si el procedimiento tiene éxito, los resultados serán permanentes y que, por lo tanto, me será imposible inseminar, concebir o tener hijos.  Asimismo, entiendo que el procedimiento tiene ren propósito la esterilidad, aunque el resultado no está garantizado.   Admito que mi médico me ha explicado la aplicación de sedación/analgesia, incluidos los riesgos y beneficios y, consiento a la administración de sedación/analgesia conforme sea necesario o conveniente a juicio de mi médico.      CERTIFICO QUE HE LEÍDO Y COMPRENDIDO EL CONSENTIMIENTO ANTERIOR PARA LA OPERACIÓN y/o PROCEDIMIENTO.             ______________________________________  _______________________________  Firma del paciente      Firma de la persona responsible  Signature of patient      Signature of responsible person                        ___________________________________                                   Nombre en imprenta de la persona responsible         Name of responsible person          ___________________________________                  Relación con el paciente         Relationship to patient    _________________________________________  ______________ ________________  Firma del testigo          Fecha / Date Hora / Time  Signature of witness    DECLARACÓN DEL MÉDICO Mediante mi firma al calce, afirmo que antes de la hora del procedimiento, he explicadoal paciente y/o a escobar representante legal, los riesgos y beneficios involucrados en el tratamiento propuesto, así comocualquier alternativa razonable al tratamiento propuesto. También le(s) he explicado los riesgos y beneficios involucradosen el rechazo del tratarniento propuesto y alternativas al tratamiento propuesto, y he respondido a las preguntas del(la) paciente(My signature below affirms that prior to the time of the procedure, I have explained to the patient and/or his/her guardian, therisks and benefits involved in the proposed treatment and any reasonable alternative to the proposed treatment. I have alsoexplained the risks and benefits involved in refusal of the proposed treatment and have answered the patient's questions.)    ________________________________________   _________________________   _____________   (Firma del médico/Signature of Physician)                                    (Fecha/Date)                                             (Hora/Time)      Patient Name: Tameka Monet     : 1990                 Printed: 2025      Medical Record #: [id number]                                              Page 2 of 2

## (undated) NOTE — MR AVS SNAPSHOT
[facility]  [address]  [phone]               Thank you for choosing us for your health care visit with Doris Quinonez MD.  We are glad to serve you and happy to provide you with this summa

## (undated) NOTE — LETTER
Brionna Oneill Md  [id number]  [place] [address]       11/22/24        Patient: Tameka Monet   YOB: 1990   Date of Visit: 11/22/2024       Dear  Dr. Nino MD,      Thank you for referring Tameka Monet to my practice.  Please find my assessment and plan below.    ASSESSMENT AND PLAN    1. Tinnitus, unspecified laterality  - Audiology Referral - [place] ([facility])    2. Otalgia of both ears  Normal hearing on audiometry.  Symptoms appear to be more musculoskeletal in nature.  Start Celebrex cyclobenzaprine warm heat soft diet chewing both sides of mouth and I did recommend that she meet with her dentist to see whether or not she would benefit from some type of a bite guard in the future.                 Sincerely,   Sid Iglesias MD   [facility] MEDICAL [facility]  [address] [id number]    Document electronically generated by:  Sid Iglesias MD

## (undated) NOTE — LETTER
[facility]  Administration of Anesthesia  1.  Guerline Bland, or _________________________________ acting on her behalf, (Patient) (Dependent/Representative) request to receive anesthesia for my pending procedure/operation/treatmen 6. OBSTETRIC PATIENTS: Specific risks/consequences of spinal/epidural anesthesia may include itching, low blood pressure, difficulty urinating, slowing of the baby's heart rate and headache.  Rare risks include infections, high spinal block, spinal bleeding ___________________________________________________           _____________________________________________________  Date/Time                                                                                               Responsible person in case of minor

## (undated) NOTE — LETTER
[facility]   Administracion de Anestesia  Yo, Tameka Puente Tierney, acepto ser atendido por un anestesiólogo, quien está especialmente capacitado para monitorearme y darme medicamento para hacerme dormir o mantenerme cómodo katt mi procedimiento.   Entiendo que mi anestesiólogo no es un empleado o agente de [facility]. Él o catalina trabaja para [facility]  Lazaro el paciente que solicita los servicios de anestesia, estoy de acuerdo con lo siguiente:  Permitir al anestesiólogo (médico de anestesia) que me suministre el medicamento y hacer los procedimientos adicionales que diane necesarios. Algunos ejemplos son: Al iniciar o utilizar amarilis “IV” para suministrarme medicamentos, fluidos o colleen katt mi procedimiento, y colocarme amarilis sonda de respiración, me ayudará a respirar cuando esté dormido (intubación). En el edmond de que mi corazón deje de funcionar correctamente, entiendo que mi anestesiólogo hará todo lo posible para salvar mi adarsh, a menos que los documentos de No resucitar de [facility] lo indiquen de otra manera.  Informar a mi anestesista antes del procedimiento:  Si estoy embarazada.  La última vez que comí o bebí algo.  Todos los medicamentos que noe (incluyendo medicamentos recetados, suplementos herbales y pastillas que puedo comprar sin receta médica (incluyendo drogas en la prasad [medicamentos ilegales]). No informar a mi anestesiólogo acerca de estos medicamentos puede aumentar mi riesgo de complicaciones con la anestesia.  Si soy alérgico a cualquier cosa o he tenido previamente amarilis reacción adversa a la anestesia.  Entiendo cómo la anestesia me ayudará (beneficios).  Entiendo que con cualquier tipo de anestesia hay riesgos:  Los riesgos más comunes son: náuseas, vómitos, dolor de garganta, dolor muscular, daño a los ojos, la boca o los dientes (por la colocación de la sonda de respiración).  Los riesgos poco comunes incluyen:  recordar lo que sucedió katt mi procedimiento, reacciones alérgicas a los medicamentos, lesiones en las vías respiratorias, el corazón, los pulmones, la visión, los nervios o músculos, y en casos sumamente raros, la muerte.  Mii médico me ha explicado otras opciones de atención disponibles para mí (alternativas).  Pacientes embarazadas (“epidural”):    Entiendo que los riesgos de recibir amarilis inyección epidural (medicamento que se aplica en la espalda para ayudar a controlar el dolor katt el parto), incluyen picazón, presión arterial baja, dificultad para orinar, dolor de osiris o disminución en el ritmo del corazón del bebé. Los riesgos muy poco comunes incluyen infección, hemorragia, convulsiones, ritmo cardíaco irregular y lesión del nervio.  Anestesia regional (“columna vertebral”, “epidural” y “bloqueo de los nervios”):  Entiendo que hay complicaciones poco frecuentes carmela posibles, e incluyen dolor de osiris, sangrado, infección, convulsiones, ritmo cardíaco irregular y la lesión del nervio.  .   Puedo cambiar de opinión acerca de recibir servicios de anestesia en cualquier momento antes de que se me administre el medicamento.         Paciente (o representante) Firma/Relación con el paciente  Fecha  Hora  Patient Signature/Signature of Responsible person Date Time           Nombre del intérprete (en escobar edmond)  Idioma/Organización  Hora  Name of  Language/Organization Time          Firma del anestesiólogo Fecha  Hora  Signature of anesthesiologist Date Time    He hablado del procedimiento y la información anterior con el paciente (o el representante del paciente) y respondí alyssa preguntas. El paciente o escobar representante ha aceptado recibir los servicios de anestesia.         Testigo Fecha  Hora  Witness Date Time  He verificado que la firma es la del paciente o del representante del paciente, y que se firmó antes del procedimiento.    Nombre del paciente: Tameka Monet Fec. de Nac.:  6/6/1990                 En letra de imprenta: July 11, 2025  Expediente médico No. [id number]                         Página 1 de 2  ----------ANESTHESIA CONSENT----------

## (undated) NOTE — LETTER
[address]      Authorization for Surgical Operation and Procedure     Date:10/28/2020                                                                                                    Time:__________  1 4.   Should the need arise during my operation or immediate post-operative period, I also consent to the administration of blood and/or blood products.   Further, I understand that despite careful testing and screening of blood or blood products by lakhwinder 8.   I recognize that in the event my procedure results in extended X-Ray/fluoroscopy time, I may develop a skin reaction. 9.  If I have a Do Not Attempt Resuscitation (DNAR) order in place, that status will be suspended while in the operating room, proc STATEMENT OF PHYSICIAN My signature below affirms that prior to the time of the procedure; I have explained to the patient and/or his/her legal representative, the risks and benefits involved in the proposed treatment and any reasonable alternative to the